# Patient Record
Sex: FEMALE | Race: WHITE
[De-identification: names, ages, dates, MRNs, and addresses within clinical notes are randomized per-mention and may not be internally consistent; named-entity substitution may affect disease eponyms.]

---

## 2023-08-14 ENCOUNTER — HOSPITAL ENCOUNTER (INPATIENT)
Dept: HOSPITAL 47 - EC | Age: 63
LOS: 8 days | Discharge: HOME | DRG: 682 | End: 2023-08-22
Attending: HOSPITALIST | Admitting: HOSPITALIST
Payer: MEDICARE

## 2023-08-14 VITALS — BODY MASS INDEX: 32.1 KG/M2

## 2023-08-14 DIAGNOSIS — Z91.119: ICD-10-CM

## 2023-08-14 DIAGNOSIS — Z88.1: ICD-10-CM

## 2023-08-14 DIAGNOSIS — E87.5: ICD-10-CM

## 2023-08-14 DIAGNOSIS — J84.9: ICD-10-CM

## 2023-08-14 DIAGNOSIS — Z28.21: ICD-10-CM

## 2023-08-14 DIAGNOSIS — Z88.0: ICD-10-CM

## 2023-08-14 DIAGNOSIS — G25.3: ICD-10-CM

## 2023-08-14 DIAGNOSIS — Z86.73: ICD-10-CM

## 2023-08-14 DIAGNOSIS — I34.0: ICD-10-CM

## 2023-08-14 DIAGNOSIS — I13.2: ICD-10-CM

## 2023-08-14 DIAGNOSIS — I50.9: ICD-10-CM

## 2023-08-14 DIAGNOSIS — Z99.2: ICD-10-CM

## 2023-08-14 DIAGNOSIS — I65.23: ICD-10-CM

## 2023-08-14 DIAGNOSIS — N17.9: Primary | ICD-10-CM

## 2023-08-14 DIAGNOSIS — I49.3: ICD-10-CM

## 2023-08-14 DIAGNOSIS — I95.9: ICD-10-CM

## 2023-08-14 DIAGNOSIS — J44.9: ICD-10-CM

## 2023-08-14 DIAGNOSIS — G89.29: ICD-10-CM

## 2023-08-14 DIAGNOSIS — Z79.899: ICD-10-CM

## 2023-08-14 DIAGNOSIS — K21.9: ICD-10-CM

## 2023-08-14 DIAGNOSIS — N18.6: ICD-10-CM

## 2023-08-14 DIAGNOSIS — E03.9: ICD-10-CM

## 2023-08-14 DIAGNOSIS — G92.8: ICD-10-CM

## 2023-08-14 DIAGNOSIS — R45.1: ICD-10-CM

## 2023-08-14 DIAGNOSIS — Z91.81: ICD-10-CM

## 2023-08-14 DIAGNOSIS — Z88.2: ICD-10-CM

## 2023-08-14 DIAGNOSIS — H02.403: ICD-10-CM

## 2023-08-14 DIAGNOSIS — E87.1: ICD-10-CM

## 2023-08-14 DIAGNOSIS — E83.9: ICD-10-CM

## 2023-08-14 DIAGNOSIS — Z79.82: ICD-10-CM

## 2023-08-14 DIAGNOSIS — D63.1: ICD-10-CM

## 2023-08-14 DIAGNOSIS — Z79.51: ICD-10-CM

## 2023-08-14 DIAGNOSIS — K29.70: ICD-10-CM

## 2023-08-14 LAB
ALBUMIN SERPL-MCNC: 4.1 G/DL (ref 3.5–5)
ALP SERPL-CCNC: 104 U/L (ref 38–126)
ALT SERPL-CCNC: 39 U/L (ref 4–34)
ANION GAP SERPL CALC-SCNC: 11 MMOL/L
AST SERPL-CCNC: 71 U/L (ref 14–36)
BASOPHILS # BLD AUTO: 0.1 K/UL (ref 0–0.2)
BASOPHILS NFR BLD AUTO: 1 %
BUN SERPL-SCNC: 54 MG/DL (ref 7–17)
CALCIUM SPEC-MCNC: 9.1 MG/DL (ref 8.4–10.2)
CHLORIDE SERPL-SCNC: 94 MMOL/L (ref 98–107)
CO2 BLDA-SCNC: 29 MMOL/L (ref 19–24)
CO2 SERPL-SCNC: 29 MMOL/L (ref 22–30)
EOSINOPHIL # BLD AUTO: 0 K/UL (ref 0–0.7)
EOSINOPHIL NFR BLD AUTO: 0 %
ERYTHROCYTE [DISTWIDTH] IN BLOOD BY AUTOMATED COUNT: 3.26 M/UL (ref 3.8–5.4)
ERYTHROCYTE [DISTWIDTH] IN BLOOD: 15.5 % (ref 11.5–15.5)
GLUCOSE SERPL-MCNC: 118 MG/DL (ref 74–99)
HCO3 BLDA-SCNC: 28 MMOL/L (ref 21–25)
HCT VFR BLD AUTO: 31.7 % (ref 34–46)
HGB BLD-MCNC: 10.4 GM/DL (ref 11.4–16)
LYMPHOCYTES # SPEC AUTO: 2.3 K/UL (ref 1–4.8)
LYMPHOCYTES NFR SPEC AUTO: 19 %
MAGNESIUM SPEC-SCNC: 4 MG/DL (ref 1.6–2.3)
MCH RBC QN AUTO: 31.9 PG (ref 25–35)
MCHC RBC AUTO-ENTMCNC: 32.7 G/DL (ref 31–37)
MCV RBC AUTO: 97.4 FL (ref 80–100)
MONOCYTES # BLD AUTO: 0.7 K/UL (ref 0–1)
MONOCYTES NFR BLD AUTO: 6 %
NEUTROPHILS # BLD AUTO: 8.5 K/UL (ref 1.3–7.7)
NEUTROPHILS NFR BLD AUTO: 73 %
PCO2 BLDA: 44 MMHG (ref 35–45)
PH BLDA: 7.4 [PH] (ref 7.35–7.45)
PLATELET # BLD AUTO: 173 K/UL (ref 150–450)
PO2 BLDA: 129 MMHG (ref 83–108)
POTASSIUM SERPL-SCNC: 7.4 MMOL/L (ref 3.5–5.1)
PROT SERPL-MCNC: 6.8 G/DL (ref 6.3–8.2)
SODIUM SERPL-SCNC: 134 MMOL/L (ref 137–145)
WBC # BLD AUTO: 11.7 K/UL (ref 3.8–10.6)

## 2023-08-14 PROCEDURE — 86706 HEP B SURFACE ANTIBODY: CPT

## 2023-08-14 PROCEDURE — 83550 IRON BINDING TEST: CPT

## 2023-08-14 PROCEDURE — 87324 CLOSTRIDIUM AG IA: CPT

## 2023-08-14 PROCEDURE — 84100 ASSAY OF PHOSPHORUS: CPT

## 2023-08-14 PROCEDURE — 82728 ASSAY OF FERRITIN: CPT

## 2023-08-14 PROCEDURE — 83540 ASSAY OF IRON: CPT

## 2023-08-14 PROCEDURE — 45378 DIAGNOSTIC COLONOSCOPY: CPT

## 2023-08-14 PROCEDURE — 94760 N-INVAS EAR/PLS OXIMETRY 1: CPT

## 2023-08-14 PROCEDURE — 96376 TX/PRO/DX INJ SAME DRUG ADON: CPT

## 2023-08-14 PROCEDURE — 5A1D70Z PERFORMANCE OF URINARY FILTRATION, INTERMITTENT, LESS THAN 6 HOURS PER DAY: ICD-10-PCS

## 2023-08-14 PROCEDURE — 90935 HEMODIALYSIS ONE EVALUATION: CPT

## 2023-08-14 PROCEDURE — 88305 TISSUE EXAM BY PATHOLOGIST: CPT

## 2023-08-14 PROCEDURE — 96365 THER/PROPH/DIAG IV INF INIT: CPT

## 2023-08-14 PROCEDURE — 82607 VITAMIN B-12: CPT

## 2023-08-14 PROCEDURE — 85027 COMPLETE CBC AUTOMATED: CPT

## 2023-08-14 PROCEDURE — 80048 BASIC METABOLIC PNL TOTAL CA: CPT

## 2023-08-14 PROCEDURE — 87340 HEPATITIS B SURFACE AG IA: CPT

## 2023-08-14 PROCEDURE — 70450 CT HEAD/BRAIN W/O DYE: CPT

## 2023-08-14 PROCEDURE — 71045 X-RAY EXAM CHEST 1 VIEW: CPT

## 2023-08-14 PROCEDURE — 99291 CRITICAL CARE FIRST HOUR: CPT

## 2023-08-14 PROCEDURE — 94640 AIRWAY INHALATION TREATMENT: CPT

## 2023-08-14 PROCEDURE — 83519 RIA NONANTIBODY: CPT

## 2023-08-14 PROCEDURE — 82746 ASSAY OF FOLIC ACID SERUM: CPT

## 2023-08-14 PROCEDURE — 96366 THER/PROPH/DIAG IV INF ADDON: CPT

## 2023-08-14 PROCEDURE — 85025 COMPLETE CBC W/AUTO DIFF WBC: CPT

## 2023-08-14 PROCEDURE — 70551 MRI BRAIN STEM W/O DYE: CPT

## 2023-08-14 PROCEDURE — 82805 BLOOD GASES W/O2 SATURATION: CPT

## 2023-08-14 PROCEDURE — 96375 TX/PRO/DX INJ NEW DRUG ADDON: CPT

## 2023-08-14 PROCEDURE — 80053 COMPREHEN METABOLIC PANEL: CPT

## 2023-08-14 PROCEDURE — 95816 EEG AWAKE AND DROWSY: CPT

## 2023-08-14 PROCEDURE — 83735 ASSAY OF MAGNESIUM: CPT

## 2023-08-14 PROCEDURE — 43239 EGD BIOPSY SINGLE/MULTIPLE: CPT

## 2023-08-14 PROCEDURE — 84443 ASSAY THYROID STIM HORMONE: CPT

## 2023-08-14 PROCEDURE — 74176 CT ABD & PELVIS W/O CONTRAST: CPT

## 2023-08-14 PROCEDURE — 93880 EXTRACRANIAL BILAT STUDY: CPT

## 2023-08-14 PROCEDURE — 93306 TTE W/DOPPLER COMPLETE: CPT

## 2023-08-14 PROCEDURE — 93005 ELECTROCARDIOGRAM TRACING: CPT

## 2023-08-14 PROCEDURE — 36600 WITHDRAWAL OF ARTERIAL BLOOD: CPT

## 2023-08-14 PROCEDURE — 84132 ASSAY OF SERUM POTASSIUM: CPT

## 2023-08-14 PROCEDURE — 82550 ASSAY OF CK (CPK): CPT

## 2023-08-14 PROCEDURE — 36415 COLL VENOUS BLD VENIPUNCTURE: CPT

## 2023-08-14 RX ADMIN — MIDODRINE HYDROCHLORIDE SCH MG: 5 TABLET ORAL at 21:49

## 2023-08-14 RX ADMIN — BUDESONIDE AND FORMOTEROL FUMARATE DIHYDRATE SCH PUFF: 160; 4.5 AEROSOL RESPIRATORY (INHALATION) at 20:40

## 2023-08-14 RX ADMIN — DICYCLOMINE HYDROCHLORIDE SCH MG: 10 CAPSULE ORAL at 21:49

## 2023-08-14 NOTE — P.NPCON
History of Present Illness





- Reason for Consult


end stage renal disease





- History of Present Illness





Patient is a 63 yr old female with ESRD, on HD on MWF schedule at [place]. 

Patient has been transferred from [facility] due to severe hyperkalemia. 

Patient was noted to have btadycardia and was treated for hyperkalemia with IV 

medications.





Potassium was 8. Patient was started on dopamine infusion.


Mentation has deteriorated since initial admission and enroute. S/p ativan for 

severe restlessness.


Patient was emergently started on HD in the ER. Tolerating treatment well. 

Patient is seen on HD in the ER.





No fever noted.


CXR shows pulmonary vascular congestion.





H/o volume overload and noncompliance with fluid restriction as out patient.





Review of Systems





as per HPI





Past Medical History


Past Medical History: Heart Failure, COPD, CVA/TIA, Dialysis, GERD/Reflux, 

Thyroid Disorder


History of Any Multi-Drug Resistant Organisms: Unobtainable


Additional Past Surgical History / Comment(s): Dialysis cath placement


Past Psychological History: No Psychological Hx Reported


Smoking Status: Unknown if ever smoked


Past Alcohol Use History: Unable to Obtain


Past Drug Use History: Unable to Obtain





Medications and Allergies


                                Home Medications











 Medication  Instructions  Recorded  Confirmed  Type


 


Albuterol Nebulized [Ventolin 2.5 mg INHALATION RT-Q6H PRN 08/14/23 08/14/23 

History





Nebulized]    


 


Albuterol Sulfate [Albuterol 1 puff INHALATION RT-Q4H PRN 08/14/23 08/14/23 

History





Sulfate Hfa]    


 


Atorvastatin Calcium 20 mg PO DAILY 08/14/23 08/14/23 History


 


Azithromycin [Zithromax Z Pack] See Taper PO AS DIRECTED 08/14/23 08/14/23 

History


 


Benzonatate [Tessalon Perles] 100 - 200 mg PO TID PRN 08/14/23 08/14/23 History


 


Budesonide/Formoterol Fumarate 2 puff INHALATION RT-BID 08/14/23 08/14/23 

History





[Symbicort 160-4.5 Mcg Inhaler]    


 


Bumetanide [BUMEX] 4 mg PO TID 08/14/23 08/14/23 History


 


Dicyclomine [Bentyl] 10 mg PO TID 08/14/23 08/14/23 History


 


Fluticasone Nasal Spray [Flonase 2 spray EA NOSTRIL DAILY 08/14/23 08/14/23 

History





Nasal Spray]    


 


Gabapentin [Neurontin] 200 mg PO BID 08/14/23 08/14/23 History


 


Isosorbide Mononitrate ER [Imdur] 30 mg PO DAILY 08/14/23 08/14/23 History


 


Lidocaine-Prilocaine Cream [Emla 1 applic TOPICAL AS DIRECTED 08/14/23 08/14/23 

History





Cream 2.5%/2.5%]    


 


Loperamide [Imodium] 4 mg PO QID PRN 08/14/23 08/14/23 History


 


Metoprolol Succinate (ER) [Toprol 50 mg PO DAILY 08/14/23 08/14/23 History





Xl]    


 


Midodrine HCl 10 mg PO TID 08/14/23 08/14/23 History


 


Montelukast [Singulair] 10 mg PO DAILY 08/14/23 08/14/23 History


 


Omeprazole [PriLOSEC] 20 mg PO AC-SUPPER 08/14/23 08/14/23 History


 


Ondansetron Odt [Zofran Odt] 4 mg PO Q8HR PRN 08/14/23 08/14/23 History


 


SILVER sulfADIAZINE Cream 1 applic TOPICAL BID 08/14/23 08/14/23 History





[Silvadene 1% Cream]    


 


Sevelamer Carbonate 1,600 - 2,400 mg PO AS DIRECTED 08/14/23 08/14/23 History


 


amLODIPine [Norvasc] 5 mg PO DAILY 08/14/23 08/14/23 History


 


amLODIPine [Norvasc] 10 mg PO DAILY 08/14/23 08/14/23 History


 


hydrALAZINE HCL 10 mg PO TID 08/14/23 08/14/23 History


 


lisinopriL [Zestril] 10 mg PO DAILY 08/14/23 08/14/23 History








                                    Allergies











Allergy/AdvReac Type Severity Reaction Status Date / Time


 


erythromycin base Allergy  Unknown Verified 08/14/23 11:17


 


Penicillins Allergy  Unknown Verified 08/14/23 11:17


 


Sulfa (Sulfonamide Allergy  Unknown Verified 08/14/23 11:17





Antibiotics)     


 


sulfacetamide Allergy  Unknown Verified 08/14/23 11:17














Physical Exam


Vitals: 


                                   Vital Signs











  Temp Pulse Pulse Resp BP BP Pulse Ox


 


 08/14/23 19:47   80   22  117/56   93 L


 


 08/14/23 19:29   61   18  117/56   98


 


 08/14/23 16:31   63   24  106/49   100


 


 08/14/23 16:25   66   24  119/65   97


 


 08/14/23 15:21   85   24  112/58  


 


 08/14/23 15:01   70   24  130/65   100


 


 08/14/23 14:31   77   24  132/66   100


 


 08/14/23 14:17   64   24  109/68   100


 


 08/14/23 13:56   73   24  123/77   97


 


 08/14/23 13:42   90   24  131/59   93 L


 


 08/14/23 13:25   80   24  122/71   95


 


 08/14/23 13:11  96.7 F L   77  20   114/58 


 


 08/14/23 12:37   63   24  101/53   97


 


 08/14/23 12:21   68   24  99/79   97


 


 08/14/23 12:19   69     


 


 08/14/23 12:11   87   24  96/74   99


 


 08/14/23 12:08   84     


 


 08/14/23 12:03   66   24  120/64   100


 


 08/14/23 11:57   75   24  109/52   99


 


 08/14/23 11:42   63   24  102/64   97


 


 08/14/23 11:30   65   24  92/54   98


 


 08/14/23 11:20   65   24  110/76  


 


 08/14/23 11:10   65   24  93/60   99


 


 08/14/23 11:05   64   18  114/54   94 L


 


 08/14/23 10:55   60   18  136/60   92 L


 


 08/14/23 10:20   80   20  93/64   94 L


 


 08/14/23 10:10     22  112/99   96


 


 08/14/23 10:00   58 L   20  86/61   96


 


 08/14/23 09:49   45 L   22    86 L


 


 08/14/23 09:44   48 L   20  96/76   86 L








                                Intake and Output











 08/14/23 08/14/23 08/14/23





 06:59 14:59 22:59


 


Intake Total  500 


 


Output Total  2600 


 


Balance  -2100 


 


Intake:   


 


  Hemodialysis  500 


 


Output:   


 


  Hemodialysis  2600 


 


Other:   


 


  Weight  81 kg 














Patient is obtunded. 


Responds to painful stimui


CVS S1 and S2


Lungs show decreased breath sounds at bases


Abdomen is soft nontender.


extremities show edema 2+ and chronic skin changes.


CNS exam shows patient only responding to painful stimuli.





Results





- Lab Results


                             Most recent lab results











ABG pH  7.40  (7.35-7.45)   08/14/23  10:46    


 


ABG pCO2  44 mmHg (35-45)   08/14/23  10:46    


 


ABG pO2  129 mmHg ()  H  08/14/23  10:46    


 


ABG HCO3  28 mmol/L (21-25)  H  08/14/23  10:46    


 


ABG O2 Saturation  98.4 % (94-97)  H  08/14/23  10:46    


 


Calcium  9.1 mg/dL (8.4-10.2)   08/14/23  09:50    


 


Magnesium  4.0 mg/dL (1.6-2.3)  H  08/14/23  09:50    














                                 08/14/23 09:50





                                 08/14/23 09:50





Assessment and Plan


Assessment: 





1. ESRD, on HD on MWF schedule.


2. Severe hyperkalemia associated with ESRD.


3. Bradycardia secondary to hyperkalemia.


4. Volume overload.


5. Mental status changes, r/o underlying infection/ sepsis


6. Hypotension associated with bradycardia, r/o sepsis


7. H/o CVA


8. CKD mineral bone disorder.


Plan: 





Urgent HD today and repeat in am


Repeat potassium 2 hrs after HD treatment.


Repeat labs in am.


Admit to ICU.


D/c hydralazine.


D/c dopamine after HD





Thank you for the consultation. We will continue to follow the patient with you.

## 2023-08-14 NOTE — P.CNPUL
History of Present Illness


Consult date: 08/14/23


Requesting physician: Isaiah Licea


Reason for consult: other (Critical care management)


Chief complaint: Altered mental status


History of present illness: 





This is a 63-year-old female patient with a known history of chronic obstructive

pulmonary disease, hypothyroidism, gastroesophageal reflux disease, CVA/TIA, 

congestive heart failure, end-stage renal disease on hemodialysis Monday Wednesday Friday.  She was on her way to dialysis but became altered and and a 

little distress and was brought to [facility] instead.  In the ER she was

found to have a pulse rate in the 20s low blood pressure and high potassium 

greater than 8.  She was placed on dopamine and given some atropine and 

eventually transcutaneously paced.  She was also treated with insulin, D50 on 

the bicarb and calcium.  She was transferred here to our ER for further 

evaluation and treatment.  White count 11.7.  Hemoglobin 10.4.  Platelets 173.  

Sodium 134.  Potassium 7.4.  Chloride 94.  Bicarb 29.  BUN 54.  Creatinine 7.11.

 Glucose 118.  Arterial blood gases on 100% nonrebreather mask revealed a PaO2 

of 129, pCO2 44 and a pH of 7.40.  She is seen today in consultation in the 

emergency department.  She is somewhat altered.  Restless. She is currently 

receiving urgent hemodialysis.  Remains on dopamine at 9 mcg/kg/m.  Heart rate 

in the 60s.  Blood pressure 101/53 with a mean of 69.  Saturations in the 90s.  

Chest x-ray reveals cardiomegaly but no acute cardiopulmonary process. 





Review of Systems


ROS unobtainable: due to mental status





Past Medical History


Past Medical History: Heart Failure, COPD, CVA/TIA, Dialysis, GERD/Reflux, 

Thyroid Disorder


History of Any Multi-Drug Resistant Organisms: Unobtainable


Additional Past Surgical History / Comment(s): Dialysis cath placement


Past Psychological History: No Psychological Hx Reported


Smoking Status: Unknown if ever smoked


Past Alcohol Use History: Unable to Obtain


Past Drug Use History: Unable to Obtain





Medications and Allergies


                                Home Medications











 Medication  Instructions  Recorded  Confirmed  Type


 


Albuterol Nebulized [Ventolin 2.5 mg INHALATION RT-Q6H PRN 08/14/23 08/14/23 

History





Nebulized]    


 


Albuterol Sulfate [Albuterol 1 puff INHALATION RT-Q4H PRN 08/14/23 08/14/23 

History





Sulfate Hfa]    


 


Atorvastatin Calcium 20 mg PO DAILY 08/14/23 08/14/23 History


 


Azithromycin [Zithromax Z Pack] See Taper PO AS DIRECTED 08/14/23 08/14/23 

History


 


Benzonatate [Tessalon Perles] 100 - 200 mg PO TID PRN 08/14/23 08/14/23 History


 


Budesonide/Formoterol Fumarate 2 puff INHALATION RT-BID 08/14/23 08/14/23 

History





[Symbicort 160-4.5 Mcg Inhaler]    


 


Bumetanide [BUMEX] 4 mg PO TID 08/14/23 08/14/23 History


 


Dicyclomine [Bentyl] 10 mg PO TID 08/14/23 08/14/23 History


 


Fluticasone Nasal Spray [Flonase 2 spray EA NOSTRIL DAILY 08/14/23 08/14/23 

History





Nasal Spray]    


 


Gabapentin [Neurontin] 200 mg PO BID 08/14/23 08/14/23 History


 


Isosorbide Mononitrate ER [Imdur] 30 mg PO DAILY 08/14/23 08/14/23 History


 


Lidocaine-Prilocaine Cream [Emla 1 applic TOPICAL AS DIRECTED 08/14/23 08/14/23 

History





Cream 2.5%/2.5%]    


 


Loperamide [Imodium] 4 mg PO QID PRN 08/14/23 08/14/23 History


 


Metoprolol Succinate (ER) [Toprol 50 mg PO DAILY 08/14/23 08/14/23 History





Xl]    


 


Midodrine HCl 10 mg PO TID 08/14/23 08/14/23 History


 


Montelukast [Singulair] 10 mg PO DAILY 08/14/23 08/14/23 History


 


Omeprazole [PriLOSEC] 20 mg PO AC-SUPPER 08/14/23 08/14/23 History


 


Ondansetron Odt [Zofran Odt] 4 mg PO Q8HR PRN 08/14/23 08/14/23 History


 


SILVER sulfADIAZINE Cream 1 applic TOPICAL BID 08/14/23 08/14/23 History





[Silvadene 1% Cream]    


 


Sevelamer Carbonate 1,600 - 2,400 mg PO AS DIRECTED 08/14/23 08/14/23 History


 


amLODIPine [Norvasc] 5 mg PO DAILY 08/14/23 08/14/23 History


 


amLODIPine [Norvasc] 10 mg PO DAILY 08/14/23 08/14/23 History


 


hydrALAZINE HCL 10 mg PO TID 08/14/23 08/14/23 History


 


lisinopriL [Zestril] 10 mg PO DAILY 08/14/23 08/14/23 History








                                    Allergies











Allergy/AdvReac Type Severity Reaction Status Date / Time


 


erythromycin base Allergy  Unknown Verified 08/14/23 11:17


 


Penicillins Allergy  Unknown Verified 08/14/23 11:17


 


Sulfa (Sulfonamide Allergy  Unknown Verified 08/14/23 11:17





Antibiotics)     


 


sulfacetamide Allergy  Unknown Verified 08/14/23 11:17














Physical Exam


Vitals: 


                                   Vital Signs











  Pulse Resp BP Pulse Ox


 


 08/14/23 12:37  63  24  101/53  97


 


 08/14/23 12:21  68  24  99/79  97


 


 08/14/23 12:19  69   


 


 08/14/23 12:11  87  24  96/74  99


 


 08/14/23 12:08  84   


 


 08/14/23 12:03  66  24  120/64  100


 


 08/14/23 11:57  75  24  109/52  99


 


 08/14/23 11:42  63  24  102/64  97


 


 08/14/23 11:30  65  24  92/54  98


 


 08/14/23 11:20  65  24  110/76 


 


 08/14/23 11:10  65  24  93/60  99


 


 08/14/23 11:05  64  18  114/54  94 L


 


 08/14/23 10:55  60  18  136/60  92 L


 


 08/14/23 10:20  80  20  93/64  94 L


 


 08/14/23 10:10   22  112/99  96


 


 08/14/23 10:00  58 L  20  86/61  96


 


 08/14/23 09:49  45 L  22   86 L


 


 08/14/23 09:44  48 L  20  96/76  86 L








                                Intake and Output











 08/13/23 08/14/23 08/14/23





 22:59 06:59 14:59


 


Other:   


 


  Weight   81 kg











GENERAL: Patient is well-developed and well-nourished 63-year-old female 

patient.  Patient is well-hydrated and is in mild distress.  Patient is agitated

 and only answering very basic commands


ENT: Neck is soft and supple.  No significant lymphadenopathy is noted.  

Oropharynx is clear.  Moist mucous membranes.  Neck has full range of motion 

without eliciting any pain.  


EYES: The sclera were anicteric and conjunctiva were pink and moist.  Extra

ocular movements were intact and pupils were equal round and reactive to light. 

 Eyelids were unremarkable.


PULMONARY: Unlabored respirations.  Good breath sounds bilaterally.  No audible 

rales rhonchi or wheezing was noted.


CARDIOVASCULAR: Currently off the transcutaneous pacer patient's heart rate was 

63 bpm.  


ABDOMEN: Soft and nontender with normal bowel sounds.  


SKIN: Skin is clear with no lesions or rashes and otherwise unremarkable.


NEUROLOGIC: Patient is alert and oriented 1.  Cranial nerves II through XII are

 grossly intact.  Motor and sensory are also intact.  Normal speech, volume and 

content.  Symmetrical smile. 


MUSCULOSKELETAL: Normal extremities with adequate strength and full range of 

motion.  2+ edema


LYMPHATICS:No significant lymphadenopathy is noted


PSYCHIATRIC: Unable to evaluate





Results





- Laboratory Findings


CBC and BMP: 


                                 08/14/23 09:50





                                 08/14/23 09:50


ABG











ABG pH  7.40  (7.35-7.45)   08/14/23  10:46    


 


ABG pCO2  44 mmHg (35-45)   08/14/23  10:46    


 


ABG pO2  129 mmHg ()  H  08/14/23  10:46    


 


ABG O2 Saturation  98.4 % (94-97)  H  08/14/23  10:46    








Abnormal lab findings: 


                                  Abnormal Labs











  08/14/23 08/14/23 08/14/23





  09:50 09:50 10:46


 


WBC  11.7 H  


 


RBC  3.26 L  


 


Hgb  10.4 L  


 


Hct  31.7 L  


 


Neutrophils #  8.5 H  


 


ABG pO2    129 H


 


ABG HCO3    28 H


 


ABG Total CO2    29 H


 


ABG O2 Saturation    98.4 H


 


Sodium   134 L 


 


Potassium   7.4 H* 


 


Chloride   94 L 


 


BUN   54 H 


 


Creatinine   7.11 H* 


 


Glucose   118 H 


 


Magnesium   4.0 H 


 


AST   71 H 


 


ALT   39 H 














- Diagnostic Findings


Chest x-ray: image reviewed





Assessment and Plan


Assessment: 





Altered mental status secondary to acute on chronic renal failure.  Creatinine 

7.11





Severe bradycardia secondary to hyperkalemia requiring transcutaneous pacing and

 dopamine infusion





Hypotension secondary to above





Hyperkalemia secondary to acute renal failure with potassium of 7.4





Mild transaminitis





History of CVA/TIA





History of hypertension





History of congestive heart failure





History of COPD





Former smoker





Plan:





The patient was seen and evaluated


Chest x-ray, labs and medications reviewed


To be admitted to the intensive care unit


Currently receiving urgent hemodialysis


Continue to monitor electrolytes closely


Titrate the FiO2 as tolerated


We will continue to follow and make further recommendations based on her 

clinical status





I have personally seen and examined the patient, performed a documentation and 

the assessment and plan as written.  Number minutes spent on the visit: 20.

## 2023-08-14 NOTE — ED
General Adult HPI





- General


Chief complaint: Recheck/Abnormal Lab/Rx


Stated complaint: bradycardia


Time Seen by Provider: 08/14/23 09:45


Source: patient, EMS, RN notes reviewed, old records reviewed


Mode of arrival: EMS


Limitations: no limitations





- History of Present Illness


Initial comments: 





This is a 63-year-old female presents emergency Department from [facility].  Patient is a dialysis patient and she was supposed to go to dialysis 

today but she was altered and in a little bit of distress.  According to the ER 

doctor at [facility] patient arrived with a pulse of 20 oh pressure was 

low potassium ended up being over 8.  Patient was put on dopamine and given some

atropine and then eventually transcutaneously pace.  Patient's heart rate when 

leaving [place] according to the ER doc was 70s and blood pressure was around 

100 systolic.  Patient was also given insulin and D50 as well as calcium.  I 

asked him to give bicarbonate for the patient before the patient left.  Patient 

is altered and not giving us any further history





- Related Data


                                Home Medications











 Medication  Instructions  Recorded  Confirmed


 


Albuterol Nebulized [Ventolin 2.5 mg INHALATION RT-Q6H PRN 08/14/23 08/14/23





Nebulized]   


 


Albuterol Sulfate [Albuterol 1 puff INHALATION RT-Q4H PRN 08/14/23 08/14/23





Sulfate Hfa]   


 


Atorvastatin Calcium 20 mg PO DAILY 08/14/23 08/14/23


 


Azithromycin [Zithromax Z Pack] See Taper PO AS DIRECTED 08/14/23 08/14/23


 


Benzonatate [Tessalon Perles] 100 - 200 mg PO TID PRN 08/14/23 08/14/23


 


Budesonide/Formoterol Fumarate 2 puff INHALATION RT-BID 08/14/23 08/14/23





[Symbicort 160-4.5 Mcg Inhaler]   


 


Bumetanide [BUMEX] 4 mg PO TID 08/14/23 08/14/23


 


Dicyclomine [Bentyl] 10 mg PO TID 08/14/23 08/14/23


 


Fluticasone Nasal Spray [Flonase 2 spray EA NOSTRIL DAILY 08/14/23 08/14/23





Nasal Spray]   


 


Gabapentin [Neurontin] 200 mg PO BID 08/14/23 08/14/23


 


Isosorbide Mononitrate ER [Imdur] 30 mg PO DAILY 08/14/23 08/14/23


 


Lidocaine-Prilocaine Cream [Emla 1 applic TOPICAL AS DIRECTED 08/14/23 08/14/23





Cream 2.5%/2.5%]   


 


Loperamide [Imodium] 4 mg PO QID PRN 08/14/23 08/14/23


 


Metoprolol Succinate (ER) [Toprol 50 mg PO DAILY 08/14/23 08/14/23





Xl]   


 


Midodrine HCl 10 mg PO TID 08/14/23 08/14/23


 


Montelukast [Singulair] 10 mg PO DAILY 08/14/23 08/14/23


 


Omeprazole [PriLOSEC] 20 mg PO AC-SUPPER 08/14/23 08/14/23


 


Ondansetron Odt [Zofran Odt] 4 mg PO Q8HR PRN 08/14/23 08/14/23


 


SILVER sulfADIAZINE Cream 1 applic TOPICAL BID 08/14/23 08/14/23





[Silvadene 1% Cream]   


 


Sevelamer Carbonate 1,600 - 2,400 mg PO AS DIRECTED 08/14/23 08/14/23


 


amLODIPine [Norvasc] 5 mg PO DAILY 08/14/23 08/14/23


 


amLODIPine [Norvasc] 10 mg PO DAILY 08/14/23 08/14/23


 


hydrALAZINE HCL 10 mg PO TID 08/14/23 08/14/23


 


lisinopriL [Zestril] 10 mg PO DAILY 08/14/23 08/14/23











                                    Allergies











Allergy/AdvReac Type Severity Reaction Status Date / Time


 


erythromycin base Allergy  Unknown Verified 08/14/23 11:17


 


Penicillins Allergy  Unknown Verified 08/14/23 11:17


 


Sulfa (Sulfonamide Allergy  Unknown Verified 08/14/23 11:17





Antibiotics)     


 


sulfacetamide Allergy  Unknown Verified 08/14/23 11:17














Review of Systems


ROS Statement: 


Those systems with pertinent positive or pertinent negative responses have been 

documented in the HPI.





ROS Other: All systems not noted in ROS Statement are negative.





Past Medical History


Past Medical History: Heart Failure, COPD, CVA/TIA, Dialysis, GERD/Reflux, 

Thyroid Disorder


History of Any Multi-Drug Resistant Organisms: Unobtainable


Additional Past Surgical History / Comment(s): Dialysis cath placement


Past Psychological History: No Psychological Hx Reported


Smoking Status: Unknown if ever smoked


Past Alcohol Use History: Unable to Obtain


Past Drug Use History: Unable to Obtain





General Exam





- General Exam Comments


Initial Comments: 





GENERAL:


Patient is well-developed and well-nourished.  Patient is nontoxic and well-

hydrated and is in mild distress.  Patient is agitated and only answering very 

basic commands





ENT:


Neck is soft and supple.  No significant lymphadenopathy is noted.  Oropharynx 

is clear.  Moist mucous membranes.  Neck has full range of motion without 

eliciting any pain.  





EYES:


The sclera were anicteric and conjunctiva were pink and moist.  Extraocular 

movements were intact and pupils were equal round and reactive to light.  

Eyelids were unremarkable.





PULMONARY:


Unlabored respirations.  Good breath sounds bilaterally.  No audible rales 

rhonchi or wheezing was noted.





CARDIOVASCULAR:


Once again off the transcutaneous pacer patient's heart rate was 44 bpm.  





ABDOMEN:


Soft and nontender with normal bowel sounds.  





SKIN:


Skin is clear with no lesions or rashes and otherwise unremarkable.





NEUROLOGIC:


Patient is alert and oriented 1.  Cranial nerves II through XII are grossly 

intact.  Motor and sensory are also intact.  Normal speech, volume and content. 

 Symmetrical smile. 





MUSCULOSKELETAL:


Normal extremities with adequate strength and full range of motion.  2+ edema





LYMPHATICS:


No significant lymphadenopathy is noted





PSYCHIATRIC:


Unable to evaluate


Limitations: no limitations





Course


                                   Vital Signs











  08/14/23 08/14/23 08/14/23





  09:44 09:49 10:00


 


Temperature   


 


Pulse Rate 48 L 45 L 58 L


 


Pulse Rate [   





Cardiac Monitor   





]   


 


Respiratory 20 22 20





Rate   


 


Blood Pressure 96/76  86/61


 


Blood Pressure   





[Right Arm]   


 


O2 Sat by Pulse 86 L 86 L 96





Oximetry   














  08/14/23 08/14/23 08/14/23





  10:10 10:20 10:55


 


Temperature   


 


Pulse Rate  80 60


 


Pulse Rate [   





Cardiac Monitor   





]   


 


Respiratory 22 20 18





Rate   


 


Blood Pressure 112/99 93/64 136/60


 


Blood Pressure   





[Right Arm]   


 


O2 Sat by Pulse 96 94 L 92 L





Oximetry   














  08/14/23 08/14/23 08/14/23





  11:05 11:10 11:20


 


Temperature   


 


Pulse Rate 64 65 65


 


Pulse Rate [   





Cardiac Monitor   





]   


 


Respiratory 18 24 24





Rate   


 


Blood Pressure 114/54 93/60 110/76


 


Blood Pressure   





[Right Arm]   


 


O2 Sat by Pulse 94 L 99 





Oximetry   














  08/14/23 08/14/23 08/14/23





  11:30 11:42 11:57


 


Temperature   


 


Pulse Rate 65 63 75


 


Pulse Rate [   





Cardiac Monitor   





]   


 


Respiratory 24 24 24





Rate   


 


Blood Pressure 92/54 102/64 109/52


 


Blood Pressure   





[Right Arm]   


 


O2 Sat by Pulse 98 97 99





Oximetry   














  08/14/23 08/14/23 08/14/23





  12:03 12:08 12:11


 


Temperature   


 


Pulse Rate 66 84 87


 


Pulse Rate [   





Cardiac Monitor   





]   


 


Respiratory 24  24





Rate   


 


Blood Pressure 120/64  96/74


 


Blood Pressure   





[Right Arm]   


 


O2 Sat by Pulse 100  99





Oximetry   














  08/14/23 08/14/23 08/14/23





  12:19 12:21 12:37


 


Temperature   


 


Pulse Rate 69 68 63


 


Pulse Rate [   





Cardiac Monitor   





]   


 


Respiratory  24 24





Rate   


 


Blood Pressure  99/79 101/53


 


Blood Pressure   





[Right Arm]   


 


O2 Sat by Pulse  97 97





Oximetry   














  08/14/23 08/14/23 08/14/23





  13:11 13:25 13:42


 


Temperature 97.9 F  


 


Pulse Rate  80 90


 


Pulse Rate [ 54 L  





Cardiac Monitor   





]   


 


Respiratory 17 24 24





Rate   


 


Blood Pressure  122/71 131/59


 


Blood Pressure 116/59  





[Right Arm]   


 


O2 Sat by Pulse 96 95 93 L





Oximetry   














  08/14/23 08/14/23 08/14/23





  13:56 14:17 14:31


 


Temperature   


 


Pulse Rate 73 64 77


 


Pulse Rate [   





Cardiac Monitor   





]   


 


Respiratory 24 24 24





Rate   


 


Blood Pressure 123/77 109/68 132/66


 


Blood Pressure   





[Right Arm]   


 


O2 Sat by Pulse 97 100 100





Oximetry   














  08/14/23 08/14/23 08/14/23





  15:01 15:21 16:25


 


Temperature   


 


Pulse Rate 70 85 66


 


Pulse Rate [   





Cardiac Monitor   





]   


 


Respiratory 24 24 24





Rate   


 


Blood Pressure 130/65 112/58 119/65


 


Blood Pressure   





[Right Arm]   


 


O2 Sat by Pulse 100  97





Oximetry   














  08/14/23 08/14/23 08/14/23





  16:31 19:29 19:47


 


Temperature   


 


Pulse Rate 63 61 80


 


Pulse Rate [   





Cardiac Monitor   





]   


 


Respiratory 24 18 22





Rate   


 


Blood Pressure 106/49 117/56 117/56


 


Blood Pressure   





[Right Arm]   


 


O2 Sat by Pulse 100 98 93 L





Oximetry   














  08/14/23 08/14/23 08/14/23





  21:54 23:00 23:30


 


Temperature   


 


Pulse Rate 66 60 65


 


Pulse Rate [   





Cardiac Monitor   





]   


 


Respiratory 18 18 18





Rate   


 


Blood Pressure 109/54 102/57 100/53


 


Blood Pressure   





[Right Arm]   


 


O2 Sat by Pulse 97 97 94 L





Oximetry   














  08/15/23 08/15/23 08/15/23





  00:30 01:37 01:40


 


Temperature   


 


Pulse Rate 60 72 61


 


Pulse Rate [   





Cardiac Monitor   





]   


 


Respiratory 18 18 18





Rate   


 


Blood Pressure 94/53 129/93 120/61


 


Blood Pressure   





[Right Arm]   


 


O2 Sat by Pulse 93 L 97 96





Oximetry   














  08/15/23 08/15/23 08/15/23





  03:02 04:11 08:03


 


Temperature   


 


Pulse Rate 60 60 86


 


Pulse Rate [   





Cardiac Monitor   





]   


 


Respiratory 18 18 18





Rate   


 


Blood Pressure 122/67 114/53 


 


Blood Pressure   





[Right Arm]   


 


O2 Sat by Pulse 97 92 L 





Oximetry   














  08/15/23 08/15/23 08/15/23





  08:05 08:15 08:50


 


Temperature   97.8 F


 


Pulse Rate  84 61


 


Pulse Rate [   





Cardiac Monitor   





]   


 


Respiratory  18 20





Rate   


 


Blood Pressure   113/65


 


Blood Pressure   





[Right Arm]   


 


O2 Sat by Pulse 93 L  99





Oximetry   














  08/15/23 08/15/23 08/15/23





  10:00 11:16 11:20


 


Temperature 97 F L 98.2 F 


 


Pulse Rate 85 48 L 


 


Pulse Rate [   53 L





Cardiac Monitor   





]   


 


Respiratory 20 14 





Rate   


 


Blood Pressure 165/85 116/59 


 


Blood Pressure   





[Right Arm]   


 


O2 Sat by Pulse 97 95 





Oximetry   














Medical Decision Making





- Medical Decision Making





Was pt. sent in by a medical professional or institution (, PA, NP, urgent 

care, hospital, or nursing home...) When possible be specific


@  -Patient was transferred to us from [facility]


Did you speak to anyone other than the patient for history (EMS, parent, family,

police, friend...)? What history was obtained from this source 


@  -Spoke with the ER doc to [facility] prior to transfer to get the 

history and the patient


Did you review nursing and triage notes (agree or disagree)?  Why? 


@  -I reviewed and agree with nursing and triage notes


Were old charts reviewed (outside hosp., previous admission, EMS record, old 

EKG, old radiological studies, urgent care reports/EKG's, nursing home records)?

Report findings 


@  -I reviewed prior lab work and charts and radiological studies from [facility]


Differential Diagnosis (chest pain, altered mental status, abdominal pain women,

abdominal pain men, vaginal bleeding, weakness, fever, dyspnea, syncope, 

headache, dizziness, GI bleed, back pain, seizure, CVA, palpatations, mental 

health, musculoskeletal)? 


@  -Differential Altered Mental Status:


Hypoglycemia, DKA, hypercapnia, ETOH, overdose, CO poisoning, trauma, myxedema 

coma, HTN encephalopathy, infection, encephalitis, psychosis, intercranial 

hemorrhage, hepatic encephalopathy, meningitis, CVA, this is not meant to be an 

all-inclusive list


EKG interpreted by me (3pts min.).


@  -As above


X-rays interpreted by me (1pt min.).


@  -She shows no acute abnormality


CT interpreted by me (1pt min.).


@  -None done


U/S interpreted by me (1pt. min.).


@  -None done


What testing was considered but not performed or refused? (CT, X-rays, U/S, 

labs)? Why?


@  -None


What meds were considered but not given or refused? Why?


@  -None


Did you discuss the management of the patient with other professionals 

(professionals i.e. , PA, NP, lab, RT, psych nurse, , , 

teacher, , )? Give summary


@  -Spoke with Dr. Bone and asked her to set up for dialysis on this patient 

upon arrival and they were ready to go one patient arrived.  I spoke with Dr. aranda he agreed to admit the patient.  Dr. Mcwilliams was contacted and he agreed to 

take the patient in the ICU


Was smoking cessation discussed for >3mins.?


@  -No


Was critical care preformed (if so, how long)?


@  -35 minutes


Were there social determinants of health that impacted care today? How? 

(Homelessness, low income, unemployed, alcoholism, drug addiction, 

transportation, low edu. Level, literacy, decrease access to med. care, [facility], 

rehab)?


@  -No


Was there de-escalation of care discussed even if they declined (Discuss DNR or 

withdrawal of care, Hospice)? DNR status


@  -No


What co-morbidities impacted this encounter? (DM, HTN, Smoking, COPD, CAD, 

Cancer, CVA, ARF, Chemo, Hep., AIDS, mental health diagnosis, sleep apnea, 

morbid obesity)?


@  -None


Was patient admitted / discharged? Hospital course, mention meds given and 

route, prescriptions, significant lab abnormalities, going to OR and other 

pertinent info.


@  -Patient needed dialysis was hyperkalemic.  Patient had dialysis immediately 

upon arrival.  I spoke with Dr. Bone she came down and saw the patient.  I 

spoke with Dr. aranda he agreed to admit the patient.  I spoke to Dr. Mcwilliams and 

he will accept the patient in the ICU.


Undiagnosed new problem with uncertain prognosis?


@  -No


Drug Therapy requiring intensive monitoring for toxicity (Heparin, Nitro, 

Insulin, Cardizem)?


@  -No


Were any procedures done?


@  -No


Diagnosis/symptom?


@  -Hyperkalemia


Acute, or Chronic, or Acute on Chronic?


@  -Acute


Uncomplicated (without systemic symptoms) or Complicated (systemic symptoms)?


@  -Complicated


Side effects of treatment?


@  -No


Exacerbation, Progression, or Severe Exacerbation?


@  -No


Poses a threat to life or bodily function? How? (Chest pain, USA, MI, pneumonia,

PE, COPD, DKA, ARF, appy, cholecystitis, CVA, Diverticulitis, Homicidal, 

Suicidal, threat to staff... and all critical care pts)


@  -Yes this could lead to an arrhythmia and death


Diagnosis/symptom?


@  -Altered mental status


Acute, or Chronic, or Acute on Chronic?


@  -Acute


Uncomplicated (without systemic symptoms) or Complicated (systemic symptoms)?


@  -Complicated


Side effects of treatment?


@  -none


Exacerbation, Progression, or Severe Exacerbation]


@  -no


Poses a threat to life or bodily function?


@  -no


Diagnosis/symptom?


@  -Bradycardia


Acute, or Chronic, or Acute on Chronic?


@  -Acute


Uncomplicated (without systemic symptoms) or Complicated (systemic symptoms)?


@  -Complicated


Side effects of treatment?


@  -none


Exacerbation, Progression, or Severe Exacerbation]


@  -No


Poses a threat to life or bodily function?


@  -Yes this could lead to hypoperfusion and ambulatory dysfunction





- Lab Data


Result diagrams: 


                                 08/14/23 09:50





                                 08/15/23 06:29


                                   Lab Results











  08/14/23 08/14/23 08/14/23 Range/Units





  09:50 09:50 09:50 


 


WBC  11.7 H    (3.8-10.6)  k/uL


 


RBC  3.26 L    (3.80-5.40)  m/uL


 


Hgb  10.4 L    (11.4-16.0)  gm/dL


 


Hct  31.7 L    (34.0-46.0)  %


 


MCV  97.4    (80.0-100.0)  fL


 


MCH  31.9    (25.0-35.0)  pg


 


MCHC  32.7    (31.0-37.0)  g/dL


 


RDW  15.5    (11.5-15.5)  %


 


Plt Count  173    (150-450)  k/uL


 


MPV  9.0    


 


Neutrophils %  73    %


 


Lymphocytes %  19    %


 


Monocytes %  6    %


 


Eosinophils %  0    %


 


Basophils %  1    %


 


Neutrophils #  8.5 H    (1.3-7.7)  k/uL


 


Lymphocytes #  2.3    (1.0-4.8)  k/uL


 


Monocytes #  0.7    (0-1.0)  k/uL


 


Eosinophils #  0.0    (0-0.7)  k/uL


 


Basophils #  0.1    (0-0.2)  k/uL


 


Hypochromasia  Slight    


 


Sample Site     


 


ABG pH     (7.35-7.45)  


 


ABG pCO2     (35-45)  mmHg


 


ABG pO2     ()  mmHg


 


ABG HCO3     (21-25)  mmol/L


 


ABG Total CO2     (19-24)  mmol/L


 


ABG O2 Saturation     (94-97)  %


 


ABG Base Excess     mmol/L


 


Konstantin Test     


 


FiO2     %


 


Sodium   134 L   (137-145)  mmol/L


 


Potassium   7.4 H*   (3.5-5.1)  mmol/L


 


Chloride   94 L   ()  mmol/L


 


Carbon Dioxide   29   (22-30)  mmol/L


 


Anion Gap   11   mmol/L


 


BUN   54 H   (7-17)  mg/dL


 


Creatinine   7.11 H*   (0.52-1.04)  mg/dL


 


Est GFR (CKD-EPI)AfAm   7   (>60 ml/min/1.73 sqM)  


 


Est GFR (CKD-EPI)NonAf   6   (>60 ml/min/1.73 sqM)  


 


Glucose   118 H   (74-99)  mg/dL


 


Calcium   9.1   (8.4-10.2)  mg/dL


 


Magnesium   4.0 H   (1.6-2.3)  mg/dL


 


Total Bilirubin   0.7   (0.2-1.3)  mg/dL


 


AST   71 H   (14-36)  U/L


 


ALT   39 H   (4-34)  U/L


 


Alkaline Phosphatase   104   ()  U/L


 


Total Protein   6.8   (6.3-8.2)  g/dL


 


Albumin   4.1   (3.5-5.0)  g/dL


 


Hep Bs Antigen    Non-Reactive  (Non-Reactive)  


 


Hep Bs Antibody    Positive  (Negative)  


 


Hep Bs Antibody, Quant    >1000.0  mIU/mL














  08/14/23 Range/Units





  10:46 


 


WBC   (3.8-10.6)  k/uL


 


RBC   (3.80-5.40)  m/uL


 


Hgb   (11.4-16.0)  gm/dL


 


Hct   (34.0-46.0)  %


 


MCV   (80.0-100.0)  fL


 


MCH   (25.0-35.0)  pg


 


MCHC   (31.0-37.0)  g/dL


 


RDW   (11.5-15.5)  %


 


Plt Count   (150-450)  k/uL


 


MPV   


 


Neutrophils %   %


 


Lymphocytes %   %


 


Monocytes %   %


 


Eosinophils %   %


 


Basophils %   %


 


Neutrophils #   (1.3-7.7)  k/uL


 


Lymphocytes #   (1.0-4.8)  k/uL


 


Monocytes #   (0-1.0)  k/uL


 


Eosinophils #   (0-0.7)  k/uL


 


Basophils #   (0-0.2)  k/uL


 


Hypochromasia   


 


Sample Site  LRAD  


 


ABG pH  7.40  (7.35-7.45)  


 


ABG pCO2  44  (35-45)  mmHg


 


ABG pO2  129 H  ()  mmHg


 


ABG HCO3  28 H  (21-25)  mmol/L


 


ABG Total CO2  29 H  (19-24)  mmol/L


 


ABG O2 Saturation  98.4 H  (94-97)  %


 


ABG Base Excess  2.9  mmol/L


 


Konstantin Test  Yes  


 


FiO2  100  %


 


Sodium   (137-145)  mmol/L


 


Potassium   (3.5-5.1)  mmol/L


 


Chloride   ()  mmol/L


 


Carbon Dioxide   (22-30)  mmol/L


 


Anion Gap   mmol/L


 


BUN   (7-17)  mg/dL


 


Creatinine   (0.52-1.04)  mg/dL


 


Est GFR (CKD-EPI)AfAm   (>60 ml/min/1.73 sqM)  


 


Est GFR (CKD-EPI)NonAf   (>60 ml/min/1.73 sqM)  


 


Glucose   (74-99)  mg/dL


 


Calcium   (8.4-10.2)  mg/dL


 


Magnesium   (1.6-2.3)  mg/dL


 


Total Bilirubin   (0.2-1.3)  mg/dL


 


AST   (14-36)  U/L


 


ALT   (4-34)  U/L


 


Alkaline Phosphatase   ()  U/L


 


Total Protein   (6.3-8.2)  g/dL


 


Albumin   (3.5-5.0)  g/dL


 


Hep Bs Antigen   (Non-Reactive)  


 


Hep Bs Antibody   (Negative)  


 


Hep Bs Antibody, Quant   mIU/mL














Critical Care Time


Critical Care Time: Yes


Total Critical Care Time: 35





Disposition


Clinical Impression: 


 Hyperkalemia, Renal failure, Altered mental status, Bradycardia





Disposition: ADMITTED AS IP TO THIS HOSP

## 2023-08-14 NOTE — XR
EXAMINATION TYPE: XR chest 1V portable

 

DATE OF EXAM: 8/14/2023

 

COMPARISON: NONE

 

HISTORY: Shortness of breath.

 

TECHNIQUE: Single frontal view of the chest is obtained.

 

FINDINGS:  

 

There is a right-sided dual-lumen central venous catheter the catheter tip in the right atrium.

 

The lungs are clear.

 

The cardiac silhouette is moderately enlarged and the pulmonary vessels are within normal limits.

 

IMPRESSION:  

 

Cardiomegaly with no acute cardiopulmonary findings otherwise seen.

## 2023-08-14 NOTE — P.HPIM
History of Present Illness





This is a pleasant 63 years old female with multiple medical problems


Sent from [facility] to the emergency room for altered mental status. 

Patient was seen well in the emergency room, she was with altered mental status,

she does not respond to verbal or tactile stimuli.  Does not follow command.  

There is no asymmetry noted.  However examination is limited by patient mental 

status..


Also got 1 dose of IV Ativan in the emergency room which made her more sleepy


Patient is admitted on the stage renal disease on hemodialysis, she preferred 

with dialysis.  Apparently a very monday, wednesday and friday  


Admission her creatinine was 7.1, while at [facility] was 5.2, patient 

also has hyperkalemia with potassium 7.4 on admission.  Patient is to get urgent

hemodialysis in the emergency room.


Blood pressure 112/58, heart rate 85, patient is mildly tachypneic.  Marked 

leukocytosis with a 11.7.


Chest x-ray showed pulmonary venous congestion with scattered interstitial 

edema.  Repeat chest x-ray this facility showed cardiomegaly with no acute 

process.


 CT of the pressure with no acute intracranial process.


Patient also on dopamine drip.


Patient is in critical condition is going to be monitored the ICU currently.











   





Review of Systems


ROS unobtainable: due to mental status





Past Medical History


Past Medical History: Heart Failure, COPD, CVA/TIA, Dialysis, GERD/Reflux, 

Thyroid Disorder


History of Any Multi-Drug Resistant Organisms: Unobtainable


Additional Past Surgical History / Comment(s): Dialysis cath placement


Past Psychological History: No Psychological Hx Reported


Smoking Status: Unknown if ever smoked


Past Alcohol Use History: Unable to Obtain


Past Drug Use History: Unable to Obtain





Medications and Allergies


                                Home Medications











 Medication  Instructions  Recorded  Confirmed  Type


 


Albuterol Nebulized [Ventolin 2.5 mg INHALATION RT-Q6H PRN 08/14/23 08/14/23 

History





Nebulized]    


 


Albuterol Sulfate [Albuterol 1 puff INHALATION RT-Q4H PRN 08/14/23 08/14/23 

History





Sulfate Hfa]    


 


Atorvastatin Calcium 20 mg PO DAILY 08/14/23 08/14/23 History


 


Azithromycin [Zithromax Z Pack] See Taper PO AS DIRECTED 08/14/23 08/14/23 

History


 


Benzonatate [Tessalon Perles] 100 - 200 mg PO TID PRN 08/14/23 08/14/23 History


 


Budesonide/Formoterol Fumarate 2 puff INHALATION RT-BID 08/14/23 08/14/23 

History





[Symbicort 160-4.5 Mcg Inhaler]    


 


Bumetanide [BUMEX] 4 mg PO TID 08/14/23 08/14/23 History


 


Dicyclomine [Bentyl] 10 mg PO TID 08/14/23 08/14/23 History


 


Fluticasone Nasal Spray [Flonase 2 spray EA NOSTRIL DAILY 08/14/23 08/14/23 

History





Nasal Spray]    


 


Gabapentin [Neurontin] 200 mg PO BID 08/14/23 08/14/23 History


 


Isosorbide Mononitrate ER [Imdur] 30 mg PO DAILY 08/14/23 08/14/23 History


 


Lidocaine-Prilocaine Cream [Emla 1 applic TOPICAL AS DIRECTED 08/14/23 08/14/23 

History





Cream 2.5%/2.5%]    


 


Loperamide [Imodium] 4 mg PO QID PRN 08/14/23 08/14/23 History


 


Metoprolol Succinate (ER) [Toprol 50 mg PO DAILY 08/14/23 08/14/23 History





Xl]    


 


Midodrine HCl 10 mg PO TID 08/14/23 08/14/23 History


 


Montelukast [Singulair] 10 mg PO DAILY 08/14/23 08/14/23 History


 


Omeprazole [PriLOSEC] 20 mg PO AC-SUPPER 08/14/23 08/14/23 History


 


Ondansetron Odt [Zofran Odt] 4 mg PO Q8HR PRN 08/14/23 08/14/23 History


 


SILVER sulfADIAZINE Cream 1 applic TOPICAL BID 08/14/23 08/14/23 History





[Silvadene 1% Cream]    


 


Sevelamer Carbonate 1,600 - 2,400 mg PO AS DIRECTED 08/14/23 08/14/23 History


 


amLODIPine [Norvasc] 5 mg PO DAILY 08/14/23 08/14/23 History


 


amLODIPine [Norvasc] 10 mg PO DAILY 08/14/23 08/14/23 History


 


hydrALAZINE HCL 10 mg PO TID 08/14/23 08/14/23 History


 


lisinopriL [Zestril] 10 mg PO DAILY 08/14/23 08/14/23 History








                                    Allergies











Allergy/AdvReac Type Severity Reaction Status Date / Time


 


erythromycin base Allergy  Unknown Verified 08/14/23 11:17


 


Penicillins Allergy  Unknown Verified 08/14/23 11:17


 


Sulfa (Sulfonamide Allergy  Unknown Verified 08/14/23 11:17





Antibiotics)     


 


sulfacetamide Allergy  Unknown Verified 08/14/23 11:17














Physical Exam


Vitals: 


                                   Vital Signs











  Temp Pulse Pulse Resp BP BP Pulse Ox


 


 08/14/23 14:17   64   24  109/68   100


 


 08/14/23 13:56   73   24  123/77   97


 


 08/14/23 13:42   90   24  131/59   93 L


 


 08/14/23 13:25   80   24  122/71   95


 


 08/14/23 13:11  96.7 F L   77  20   114/58 


 


 08/14/23 12:37   63   24  101/53   97


 


 08/14/23 12:21   68   24  99/79   97


 


 08/14/23 12:19   69     


 


 08/14/23 12:11   87   24  96/74   99


 


 08/14/23 12:08   84     


 


 08/14/23 12:03   66   24  120/64   100


 


 08/14/23 11:57   75   24  109/52   99


 


 08/14/23 11:42   63   24  102/64   97


 


 08/14/23 11:30   65   24  92/54   98


 


 08/14/23 11:20   65   24  110/76  


 


 08/14/23 11:10   65   24  93/60   99


 


 08/14/23 11:05   64   18  114/54   94 L


 


 08/14/23 10:55   60   18  136/60   92 L


 


 08/14/23 10:20   80   20  93/64   94 L


 


 08/14/23 10:10     22  112/99   96


 


 08/14/23 10:00   58 L   20  86/61   96


 


 08/14/23 09:49   45 L   22    86 L


 


 08/14/23 09:44   48 L   20  96/76   86 L








                                Intake and Output











 08/13/23 08/14/23 08/14/23





 22:59 06:59 14:59


 


Intake Total   500


 


Output Total   2600


 


Balance   -2100


 


Intake:   


 


  Hemodialysis   500


 


Output:   


 


  Hemodialysis   2600


 


Other:   


 


  Weight   81 kg














GENERAL: The patient is altered mental status, confused and nonverbal, mumbles, 

does not follow commands..  Obese


HEENT: Pupils are round and equally reacting to light. EOMI. No scleral icterus.

No conjunctival pallor. Normocephalic, atraumatic. No pharyngeal erythema. No 

thyromegaly. 


CARDIOVASCULAR: S1 and S2 present. No murmurs, rubs, or gallops. 


-PULMONARY: Chest is clear to auscultation, no wheezing , bilateral basilar 

crackles  


ABDOMEN: Soft, nontender, nondistended, normoactive bowel sounds. No palpable 

organomegaly. 


MUSCULOSKELETAL: No joint swelling or deformity. 


EXTREMITIES: No cyanosis, clubbing, or pedal edema. 


NEUROLOGICAL: Gross neurological examination did not reveal any focal deficits. 


SKIN: No rashes. no petechiae.





Results


CBC & Chem 7: 


                                 08/14/23 09:50





                                 08/14/23 09:50


Labs: 


                  Abnormal Lab Results - Last 24 Hours (Table)











  08/14/23 08/14/23 08/14/23 Range/Units





  09:50 09:50 10:46 


 


WBC  11.7 H    (3.8-10.6)  k/uL


 


RBC  3.26 L    (3.80-5.40)  m/uL


 


Hgb  10.4 L    (11.4-16.0)  gm/dL


 


Hct  31.7 L    (34.0-46.0)  %


 


Neutrophils #  8.5 H    (1.3-7.7)  k/uL


 


ABG pO2    129 H  ()  mmHg


 


ABG HCO3    28 H  (21-25)  mmol/L


 


ABG Total CO2    29 H  (19-24)  mmol/L


 


ABG O2 Saturation    98.4 H  (94-97)  %


 


Sodium   134 L   (137-145)  mmol/L


 


Potassium   7.4 H*   (3.5-5.1)  mmol/L


 


Chloride   94 L   ()  mmol/L


 


BUN   54 H   (7-17)  mg/dL


 


Creatinine   7.11 H*   (0.52-1.04)  mg/dL


 


Glucose   118 H   (74-99)  mg/dL


 


Magnesium   4.0 H   (1.6-2.3)  mg/dL


 


AST   71 H   (14-36)  U/L


 


ALT   39 H   (4-34)  U/L














Assessment and Plan


Assessment: 





Altered mental status most likely to metabolic/toxic encephalopathy


End-stage renal disease(Monday Wednesday and Friday, presents with worsening 

creatinine and hyperkalemia


Mild leukocytosis


COPD, 


History of CVA/TIA


History of hypothyroidism





Plan: 











Patient already with urgent hemodialysis in the emergency room 


as per nephrology team on the case.


Patient already been evaluated and will be monitored in critical care unit.


Telemetry monitoring.


Neuro check


Labs and medication were reviewed..  Continue same treatment.  Continue with 

symptomatic treatment.  Resume home medication.  Monitor labs and vitals.  DVT 

and GI prophylaxis.  Further recommendations as per clinical course of the 

patient


DVT prophylaxis: Mechanical


GI Prophylaxis: Pepcid


Prognosis is guarded

## 2023-08-15 LAB
ANION GAP SERPL CALC-SCNC: 8 MMOL/L
BUN SERPL-SCNC: 28 MG/DL (ref 7–17)
CALCIUM SPEC-MCNC: 8.1 MG/DL (ref 8.4–10.2)
CHLORIDE SERPL-SCNC: 100 MMOL/L (ref 98–107)
CO2 SERPL-SCNC: 27 MMOL/L (ref 22–30)
GLUCOSE SERPL-MCNC: 91 MG/DL (ref 74–99)
HBV SURFACE AB SERPL IA-ACNC: >1000 MIU/ML
POTASSIUM SERPL-SCNC: 5.6 MMOL/L (ref 3.5–5.1)
SODIUM SERPL-SCNC: 135 MMOL/L (ref 137–145)

## 2023-08-15 RX ADMIN — DICYCLOMINE HYDROCHLORIDE SCH MG: 10 CAPSULE ORAL at 08:59

## 2023-08-15 RX ADMIN — DICYCLOMINE HYDROCHLORIDE SCH MG: 10 CAPSULE ORAL at 09:07

## 2023-08-15 RX ADMIN — ATORVASTATIN CALCIUM SCH MG: 20 TABLET, FILM COATED ORAL at 09:07

## 2023-08-15 RX ADMIN — DICYCLOMINE HYDROCHLORIDE SCH MG: 10 CAPSULE ORAL at 16:57

## 2023-08-15 RX ADMIN — MIDODRINE HYDROCHLORIDE SCH MG: 5 TABLET ORAL at 09:08

## 2023-08-15 RX ADMIN — BUDESONIDE AND FORMOTEROL FUMARATE DIHYDRATE SCH PUFF: 160; 4.5 AEROSOL RESPIRATORY (INHALATION) at 19:59

## 2023-08-15 RX ADMIN — ONDANSETRON PRN MG: 4 TABLET, ORALLY DISINTEGRATING ORAL at 20:18

## 2023-08-15 RX ADMIN — ACETAMINOPHEN PRN MG: 325 TABLET, FILM COATED ORAL at 20:17

## 2023-08-15 RX ADMIN — FLUTICASONE PROPIONATE SCH SPRAY: 50 SPRAY, METERED NASAL at 12:41

## 2023-08-15 RX ADMIN — SEVELAMER CARBONATE SCH: 800 TABLET, FILM COATED ORAL at 09:00

## 2023-08-15 RX ADMIN — SEVELAMER CARBONATE SCH MG: 800 TABLET, FILM COATED ORAL at 12:41

## 2023-08-15 RX ADMIN — ISOSORBIDE MONONITRATE SCH MG: 30 TABLET, EXTENDED RELEASE ORAL at 10:35

## 2023-08-15 RX ADMIN — MONTELUKAST SODIUM SCH MG: 10 TABLET, FILM COATED ORAL at 09:07

## 2023-08-15 RX ADMIN — MIDODRINE HYDROCHLORIDE SCH: 5 TABLET ORAL at 08:56

## 2023-08-15 RX ADMIN — DICYCLOMINE HYDROCHLORIDE SCH MG: 10 CAPSULE ORAL at 20:17

## 2023-08-15 RX ADMIN — ACETAMINOPHEN PRN MG: 325 TABLET, FILM COATED ORAL at 12:54

## 2023-08-15 RX ADMIN — BUDESONIDE AND FORMOTEROL FUMARATE DIHYDRATE SCH PUFF: 160; 4.5 AEROSOL RESPIRATORY (INHALATION) at 08:03

## 2023-08-15 RX ADMIN — SEVELAMER CARBONATE SCH MG: 800 TABLET, FILM COATED ORAL at 16:57

## 2023-08-15 RX ADMIN — METOPROLOL SUCCINATE SCH MG: 50 TABLET, EXTENDED RELEASE ORAL at 09:07

## 2023-08-15 RX ADMIN — ONDANSETRON PRN MG: 4 TABLET, ORALLY DISINTEGRATING ORAL at 01:36

## 2023-08-15 RX ADMIN — ONDANSETRON PRN MG: 4 TABLET, ORALLY DISINTEGRATING ORAL at 12:41

## 2023-08-15 RX ADMIN — MIDODRINE HYDROCHLORIDE SCH MG: 5 TABLET ORAL at 20:17

## 2023-08-15 NOTE — P.PN
Subjective





This is a pleasant 63 years old female with multiple medical problems


Sent from [facility] to the emergency room for altered mental status. 

Patient was seen well in the emergency room, she was with altered mental status,

she does not respond to verbal or tactile stimuli.  Does not follow command.  

There is no asymmetry noted.  However examination is limited by patient mental 

status..


Also got 1 dose of IV Ativan in the emergency room which made her more sleepy


Patient is admitted on the stage renal disease on hemodialysis, she preferred 

with dialysis.  Apparently a very monday, wednesday and friday  


Admission her creatinine was 7.1, while at [facility] was 5.2, patient 

also has hyperkalemia with potassium 7.4 on admission.  Patient is to get urgent

hemodialysis in the emergency room.


Blood pressure 112/58, heart rate 85, patient is mildly tachypneic.  Marked 

leukocytosis with a 11.7.


Chest x-ray showed pulmonary venous congestion with scattered interstitial 

edema.  Repeat chest x-ray this facility showed cardiomegaly with no acute 

process.


 CT of the pressure with no acute intracranial process.


Patient also on dopamine drip.


Patient is in critical condition is going to be monitored the ICU currently.





08/15/2023 


patient is awake alert today, she is now she is an [facility] and she 

knows the daytime and date and the name of the president, she follows commands 

and has insight


She looks a little bit agitated and at bedtime today, she got bothered easily 

with frequent questioning.


She is complaining of from headache described by the patient as 10/10 all over, 

nonspecific in character associated with vomiting (patient has been vomiting for

the last 2-3 days as she describes) and dizziness.  She describes dizziness as a

room is spinning around her.


She denies any other specific symptoms to me.


Afebrile, vital signs stable


Laps improvement with potassium down to 5.6 and creatinine down to 4.0.  WBC 

11.7 and hemoglobin 11.4


Chest x-ray showing pulmonary venous congestion with vascular interstitial edema


Repeat chest x-ray is pending.  I reviewed myself with no significant change 

from yesterday





Objective





- Vital Signs


Vital signs: 


                                   Vital Signs











Temp  98.2 F   08/15/23 11:16


 


Pulse  53 L  08/15/23 11:20


 


Resp  14   08/15/23 11:16


 


BP  116/59   08/15/23 11:16


 


Pulse Ox  95   08/15/23 11:16


 


FiO2      








                                 Intake & Output











 08/14/23 08/15/23 08/15/23





 18:59 06:59 18:59


 


Intake Total 500  


 


Output Total 2600  


 


Balance -2100  


 


Weight 81 kg  


 


Intake:   


 


  Hemodialysis 500  


 


Output:   


 


  Hemodialysis 2600  














- Exam





-GENERAL: The patient is alert and oriented x3, anxious not in any acute 

distress. Well developed, well nourished. 


HEENT: Pupils are round and equally reacting to light. EOMI. No scleral icterus.

No conjunctival pallor. Normocephalic, atraumatic. No pharyngeal erythema. No 

thyromegaly. 


CARDIOVASCULAR: S1 and S2 present. No murmurs, rubs, or gallops. 


PULMONARY: Chest is clear to auscultation, no wheezing , no crackles. 


ABDOMEN: Soft, nontender, nondistended, normoactive bowel sounds. No palpable 

organomegaly. 


MUSCULOSKELETAL: No joint swelling or deformity. 


EXTREMITIES: No cyanosis, clubbing, or pedal edema. 


NEUROLOGICAL: Gross neurological examination did not reveal any focal deficits. 


SKIN: No rashes. no petechiae.





- Labs


CBC & Chem 7: 


                                 08/14/23 09:50





                                 08/15/23 06:29


Labs: 


                  Abnormal Lab Results - Last 24 Hours (Table)











  08/15/23 Range/Units





  06:29 


 


Sodium  135 L  (137-145)  mmol/L


 


Potassium  5.6 H  (3.5-5.1)  mmol/L


 


BUN  28 H  (7-17)  mg/dL


 


Creatinine  4.08 H  (0.52-1.04)  mg/dL


 


Calcium  8.1 L  (8.4-10.2)  mg/dL














Assessment and Plan


Assessment: 





Altered mental status most likely to metabolic/toxic encephalopathy, improving 


headache with vertigo


End-stage renal disease(Monday Wednesday and Friday, presents with worsening 

creatinine and hyperkalemia


Mild leukocytosis


COPD, 


History of CVA/TIA


History of hypothyroidism





Plan: 








Order CT of the head.


Neurology consult


Patient is status post urgent hemodialysis in the emergency room as per 

nephrology team on the case.  Creatinine and hyperkalemia improvement


Patient was downgraded to select units


Pulmonary/critical care team consult already on the case


Telemetry monitoring.


Neuro check


Labs and medication were reviewed..  Continue same treatment.  Continue with 

symptomatic treatment.  Resume home medication.  Monitor labs and vitals.  DVT 

and GI prophylaxis.  Further recommendations as per clinical course of the 

patient


DVT prophylaxis: Mechanical


GI Prophylaxis: Pepcid


Prognosis is guarded

## 2023-08-15 NOTE — XR
EXAMINATION TYPE: XR chest 1V

 

DATE OF EXAM: 8/15/2023

 

COMPARISON: 8/14/2023

 

HISTORY: Shortness of breath

 

TECHNIQUE: Single frontal view of the chest is obtained.

 

FINDINGS:  There is no focal air space opacity, pleural effusion, or pneumothorax seen.  The heart is
 enlarged and there is a dialysis catheter. Mild interstitial prominence. Surgical clips are seen adj
acent to the left humerus. Diffuse osteopenia..

 

IMPRESSION:  

1. Cardiomegaly correlate for chronic interstitial lung disease or mild venous congestion.

## 2023-08-15 NOTE — P.PN
Subjective


Progress Note Date: 08/15/23





This is a 63-year-old female patient with a known history of chronic obstructive

pulmonary disease, hypothyroidism, gastroesophageal reflux disease, CVA/TIA, 

congestive heart failure, end-stage renal disease on hemodialysis Monday Wednesday Friday.  She was on her way to dialysis but became altered and and a 

little distress and was brought to [facility] instead.  In the ER she was

found to have a pulse rate in the 20s low blood pressure and high potassium 

greater than 8.  She was placed on dopamine and given some atropine and 

eventually transcutaneously paced.  She was also treated with insulin, D50 on 

the bicarb and calcium.  She was transferred here to our ER for further 

evaluation and treatment.  White count 11.7.  Hemoglobin 10.4.  Platelets 173.  

Sodium 134.  Potassium 7.4.  Chloride 94.  Bicarb 29.  BUN 54.  Creatinine 7.11.

 Glucose 118.  Arterial blood gases on 100% nonrebreather mask revealed a PaO2 

of 129, pCO2 44 and a pH of 7.40.  She is seen today in consultation in the e

mergency department.  She is somewhat altered.  Restless. She is currently 

receiving urgent hemodialysis.  Remains on dopamine at 9 mcg/kg/m.  Heart rate 

in the 60s.  Blood pressure 101/53 with a mean of 69.  Saturations in the 90s.  

Chest x-ray reveals cardiomegaly but no acute cardiopulmonary process. 





The patient is seen today 08/15/2023 in follow-up in the emergency department.  

She is arousable.  She did receive hemodialysis yesterday with 2.6 L removed.  

Potassium had improved to 4.7.  Currently 5.6.  Sodium 135.  Bicarb 27.  BUN 28.

 Creatinine 4.08.  Chest x-ray shows cardiomegaly with chronic interstitial lung

disease.  Versus mild venous congestion.  She is continued on Symbicort, 

albuterol, Singulair.  She remains afebrile.  Hemodynamically stable.  On oxygen

at 2 L/m per nasal cannula.





Objective





- Vital Signs


Vital signs: 


                                   Vital Signs











Temp  98.2 F   08/15/23 11:16


 


Pulse  53 L  08/15/23 11:20


 


Resp  14   08/15/23 11:16


 


BP  116/59   08/15/23 11:16


 


Pulse Ox  95   08/15/23 11:16


 


FiO2      








                                 Intake & Output











 08/14/23 08/15/23 08/15/23





 18:59 06:59 18:59


 


Intake Total 500  


 


Output Total 2600  


 


Balance -2100  


 


Weight 81 kg  


 


Intake:   


 


  Hemodialysis 500  


 


Output:   


 


  Hemodialysis 2600  














- Exam





GENERAL: This is a 63-year-old female patient.  Arousable.  On 3 L nasal 

cannula.


ENT: Neck is soft and supple.  No significant lymphadenopathy is noted.  

Oropharynx is clear.  Moist mucous membranes.  Neck has full range of motion 

without eliciting any pain.  


EYES: The sclera were anicteric and conjunctiva were pink and moist.  

Extraocular movements were intact and pupils were equal round and reactive to 

light.  Eyelids were unremarkable.


PULMONARY: Unlabored respirations.  Good breath sounds bilaterally.  Crackles in

the bilateral bases.


CARDIOVASCULAR: Regular S1 and S2, no gallops or rubs.


ABDOMEN: Soft and nontender with normal bowel sounds.  


SKIN: Skin is clear with no lesions or rashes and otherwise unremarkable.


NEUROLOGIC: Patient is alert and oriented 1.  Cranial nerves II through XII are

grossly intact.  Motor and sensory are also intact. 


MUSCULOSKELETAL: Normal extremities with adequate strength and full range of 

motion.  2+ edema


LYMPHATICS:No significant lymphadenopathy is noted


PSYCHIATRIC: Unable to evaluate





- Labs


CBC & Chem 7: 


                                 08/14/23 09:50





                                 08/15/23 06:29


Labs: 


                  Abnormal Lab Results - Last 24 Hours (Table)











  08/15/23 Range/Units





  06:29 


 


Sodium  135 L  (137-145)  mmol/L


 


Potassium  5.6 H  (3.5-5.1)  mmol/L


 


BUN  28 H  (7-17)  mg/dL


 


Creatinine  4.08 H  (0.52-1.04)  mg/dL


 


Calcium  8.1 L  (8.4-10.2)  mg/dL














Assessment and Plan


Assessment: 





Altered mental status secondary to acute on chronic renal failure.  Creatinine 

7.11 status post hemodialysis on 08/14/2023 with 2.6 L removed.  Current 

creatinine 4.08 





Severe bradycardia secondary to hyperkalemia requiring transcutaneous pacing and

dopamine infusion improved





Hypotension secondary to above





Hyperkalemia secondary to acute renal failure with potassium of 7.4. 

Postdialysis potassium improved to 4.7.  Currently 5.6.





Mild transaminitis





History of CVA/TIA





History of hypertension





History of congestive heart failure





History of COPD





Former smoker





Plan:





The patient was seen and evaluated


Chest x-ray, labs and medications reviewed


Received urgent hemodialysis yesterday


Continue to monitor electrolytes closely


Titrate the FiO2 as tolerated


We will continue to follow 





I have personally seen and examined the patient, performed a documentation and 

the assessment and plan as written.  Number minutes spent on the visit: 10.

## 2023-08-15 NOTE — CT
EXAMINATION TYPE: CT brain wo con

CT DLP: 1203.4 mGycm, Automated exposure control for dose reduction was used.

 

DATE OF EXAM: 8/15/2023 4:43 PM

 

COMPARISON: None

 

CLINICAL INDICATION:Female, 63 years old with history of has HA   Vertigo, ams

 

TECHNIQUE: 

Brain: Axial CT images of the brain were obtained with coronal and sagittal reformats created and rev
iewed.

Contrast used: None.

Oral contrast used: None.

 

FINDINGS:

 

Brain:

Extra-axial spaces: No abnormal extra-axial fluid collections.

Ventricular system: Within normal limits

Cerebral parenchyma: No acute intraparenchymal hemorrhage or mass effect.  The gray-white junction is
 well differentiated.     

Cerebellum: Unremarkable.

Mass effect: No evidence of midline shift.

Intracranial vasculature: unremarkable

Soft tissues: Left frontal contusion measuring 2.9 x 3.4 cm.

Calvarium/osseous structures: No depressed skull fracture.

Paranasal sinuses and mastoid air cells: Mild scattered paranasal sinus disease.

Visualized orbits: Orbital contents are intact.

 

 

IMPRESSION:

1.  No acute intracranial process.

2.  Left frontal soft tissue contusion. No evidence of fracture.

## 2023-08-15 NOTE — P.PN
Subjective





Patient is seen for follow-up for end-stage renal disease.


She is still in the ER.


Patient tolerated hemodialysis well yesterday.  UF of 2.6 L.


Potassium is at 5.6 today.


Patient is complaining of dizziness.  She also has a headache.


Mentation is back to normal.





Patient is scheduled for hemodialysis again today.








Objective





- Vital Signs


Vital signs: 


                                   Vital Signs











Temp  97.8 F   08/15/23 12:22


 


Pulse  58 L  08/15/23 12:22


 


Resp  20   08/15/23 12:22


 


BP  138/67   08/15/23 12:22


 


Pulse Ox  98   08/15/23 12:22


 


FiO2      








                                 Intake & Output











 08/14/23 08/15/23 08/15/23





 18:59 06:59 18:59


 


Intake Total 500  


 


Output Total 2600  


 


Balance -2100  


 


Weight 81 kg  


 


Intake:   


 


  Hemodialysis 500  


 


Output:   


 


  Hemodialysis 2600  














- Exam





Patient is awake, comfortable, no acute distress


Hematoma on the top of the left side of the head


Some bruising on the right side of the face noted which is improving from 2 

weeks ago.


Examination of the heart S1 and S2


Examination of the lungs bilateral breath sounds are heard


Abdomen is soft nontender


Examination lower extremity shows edema 2+ bilaterally with chronic skin changes


CNS exam grossly intact





- Labs


CBC & Chem 7: 


                                 08/14/23 09:50





                                 08/15/23 06:29


Labs: 


                  Abnormal Lab Results - Last 24 Hours (Table)











  08/15/23 Range/Units





  06:29 


 


Sodium  135 L  (137-145)  mmol/L


 


Potassium  5.6 H  (3.5-5.1)  mmol/L


 


BUN  28 H  (7-17)  mg/dL


 


Creatinine  4.08 H  (0.52-1.04)  mg/dL


 


Calcium  8.1 L  (8.4-10.2)  mg/dL














Assessment and Plan


Assessment: 





1. ESRD, on HD on MWF schedule.


2. Severe hyperkalemia associated with ESRD.


3. Bradycardia secondary to hyperkalemia.


4. Volume overload.


5. Mental status changes, r/o underlying infection/ sepsis.  Mentation currently

back to normal


6. Hypotension associated with bradycardia, r/o sepsis


7. H/o CVA


8. CKD mineral bone disorder.


9.  History of fall about 2 weeks ago with hematoma on the top of the head


Plan: 





Repeat hemodialysis today


No anticoagulation with hemodialysis


Repeat CT of the brain given complaints of headache and dizziness


Repeat labs in a.m.


UF about 2 L today.


Repeat hemodialysis in a.m.


Continue with phosphate binders

## 2023-08-16 LAB — CK SERPL-CCNC: 113 U/L (ref 30–135)

## 2023-08-16 RX ADMIN — MIDODRINE HYDROCHLORIDE SCH: 5 TABLET ORAL at 16:34

## 2023-08-16 RX ADMIN — SEVELAMER CARBONATE SCH MG: 800 TABLET, FILM COATED ORAL at 09:25

## 2023-08-16 RX ADMIN — ONDANSETRON PRN MG: 4 TABLET, ORALLY DISINTEGRATING ORAL at 20:16

## 2023-08-16 RX ADMIN — ISOSORBIDE MONONITRATE SCH: 30 TABLET, EXTENDED RELEASE ORAL at 16:31

## 2023-08-16 RX ADMIN — DICYCLOMINE HYDROCHLORIDE SCH MG: 10 CAPSULE ORAL at 16:35

## 2023-08-16 RX ADMIN — SEVELAMER CARBONATE SCH MG: 800 TABLET, FILM COATED ORAL at 16:35

## 2023-08-16 RX ADMIN — BUDESONIDE AND FORMOTEROL FUMARATE DIHYDRATE SCH PUFF: 160; 4.5 AEROSOL RESPIRATORY (INHALATION) at 09:16

## 2023-08-16 RX ADMIN — DICYCLOMINE HYDROCHLORIDE SCH MG: 10 CAPSULE ORAL at 20:16

## 2023-08-16 RX ADMIN — MONTELUKAST SODIUM SCH MG: 10 TABLET, FILM COATED ORAL at 09:20

## 2023-08-16 RX ADMIN — SEVELAMER CARBONATE SCH MG: 800 TABLET, FILM COATED ORAL at 20:16

## 2023-08-16 RX ADMIN — MIDODRINE HYDROCHLORIDE SCH MG: 5 TABLET ORAL at 20:17

## 2023-08-16 RX ADMIN — FLUTICASONE PROPIONATE SCH SPRAY: 50 SPRAY, METERED NASAL at 09:21

## 2023-08-16 RX ADMIN — MIDODRINE HYDROCHLORIDE SCH MG: 5 TABLET ORAL at 09:19

## 2023-08-16 RX ADMIN — SEVELAMER CARBONATE SCH MG: 800 TABLET, FILM COATED ORAL at 09:23

## 2023-08-16 RX ADMIN — ATORVASTATIN CALCIUM SCH MG: 20 TABLET, FILM COATED ORAL at 09:20

## 2023-08-16 RX ADMIN — DICYCLOMINE HYDROCHLORIDE SCH MG: 10 CAPSULE ORAL at 09:20

## 2023-08-16 RX ADMIN — ACETAMINOPHEN PRN MG: 325 TABLET, FILM COATED ORAL at 16:35

## 2023-08-16 RX ADMIN — BUDESONIDE AND FORMOTEROL FUMARATE DIHYDRATE SCH PUFF: 160; 4.5 AEROSOL RESPIRATORY (INHALATION) at 21:37

## 2023-08-16 RX ADMIN — METOPROLOL SUCCINATE SCH: 50 TABLET, EXTENDED RELEASE ORAL at 09:20

## 2023-08-16 NOTE — P.PN
Subjective





Patient is seen for follow-up for end-stage renal disease.





Patient is seen on hemodialysis.  Tolerating treatment well.


CT of the head was negative for any acute process.  Soft tissue contusion on the

left side noted.





Objective





- Vital Signs


Vital signs: 


                                   Vital Signs











Temp  98.4 F   08/16/23 04:00


 


Pulse  52 L  08/16/23 12:00


 


Resp  16   08/16/23 12:00


 


BP  133/67   08/16/23 12:00


 


Pulse Ox  93 L  08/16/23 04:00


 


FiO2      








                                 Intake & Output











 08/15/23 08/16/23 08/16/23





 18:59 06:59 18:59


 


Intake Total 300 120 


 


Output Total 800  


 


Balance -500 120 


 


Weight  83 kg 


 


Intake:   


 


  Oral 0 120 


 


  Hemodialysis 300  


 


Output:   


 


  Hemodialysis 800  


 


Other:   


 


  Voiding Method Bedside Commode Bedside Commode Bedside Commode


 


  # Voids  1 1














- Exam





Patient is awake, comfortable, no acute distress


Soft tissues swelling on the top of the left side of the head


Some bruising on the right side of the face noted which is improving from 2 

weeks ago.


Examination of the heart S1 and S2


Examination of the lungs bilateral breath sounds are heard


Abdomen is soft nontender


Examination lower extremity shows edema 2+ bilaterally with chronic skin changes


CNS exam grossly intact





- Labs


CBC & Chem 7: 


                                 08/14/23 09:50





                                 08/15/23 06:29





Assessment and Plan


Assessment: 





1. ESRD, on HD on MWF schedule.


2. Severe hyperkalemia associated with ESRD.


3. Bradycardia secondary to hyperkalemia.  Potassium has decreased to 5.6 

predialysis yesterday but patient remains bradycardic with heart rate at 32 b/m 

on dialysis.  Metoprolol dose has been decreased.


4. Volume overload.


5. Mental status changes, r/o underlying infection/ sepsis.  Mentation currently

back to normal


6. Hypotension associated with bradycardia, r/o sepsis


7. H/o CVA


8. CKD mineral bone disorder.


9.  History of fall about 2 weeks ago with hematoma on the top of the head


Plan: 





Repeat hemodialysis today


No anticoagulation with hemodialysis


Agree with decreasing dose of metoprolol.  I will discontinue it as heart rate 

was in the 30s during dialysis.


Repeat labs in a.m.


UF about 2 L today.


Repeat hemodialysis in a.m.


Continue with phosphate binders

## 2023-08-16 NOTE — P.PN
Subjective


Progress Note Date: 08/16/23


Principal diagnosis: 





Renal failure, hyperkalemia.





This is a 63-year-old female patient with a known history of chronic obstructive

pulmonary disease, hypothyroidism, gastroesophageal reflux disease, CVA/TIA, 

congestive heart failure, end-stage renal disease on hemodialysis Monday Wednesday Friday.  She was on her way to dialysis but became altered and and a 

little distress and was brought to [facility] instead.  In the ER she was

found to have a pulse rate in the 20s low blood pressure and high potassium 

greater than 8.  She was placed on dopamine and given some atropine and 

eventually transcutaneously paced.  She was also treated with insulin, D50 on 

the bicarb and calcium.  She was transferred here to our ER for further 

evaluation and treatment.  White count 11.7.  Hemoglobin 10.4.  Platelets 173.  

Sodium 134.  Potassium 7.4.  Chloride 94.  Bicarb 29.  BUN 54.  Creatinine 7.11.

 Glucose 118.  Arterial blood gases on 100% nonrebreather mask revealed a PaO2 

of 129, pCO2 44 and a pH of 7.40.  She is seen today in consultation in the 

emergency department.  She is somewhat altered.  Restless. She is currently 

receiving urgent hemodialysis.  Remains on dopamine at 9 mcg/kg/m.  Heart rate 

in the 60s.  Blood pressure 101/53 with a mean of 69.  Saturations in the 90s.  

Chest x-ray reveals cardiomegaly but no acute cardiopulmonary process. 





The patient is seen today 08/15/2023 in follow-up in the emergency department.  

She is arousable.  She did receive hemodialysis yesterday with 2.6 L removed.  

Potassium had improved to 4.7.  Currently 5.6.  Sodium 135.  Bicarb 27.  BUN 28.

 Creatinine 4.08.  Chest x-ray shows cardiomegaly with chronic interstitial lung

disease.  Versus mild venous congestion.  She is continued on Symbicort, 

albuterol, Singulair.  She remains afebrile.  Hemodynamically stable.  On oxygen

at 2 L/m per nasal cannula.





Progress note dated 08/16/2023.





The patient is seen today in room 370.  She is currently undergoing hemodialysi

s.  She's not receiving any IV fluids.  She's currently on oxygen at 3 L by 

nasal cannula.  She appears to be much more awake and alert today than she was 

yesterday.  She was initially admitted from an outside hospital, for renal 

failure, and severe hyperkalemia.  Her mental status is been poor, but is much 

better today.  No new labs today as yet.  Labs from yesterday show sodium 135, 

potassium 5.6, chlorides 100, CO2 27, BUN 28, creatinine 4.08.  Brain CT showed 

no acute intracranial process.  She does have a left frontal soft tissue 

contusion.





Objective





- Vital Signs


Vital signs: 


                                   Vital Signs











Temp  98.4 F   08/16/23 04:00


 


Pulse  46 L  08/16/23 08:00


 


Resp  16   08/16/23 08:00


 


BP  152/76   08/16/23 08:00


 


Pulse Ox  93 L  08/16/23 04:00


 


FiO2      








                                 Intake & Output











 08/15/23 08/16/23 08/16/23





 18:59 06:59 18:59


 


Intake Total 300 120 


 


Output Total 800  


 


Balance -500 120 


 


Weight  83 kg 


 


Intake:   


 


  Oral 0 120 


 


  Hemodialysis 300  


 


Output:   


 


  Hemodialysis 800  


 


Other:   


 


  Voiding Method Bedside Commode Bedside Commode Bedside Commode


 


  # Voids  1 1














- Exam





No acute distress, lethargic, but less so than the day before, and much more 

with it.  Currently, she is on 3 L of oxygen.





HEENT examination is grossly unremarkable. 





Neck supple.  Full range of motion.  No adenopathy thyromegaly or neck vein 

distention.





Cardiovascular examination reveals regular rhythm rate.  S1-S2 normal.  No S3 or

S4.  No discernible murmur noted.  Heart sounds are distant.  Heart rate 50 bpm.





Lungs reveal mostly clear breath sounds.  Scattered rhonchi are noted.  No 

crackles.  No wheezes.  Breath sounds are equal bilaterally.  3 L saturation is 

95%.





Abdomen soft bowel sounds are heard.  No masses or tenderness.





Extremities are intact.  No cyanosis clubbing or edema.





Skin is without rash or lesion.





Neurologic examination is improved.





- Labs


CBC & Chem 7: 


                                 08/14/23 09:50





                                 08/15/23 06:29





Assessment and Plan


Assessment: 





Altered mental status secondary to acute on chronic renal failure.  Creatinine 

7.1, S/P hemodialysis on 08/14/2023 with 2.6 L removed.  





Severe bradycardia secondary to hyperkalemia requiring transcutaneous pacing and

dopamine infusion improved.





Hypotension secondary to above, resolved.





Hyperkalemia secondary to acute renal failure with potassium of 7.4. 

Postdialysis potassium improved to 4.7.  





Mild transaminitis.





History of CVA/TIA.





History of hypertension.





History of congestive heart failure.





History of COPD.





Former smoker.


Plan: 





Plan dated 08/16/2023.





The patient is currently undergoing hemodialysis.  The patient is much more 

awake and alert.  Labs, x-rays, and medications are reviewed.  We will continue 

to follow the patient and make recommendations along the way.  She was initially

admitted with worsening renal failure, and hyperkalemia, with bradycardia.  Most

of that has improved.  Prognosis is still guarded.


Time with Patient: Less than 30 36.7

## 2023-08-16 NOTE — EEG
ELECTROENCEPHALOGRAM REPORT



CLINICAL HISTORY:

This is a 63-year-old woman with recurrent syncopal episodes.  The video EEG is

obtained to evaluate for seizure and epileptiform activity.



EEG TYPE:

A routine 21-channel EEG with video using the 10/20 electrode placement system.



DESCRIPTION:

Wakefulness is only obtained.  The background consists of low-to-moderate 
voltage of 7

to 8 Hz activity.  There is no physiological sleep architecture seen.



There is no focal slowing.



There is a generalized high-voltage semi-rhythmic to rhythmic 1.5 to 2.5 Hz 
delta activity

with frontal predominanced lasting between 2 to 5 seconds, (GRDA,

formerly known as FIRDA).



INTERICTAL AND ICTAL:

None.



ACTIVATION PROCEDURE:

Photic stimulation did not evoke a posterior driving response.  There is no 
abnormality

during the photic stimulation.



Hyperventilation is not performed.



CLINICAL INTERPRETATION:

This is an abnormal routine EEG.  The background slowing in addition with GRDA 
with

frontal predominance (formerly known as FIRDA), is consisted mild-to-moderate

encephalopathy, possible due to underlying toxic metabolic derangement versus 
cerebral

dysfunction.  There is no focal slowing, epileptiform discharge, or seizure on 
the EEG.

Clinical correlation is recommended.



MMODL / IJN: [id number] / Job#: [id number]

[email or web address]

## 2023-08-16 NOTE — P.PN
Subjective





This is a pleasant 63 years old female with multiple medical problems


Sent from [facility] to the emergency room for altered mental status. 

Patient was seen well in the emergency room, she was with altered mental status,

she does not respond to verbal or tactile stimuli.  Does not follow command.  

There is no asymmetry noted.  However examination is limited by patient mental 

status..


Also got 1 dose of IV Ativan in the emergency room which made her more sleepy


Patient is admitted on the stage renal disease on hemodialysis, she preferred 

with dialysis.  Apparently a very monday, wednesday and friday  


Admission her creatinine was 7.1, while at [facility] was 5.2, patient 

also has hyperkalemia with potassium 7.4 on admission.  Patient is to get urgent

hemodialysis in the emergency room.


Blood pressure 112/58, heart rate 85, patient is mildly tachypneic.  Marked 

leukocytosis with a 11.7.


Chest x-ray showed pulmonary venous congestion with scattered interstitial 

edema.  Repeat chest x-ray this facility showed cardiomegaly with no acute 

process.


 CT of the pressure with no acute intracranial process.


Patient also on dopamine drip.


Patient is in critical condition is going to be monitored the ICU currently.





08/15/2023 


patient is awake alert today, she is now she is an [facility] and she 

knows the daytime and date and the name of the president, she follows commands 

and has insight


She looks a little bit agitated and at bedtime today, she got bothered easily 

with frequent questioning.


She is complaining of from headache described by the patient as 10/10 all over, 

nonspecific in character associated with vomiting (patient has been vomiting for

the last 2-3 days as she describes) and dizziness.  She describes dizziness as a

room is spinning around her.


She denies any other specific symptoms to me.


Afebrile, vital signs stable


Laps improvement with potassium down to 5.6 and creatinine down to 4.0.  WBC 

11.7 and hemoglobin 11.4


Chest x-ray showing pulmonary venous congestion with vascular interstitial edema


Repeat chest x-ray is pending.  I reviewed myself with no significant change 

from yesterday 





08/16/2023


 awake and alert today somewhat lethargic and somnolent but she keeps her 

eye opening and she answers questions appropriately and follow commands.


She is going for another hemodialysis this morning.


She still complaining of from dizziness and she states that the room is [facility].  CT of the brain was negative for acute process.


This morning she was bradycardic with heart rate wasn't for these we lowered her

metoprolol 50 mg down to 12.5 mg with close monitoring.


Patient also not eating well.











Objective





- Vital Signs


Vital signs: 


                                   Vital Signs











Temp  98.4 F   08/16/23 04:00


 


Pulse  46 L  08/16/23 08:00


 


Resp  16   08/16/23 08:00


 


BP  152/76   08/16/23 08:00


 


Pulse Ox  93 L  08/16/23 04:00


 


FiO2      








                                 Intake & Output











 08/15/23 08/16/23 08/16/23





 18:59 06:59 18:59


 


Intake Total 300 120 


 


Output Total 800  


 


Balance -500 120 


 


Weight  83 kg 


 


Intake:   


 


  Oral 0 120 


 


  Hemodialysis 300  


 


Output:   


 


  Hemodialysis 800  


 


Other:   


 


  Voiding Method Bedside Commode Bedside Commode Bedside Commode


 


  # Voids  1 1














- Exam





-GENERAL: The patient is alert and oriented x3, anxious not in any acute 

distress. Well developed, well nourished. 


HEENT: Pupils are round and equally reacting to light. EOMI. No scleral icterus.

No conjunctival pallor. Normocephalic, atraumatic. No pharyngeal erythema. No 

thyromegaly. 


CARDIOVASCULAR: S1 and S2 present. No murmurs, rubs, or gallops. 


PULMONARY: Chest is clear to auscultation, no wheezing , no crackles. 


ABDOMEN: Soft, nontender, nondistended, normoactive bowel sounds. No palpable 

organomegaly. 


MUSCULOSKELETAL: No joint swelling or deformity. 


EXTREMITIES: No cyanosis, clubbing, or pedal edema. 


NEUROLOGICAL: Gross neurological examination did not reveal any focal deficits. 


SKIN: No rashes. no petechiae.





- Labs


CBC & Chem 7: 


                                 08/14/23 09:50





                                 08/15/23 06:29





Assessment and Plan


Assessment: 





Altered mental status most likely to metabolic/toxic encephalopathy, improving 


headache with vertigo


End-stage renal disease(Monday Wednesday and Friday, presents with worsening 

creatinine and hyperkalemia


Mild leukocytosis


COPD, 


History of CVA/TIA


History of hypothyroidism





Plan: 





There were dose of metoprolol down to (12.5 mg) monitor of the heart rate


Neurology consult


Another hemodialysis as per nephrology team on the case.  Monitor Creatinine and

hyperkalemia improvement


Pulmonary/critical care team consult already on the case


Telemetry monitoring.


Neuro check


Labs and medication were reviewed..  Continue same treatment.  Continue with 

symptomatic treatment.  Resume home medication.  Monitor labs and vitals.  DVT 

and GI prophylaxis.  Further recommendations as per clinical course of the 

patient


DVT prophylaxis: Mechanical


GI Prophylaxis: Pepcid


Prognosis is guarded

## 2023-08-16 NOTE — P.CNNES
History of Present Illness


Consult date: 08/16/23


Requesting physician: Isaiah E Anuja


Reason for Consult: vertigo, headache/vomiting


History of Present Illness: 


A 63-year-old woman with history of end-stage renal disease on dialysis, 

congestive heart failure, hypothyroidism who is having the dizziness.  Patient 

states that for the past up will days to week she's been having dizziness and 

she feels the room is spinning she been having vomiting episode but she said her

dizziness happens when her blood pressure goes down.  She stated that her blood 

pressure 1 down the side and she had a fall recently and she says she blacks 

out.  She denies any history of seizures.  She's been having multiple episode of

hypotension according the patient.  Currently she denies of any double vision.  

Any focal weakness.  She feels her entire body is weak.  She cannot describe the

syncopal episode but states again that the CDs happen when her blood pressure 

goes down as a result she falls and she recently hurt her head.


Court the patient nurse her heart rate was as low as 20s during this admission 

and she required a pacemaker that she is having a heart rate that goes in the 3

0s while she is sleeping.


On presentation her blood pressure was 96/76 and the repeat was 86/61 which has 

improved.





Some of the workup during this hospital visit consisted of:


Heart rates upon presentation was in the 40s


Initial potassium is 7.4 that improved to 4.7.


Creatinine 7.11 improved to 4.08.


AST 71 ALT of 39.


TSH is 1.230


CT of the head is reported as no acute intracranial process.  Left frontal soft 

tissue contusion.  No evidence of fracture.  I personally reviewed the CT of 

head and I agree with the report.








Review of Systems


Review of system:  The 12 point system was reviewed and apparent positive and 

negative per HPI.








Past Medical History


Past Medical History: Heart Failure, COPD, CVA/TIA, Dialysis, GERD/Reflux, 

Thyroid Disorder


History of Any Multi-Drug Resistant Organisms: Unobtainable


Additional Past Surgical History / Comment(s): Dialysis cath placement


Past Anesthesia/Blood Transfusion Reactions: No Reported Reaction


Past Psychological History: No Psychological Hx Reported


Smoking Status: Unknown if ever smoked


Past Alcohol Use History: Unable to Obtain


Past Drug Use History: Unable to Obtain





Medications and Allergies


                                Home Medications











 Medication  Instructions  Recorded  Confirmed  Type


 


Albuterol Nebulized [Ventolin 2.5 mg INHALATION RT-Q6H PRN 08/14/23 08/14/23 

History





Nebulized]    


 


Albuterol Sulfate [Albuterol 1 puff INHALATION RT-Q4H PRN 08/14/23 08/14/23 

History





Sulfate Hfa]    


 


Atorvastatin Calcium 20 mg PO DAILY 08/14/23 08/14/23 History


 


Azithromycin [Zithromax Z Pack] See Taper PO AS DIRECTED 08/14/23 08/14/23 

History


 


Benzonatate [Tessalon Perles] 100 - 200 mg PO TID PRN 08/14/23 08/14/23 History


 


Budesonide/Formoterol Fumarate 2 puff INHALATION RT-BID 08/14/23 08/14/23 

History





[Symbicort 160-4.5 Mcg Inhaler]    


 


Bumetanide [BUMEX] 4 mg PO TID 08/14/23 08/14/23 History


 


Dicyclomine [Bentyl] 10 mg PO TID 08/14/23 08/14/23 History


 


Fluticasone Nasal Spray [Flonase 2 spray EA NOSTRIL DAILY 08/14/23 08/14/23 

History





Nasal Spray]    


 


Gabapentin [Neurontin] 200 mg PO BID 08/14/23 08/14/23 History


 


Isosorbide Mononitrate ER [Imdur] 30 mg PO DAILY 08/14/23 08/14/23 History


 


Lidocaine-Prilocaine Cream [Emla 1 applic TOPICAL AS DIRECTED 08/14/23 08/14/23 

History





Cream 2.5%/2.5%]    


 


Loperamide [Imodium] 4 mg PO QID PRN 08/14/23 08/14/23 History


 


Metoprolol Succinate (ER) [Toprol 50 mg PO HS 08/14/23 08/15/23 History





Xl]    


 


Midodrine HCl 10 mg PO TID 08/14/23 08/14/23 History


 


Montelukast [Singulair] 10 mg PO DAILY 08/14/23 08/14/23 History


 


Omeprazole [PriLOSEC] 20 mg PO AC-SUPPER 08/14/23 08/14/23 History


 


Ondansetron Odt [Zofran Odt] 4 mg PO Q8HR PRN 08/14/23 08/14/23 History


 


SILVER sulfADIAZINE Cream 1 applic TOPICAL BID 08/14/23 08/14/23 History





[Silvadene 1% Cream]    


 


Sevelamer Carbonate 1,600 - 2,400 mg PO AS DIRECTED 08/14/23 08/14/23 History


 


amLODIPine [Norvasc] 10 mg PO DAILY 08/14/23 08/14/23 History


 


hydrALAZINE HCL 10 mg PO TID 08/14/23 08/14/23 History


 


lisinopriL [Zestril] 10 mg PO DAILY 08/14/23 08/14/23 History


 


Ascorbic Acid [Vitamin C] 500 mg PO DAILY 08/15/23 08/15/23 History


 


Aspirin EC [Ecotrin Low Dose] 81 mg PO DAILY 08/15/23 08/15/23 History


 


Bismuth Subsalicylate 525 - 1,050 mg PO TID PRN 08/15/23 08/15/23 History





[Pepto-Bismol Ultra]    


 


Cetirizine HCl 10 mg PO DAILY 08/15/23 08/15/23 History


 


Cholecalciferol (Vitamin D3) 125 mcg PO DAILY 08/15/23 08/15/23 History





[Vitamin D3]    


 


Cranberry 15,000 Mg Tab 15,000 mg PO DAILY 08/15/23 08/15/23 History


 


Magnesium 250 mg PO DAILY 08/15/23 08/15/23 History


 


Melatonin/Pyridoxine HCl (B6) 1 tab PO HS PRN 08/15/23 08/15/23 History





[Melatonin-Vit B6  5-10 mg Tab]    


 


Multivit-Min/FA/Lycopen/Lutein 1 tab PO DAILY 08/15/23 08/15/23 History





[Centrum Silver Tablet]    


 


Potassium Gluconate 99 mg PO DAILY 08/15/23 08/15/23 History


 


Propylene Glycol [Systane Complete] 1 drop BOTH EYES HS 08/15/23 08/15/23 

History


 


Super B Complex 1 tab PO DAILY 08/15/23 08/15/23 History


 


Vitamin E (Dl,Tocopheryl Acet) 400 unit PO DAILY 08/15/23 08/15/23 History





[Vitamin E (400 Iu = 180 mg)]    


 


Zinc Gluconate [Zinc] 50 mg PO DAILY 08/15/23 08/15/23 History








                                    Allergies











Allergy/AdvReac Type Severity Reaction Status Date / Time


 


erythromycin base Allergy  Unknown Verified 08/14/23 11:17


 


Penicillins Allergy  Unknown Verified 08/14/23 11:17


 


Sulfa (Sulfonamide Allergy  Unknown Verified 08/14/23 11:17





Antibiotics)     


 


sulfacetamide Allergy  Unknown Verified 08/14/23 11:17














Physical Examination





- Vital Signs


Vital Signs: 


                                   Vital Signs











  Temp Pulse Resp BP Pulse Ox


 


 08/16/23 12:00   52 L  16  133/67 


 


 08/16/23 08:00   46 L  16  152/76 


 


 08/16/23 04:00  98.4 F  49 L  16  149/74  93 L


 


 08/16/23 01:18   48 L  16  


 


 08/16/23 00:00  98.5 F  48 L  16  144/74  94 L


 


 08/15/23 20:00   60  16  


 


 08/15/23 19:30      88 L


 


 08/15/23 16:01  98.6 F  49 L  16  108/48 


 


 08/15/23 15:07  97.6 F  51 L  20  93/52  96


 


 08/15/23 14:00   58 L   








                                Intake and Output











 08/15/23 08/16/23 08/16/23





 22:59 06:59 14:59


 


Intake Total 420  


 


Output Total 800  


 


Balance -380  


 


Intake:   


 


  Oral 120  


 


  Hemodialysis 300  


 


Output:   


 


  Hemodialysis 800  


 


Other:   


 


  Voiding Method Bedside Commode Bedside Commode Bedside Commode


 


  # Voids 0 1 1


 


  Weight  83 kg 











GENERAL: The patient is lying in bed and is not in acute distress.


CARDIO: Edema in lowers.


INTEGUMENTARY: Has erythematous skin rash that is rectangual in shin region and 

states happens due to her CHF.





NEUROLOGICAL:


Higher mental function: The patient is awake, alert, oriented to self, place and

 time.  Patient is following commands.  No aphasia and no neglect.


Cranial nerves: The pupils are round, equal and reactive to light.  Has moderate

 ptosis of bilateral eye.  EOM is limited is looking to right and left but felt 

slightly was able to do that but was able to look up and down.   Facial 

sensation is normal to touch throughout.  No facial weakness.    Hearing is 

normal bilaterally to hand rub.  Tongue is midline and moved side-to-side 

without any difficulty.  No dysarthria is noted.  Shoulder shrug is normal 

bilaterally.


Motor: The strength is limited in assessment individual muscle strength since 

has generalized pain but lifting all extremities upon lifting extremities and 

appears symmetrical.    Normal tone and bulk.  


Cerebellum: Normal finger to nose n bilaterally.


Sensation: Sensation is normal to touch throughout.  


Reflexes (right/left): Refused to be assessed.


Plantars are mute bilaterally. 








Results





- Laboratory Findings


CBC and BMP: 


                                 08/14/23 09:50





                                 08/15/23 06:29


Abnormal Lab Findings: 


                                  Abnormal Labs











  08/14/23 08/14/23 08/14/23





  09:50 09:50 10:46


 


WBC  11.7 H  


 


RBC  3.26 L  


 


Hgb  10.4 L  


 


Hct  31.7 L  


 


Neutrophils #  8.5 H  


 


ABG pO2    129 H


 


ABG HCO3    28 H


 


ABG Total CO2    29 H


 


ABG O2 Saturation    98.4 H


 


Sodium   134 L 


 


Potassium   7.4 H* 


 


Chloride   94 L 


 


BUN   54 H 


 


Creatinine   7.11 H* 


 


Glucose   118 H 


 


Calcium   


 


Magnesium   4.0 H 


 


AST   71 H 


 


ALT   39 H 














  08/15/23





  06:29


 


WBC 


 


RBC 


 


Hgb 


 


Hct 


 


Neutrophils # 


 


ABG pO2 


 


ABG HCO3 


 


ABG Total CO2 


 


ABG O2 Saturation 


 


Sodium  135 L


 


Potassium  5.6 H


 


Chloride 


 


BUN  28 H


 


Creatinine  4.08 H


 


Glucose 


 


Calcium  8.1 L


 


Magnesium 


 


AST 


 


ALT 














Assessment and Plan


Assessment: 


This is a 63-year-old woman history of end-stage renal disease on dialysis, 

congestive heart failure who is having syncopal episode at home and she states 

the her episode happened upon her blood pressure dropping.  As a result she had 

a fall and hit her head.  She's complaining of the dizziness as well as was 

having vomiting and the vomiting is resolved.  He is having generalized 

weakness.  Upon presented to our hospital her systolic blood pressure was in the

 80s.








Acute Vertigo: Seems due to hypotensive episodes and possible bradycardia.  

Cannot rule out central cause especial on examination she had restriction of 

movement of lateral movement as well as ptosis.  CT of the head is negative for 

any acute or subacute ischemia.


Syncopal episodes likely due to her hypotensive episode.


BradyCardia in 40's but per patient's nurse where as low as 20-30's.


Hypotensive episodes as well as systolic in the 80s


Upper anemia as high as 7.4 --resolved


Generalized weakness


Congestive heart failure


End-stage renal disease on dialysis








Plan: 


I ordered MRI of the brain to rule out any central cause contributing to her 

restriction of lateral mobility of the eyes as well as the ptosis of the eyes.  

I also ordered the acetylcholine receptor antibody to rule out any myasthenia 

gravis contributing to her ptosis.  The acetylcholine receptor antibody usually 

takes a long time to come back


For generalized weakness I ordered CPK, vitamin B-12, folate.


I ordered orthostatic vitals, 2-D echo, carotid duplex that could be 

contributing to her syncopal episode.  I also ordered a routine EEG and I doubt 

the syncopal episodes are due to seizure or epileptiform discharges and I feel 

it's mostly due to her hypotensive episode.


PT and OT are consulted


Patient was placed on midodrine 10 mg 1 tablet 3 times a day by primary team.  

Please avoid any further hypotensive episode.


We'll defer the rest of the medical management to the primary team and other 

specialists





Plan was discussed with the patient and as well as her nurse.


Thank you for the consultation





Time with Patient: Greater than 30

## 2023-08-16 NOTE — US
EXAMINATION TYPE: US carotid duplex BILAT

 

DATE OF EXAM: 8/16/2023

 

COMPARISON: CT 8/15/2023

 

CLINICAL INDICATION: Female, 63 years old with history of syncope; Syncope.

 

TECHNIQUE: Carotid duplex ultrasound examination. Indirect Doppler criteria was utilized.

 

FINDINGS:

 

EXAM MEASUREMENTS: 

 

RIGHT:  Peak Systolic Velocity (PSV) cm/sec

----- Right CCA:  125.9  

----- Right ICA:  219.8     

----- Right ECA:  146.3   

ICA/CCA ratio:  1.7    

 

RIGHT:  End Diastole cm/sec

----- Right CCA:  29.4   

----- Right ICA:  47.9      

----- Right ECA:  2.6     

 

LEFT:  Peak Systolic Velocity (PSV) cm/sec

----- Left CCA:  125.9  

----- Left ICA:  180.5   

----- Left ECA:  117.7  

ICA/CCA ratio:  1.4  

 

LEFT:  End Diastole cm/sec

----- Left CCA:  25.4  

----- Left ICA:  52.4   

----- Left ECA:  19.2 

 

VERTEBRALS (direction of flow):

Right Vertebral: Antegrade

Left Vertebral: Antegrade

 

Rhythm:  Normal

 

SONOGRAPHER NOTES: Plaque seen within bilateral bulbs, greater amount of plaque seen on the right li
e. Large focal plaque is within the carotid bulb on the right. Elevated velocities within right ICA, 
right ECA, left prox and mid CCA, and left ICA.

 

Incidental finding: Hypoechoic nodule seen within left thyroid lobe at mid with calcifications: 1.1 x
 0.8 x 0.8 cm.  

 

 

 

IMPRESSION:  

 

1. Moderate approaching severe stenosis right internal carotid artery approaching 69%.

2. Moderate narrowing between 50 and 69% left internal carotid artery based on velocities.

3. Incidental note made of a 1 cm nodule in the left lobe thyroid. Consider dedicated thyroid ultraso
und workup.

 

 

 

 

 

 

Criteria for Assigning % of Stenosis / Diameter reduction

(Estimation based on the indirect measurements of the internal carotid artery velocities (ICA PSV).

1.  Normal (no stenosis)=ICA PSV < 125 cm/s: ratio < 2.0: ICA EDV<40 cm/s.

2. Less than 50% stenosis=ICA PSV < 125 cm/s: ratio < 2.0: ICA EDV<40 cm/s.

3.  50 to 69% stenosis=ICA PSV of 125 to 230 cm/s: ration 2.0 ? 4.0: ICA EDV  cm/s.

4.  Greater than 70% stenosis to near occlusion= ICA PSV > 230 cm/s: ratio > 4.0: ICA EDV > 100 cm/s.
 

5.  Near occlusion= ICA PSV velocities may be low or undetectable: variable ratio and ICA EDV.

6.  Total occlusion=unable to detect flow.

## 2023-08-17 LAB
ANION GAP SERPL CALC-SCNC: 6 MMOL/L
BUN SERPL-SCNC: 15 MG/DL (ref 7–17)
CALCIUM SPEC-MCNC: 8.5 MG/DL (ref 8.4–10.2)
CHLORIDE SERPL-SCNC: 99 MMOL/L (ref 98–107)
CO2 SERPL-SCNC: 29 MMOL/L (ref 22–30)
GLUCOSE SERPL-MCNC: 93 MG/DL (ref 74–99)
POTASSIUM SERPL-SCNC: 3.4 MMOL/L (ref 3.5–5.1)
SODIUM SERPL-SCNC: 134 MMOL/L (ref 137–145)
VIT B12 SERPL-MCNC: 1075 PG/ML (ref 200–944)

## 2023-08-17 RX ADMIN — IOPAMIDOL PRN ML: 612 INJECTION, SOLUTION INTRAVENOUS at 20:24

## 2023-08-17 RX ADMIN — FLUTICASONE PROPIONATE SCH SPRAY: 50 SPRAY, METERED NASAL at 08:53

## 2023-08-17 RX ADMIN — MECLIZINE HYDROCHLORIDE PRN MG: 25 TABLET ORAL at 11:15

## 2023-08-17 RX ADMIN — BUTALBITAL, ACETAMINOPHEN, AND CAFFEINE PRN EACH: 50; 325; 40 TABLET ORAL at 08:51

## 2023-08-17 RX ADMIN — ISOSORBIDE MONONITRATE SCH MG: 30 TABLET, EXTENDED RELEASE ORAL at 08:52

## 2023-08-17 RX ADMIN — MECLIZINE HYDROCHLORIDE PRN MG: 25 TABLET ORAL at 20:24

## 2023-08-17 RX ADMIN — DICYCLOMINE HYDROCHLORIDE SCH MG: 10 CAPSULE ORAL at 08:52

## 2023-08-17 RX ADMIN — ATORVASTATIN CALCIUM SCH MG: 20 TABLET, FILM COATED ORAL at 08:52

## 2023-08-17 RX ADMIN — DICYCLOMINE HYDROCHLORIDE SCH MG: 10 CAPSULE ORAL at 16:54

## 2023-08-17 RX ADMIN — ONDANSETRON PRN MG: 4 TABLET, ORALLY DISINTEGRATING ORAL at 20:24

## 2023-08-17 RX ADMIN — MONTELUKAST SODIUM SCH MG: 10 TABLET, FILM COATED ORAL at 08:52

## 2023-08-17 RX ADMIN — IOPAMIDOL PRN ML: 612 INJECTION, SOLUTION INTRAVENOUS at 21:18

## 2023-08-17 RX ADMIN — ONDANSETRON PRN MG: 4 TABLET, ORALLY DISINTEGRATING ORAL at 11:15

## 2023-08-17 RX ADMIN — BUDESONIDE AND FORMOTEROL FUMARATE DIHYDRATE SCH PUFF: 160; 4.5 AEROSOL RESPIRATORY (INHALATION) at 20:45

## 2023-08-17 RX ADMIN — SEVELAMER CARBONATE SCH MG: 800 TABLET, FILM COATED ORAL at 11:16

## 2023-08-17 RX ADMIN — SEVELAMER CARBONATE SCH MG: 800 TABLET, FILM COATED ORAL at 16:55

## 2023-08-17 RX ADMIN — BUTALBITAL, ACETAMINOPHEN, AND CAFFEINE PRN EACH: 50; 325; 40 TABLET ORAL at 20:24

## 2023-08-17 RX ADMIN — DICYCLOMINE HYDROCHLORIDE SCH MG: 10 CAPSULE ORAL at 20:24

## 2023-08-17 RX ADMIN — BUDESONIDE AND FORMOTEROL FUMARATE DIHYDRATE SCH PUFF: 160; 4.5 AEROSOL RESPIRATORY (INHALATION) at 08:10

## 2023-08-17 RX ADMIN — MIDODRINE HYDROCHLORIDE SCH MG: 5 TABLET ORAL at 08:52

## 2023-08-17 NOTE — CA
Transthoracic Echo Report 

 Name: Nena Rodriguez 

 MRN:    [id number] 

 Age:    63     Gender:     F 

 

 :    1960 

 Exam Date:     2023 08:16 

 Exam Location: [facility] 

 Ht (in):     60     Wt (lb):     182 

 Ordering Physician:        Mauro Monae MD 

 Attending/Referring Phys: 

 Technician         Dax Ernandez 

 Procedure CPT: 

 Indications:       Syncope 

 

 Cardiac Hx: 

 Technical Quality:      Fair 

 Contrast 1:                                Total Dose (mL): 

 Contrast 2:                                Total Dose (mL): 

 

 MEASUREMENTS  (Male / Female) Normal Values 

 2D ECHO 

 LV Diastolic Diameter PLAX        4.0 cm                4.2 - 5.9 / 3.9 - 5.3 cm 

 LV Systolic Diameter PLAX         2.9 cm                 

 IVS Diastolic Thickness           1.0 cm                0.6 - 1.0 / 0.6 - 0.9 cm 

 LVPW Diastolic Thickness          0.8 cm                0.6 - 1.0 / 0.6 - 0.9 cm 

 LV Relative Wall Thickness        0.4                    

 RV Internal Dim ED PLAX           3.0 cm                 

 LVOT Diameter                     2.0 cm                 

 Aortic Root Diameter              2.6 cm                 

 LA Systolic Diameter LX           3.4 cm                3.0 - 4.0 / 2.7 - 3.8 cm 

 LV Diastolic Volume MOD BP        59.4 cm???              67 - 155 / 56 - 104 cm??? 

 LV Systolic Volume MOD BP         27.1 cm???               - 58 / 19 - 49 cm??? 

 LV Ejection Fraction MOD BP       54.3 %                >= 55  % 

 LV Cardiac Index MOD BP           810.5 cm???/min???m???       

 LV Diastolic Volume MOD 4C        60.8 cm???               

 LV Systolic Volume MOD 4C         30.8 cm???               

 LV Ejection Fraction MOD 4C       49.3 %                 

 LV Cardiac Index MOD 4C           754.4 cm???/min???m???       

 LV Diastolic Length 4C            7.2 cm                 

 LV Systolic Length 4C             6.3 cm                 

 LV Diastolic Volume MOD 2C        57.7 cm???               

 LV Systolic Volume MOD 2C         22.6 cm???               

 LV Ejection Fraction MOD 2C       60.8 %                 

 LV Cardiac Index MOD 2C           881.0 cm???/min???m???       

 LV Diastolic Length 2C            7.1 cm                 

 LV Systolic Length 2C             6.0 cm                 

 LA Volume                         79.1 cm???              18 - 58 / 22 - 52 cm??? 

 

 DOPPLER 

 AV Peak Velocity                  202.9 cm/s             

 AV Peak Gradient                  16.5 mmHg              

 AV Mean Velocity                  135.8 cm/s             

 AV Mean Gradient                  8.8 mmHg               

 AV Velocity Time Integral         52.6 cm                

 LVOT Peak Velocity                185.1 cm/s             

 LVOT Peak Gradient                13.7 mmHg              

 AV Area Cont Eq pk                2.9 cm???                

 MV Peak Velocity                  241.7 cm/s             

 MV Peak Gradient                  23.4 mmHg              

 MV Mean Velocity                  69.0 cm/s              

 MV Mean Gradient                  3.4 mmHg               

 MV Velocity Time Integral         75.7 cm                

 MR Peak Velocity                  480.1 cm/s             

 MR Peak Gradient                  92.2 mmHg              

 Mitral E Point Velocity           161.9 cm/s             

 Mitral A Point Velocity           87.5 cm/s              

 Mitral E to A Ratio               1.9                    

 MV Deceleration Time              337.0 ms               

 MV E' Velocity                    9.0 cm/s               

 Mitral E to MV E' Ratio           18.0                   

 TR Peak Velocity                  282.8 cm/s             

 TR Peak Gradient                  32.0 mmHg              

 Right Ventricular Systolic Press  37.1 mmHg              

 PV Peak Velocity                  134.6 cm/s             

 PV Peak Gradient                  7.2 mmHg               

 

 

 FINDINGS 

 Left Ventricle 

 Normal LV size and wall thickness.left ventricular ejection fraction is  

 estimated at 50-55 %. 

 

 Right Ventricle 

 Normal right ventricular size. RVSP= 35mmhg. 

 

 Right Atrium 

 Normal right atrial size. 

 

 Left Atrium 

 Normal left atrial size. 

 

 Mitral Valve 

 Structurally normal mitral valve. Moderate MR. 

 

 Aortic Valve 

 Trileaflet aortic valve. Trace AI. 

 

 Tricuspid Valve 

 Structurally normal tricuspid valve. Mild TR. 

 

 Pulmonic Valve 

 Pulmonic valve not well visualized. Trace PI. 

 

 Pericardium 

 Normal pericardium. 

 

 Aorta 

 Normal size aortic root and proximal ascending aorta. 

 

 CONCLUSIONS 

 Normal LV function 

 Moderate mitral regurgitation 

 Previewed by:  

 Dr. Tres Velez MD 

 (Electronically Signed) 

 Final Date:      2023 12:06

## 2023-08-17 NOTE — P.PN
Subjective





This is a pleasant 63 years old female with multiple medical problems


Sent from [facility] to the emergency room for altered mental status. 

Patient was seen well in the emergency room, she was with altered mental status,

she does not respond to verbal or tactile stimuli.  Does not follow command.  

There is no asymmetry noted.  However examination is limited by patient mental 

status..


Also got 1 dose of IV Ativan in the emergency room which made her more sleepy


Patient is admitted on the stage renal disease on hemodialysis, she preferred 

with dialysis.  Apparently a very monday, wednesday and friday  


Admission her creatinine was 7.1, while at [facility] was 5.2, patient 

also has hyperkalemia with potassium 7.4 on admission.  Patient is to get urgent

hemodialysis in the emergency room.


Blood pressure 112/58, heart rate 85, patient is mildly tachypneic.  Marked 

leukocytosis with a 11.7.


Chest x-ray showed pulmonary venous congestion with scattered interstitial 

edema.  Repeat chest x-ray this facility showed cardiomegaly with no acute 

process.


 CT of the pressure with no acute intracranial process.


Patient also on dopamine drip.


Patient is in critical condition is going to be monitored the ICU currently.





08/15/2023 


patient is awake alert today, she is now she is an [facility] and she 

knows the daytime and date and the name of the president, she follows commands 

and has insight


She looks a little bit agitated and at bedtime today, she got bothered easily 

with frequent questioning.


She is complaining of from headache described by the patient as 10/10 all over, 

nonspecific in character associated with vomiting (patient has been vomiting for

the last 2-3 days as she describes) and dizziness.  She describes dizziness as a

room is spinning around her.


She denies any other specific symptoms to me.


Afebrile, vital signs stable


Laps improvement with potassium down to 5.6 and creatinine down to 4.0.  WBC 

11.7 and hemoglobin 11.4


Chest x-ray showing pulmonary venous congestion with vascular interstitial edema


Repeat chest x-ray is pending.  I reviewed myself with no significant change 

from yesterday 





08/16/2023


 awake and alert today somewhat lethargic and somnolent but she keeps her 

eye opening and she answers questions appropriately and follow commands.


She is going for another hemodialysis this morning.


She still complaining of from dizziness and she states that the room is [facility].  CT of the brain was negative for acute process.


This morning she was bradycardic with heart rate wasn't for these we lowered her

metoprolol 50 mg down to 12.5 mg with close monitoring.


Patient also not eating well.





08/17/2023


She does not look very fatigued or tired although she stays in bed most of the 

time.  She still complaining from vertigo and headache therefore one-time dose 

of Norco is tried as well as meclizine to assess any improvement.  MRI of the 

brain was negative for acute process (MRI of the brain showing no acute 

intracranial process but numerous bilateral focal areas of abnormal signal white

matter are nonspecific and can be associated with hypertension, remote microvasc

ular ischemic white matter disease or demyelinating)


She has some evidence of right carotid artery stenosis about 69%, vascular 

surgery recommended no inpatient surgical intervention but outpatient CTA of the

head and neck and the site of the case.


Echocardiogram showing ejection fraction of 50-55% and moderate mitral 

regurgitation however patient remains bradycardic and currently she is not on be

ta blocker (at home she was on 50 mg at bedtime which was discontinued) we will 

continue monitor tomorrow and if no improvement in heart rate then may consider 

further steps.  TSH is normal


Patient still able to eat well, she still complaining from nausea and vomiting, 

she complains from vague abdominal pain and generalized tenderness but abdomen 

soft and symptoms of mild, because she is still not able to eat we will order CT

of the abdomen and pelvis without contrast with close monitoring


Hemodialysis per Nephrology


                               Active Medications











Generic Name Dose Route Start Last Admin





  Trade Name Freq  PRN Reason Stop Dose Admin


 


Acetaminophen/Butalbital/Caffeine  1 each  08/16/23 21:09  08/17/23 08:51





  Butalb/Apap/Caff -40mg Tab  PO   1 each





  Q4HR PRN   Administration





  Headache  


 


Albuterol Sulfate  2.5 mg  08/14/23 18:54  08/15/23 08:03





  Albuterol Nebulized 2.5 Mg/3 Ml  INHALATION   2.5 mg





  RT-Q6H PRN   Administration





  Shortness Of Breath  


 


Atorvastatin Calcium  20 mg  08/15/23 09:00  08/17/23 08:52





  Atorvastatin 20 Mg Tab  PO   20 mg





  DAILY MARKO   Administration


 


Benzonatate  200 mg  08/14/23 18:54 





  Benzonatate 100 Mg Cap  PO  





  TID PRN  





  Cough  


 


Budesonide/Formoterol Fumarate  2 puff  08/14/23 20:00  08/17/23 08:10





  Symbicort 160-4.5 Mcg Inhaler  INHALATION   2 puff





  RT-BID MARKO   Administration


 


Dicyclomine HCl  10 mg  08/14/23 22:00  08/17/23 16:54





  Dicyclomine 10 Mg Cap  PO   10 mg





  TID MARKO   Administration


 


Fluticasone Propionate  2 spray  08/15/23 09:00  08/17/23 08:53





  Fluticasone 50mcg/Spray Nasal 16gm  EA NOSTRIL   2 spray





  DAILY MARKO   Administration


 


Iopamidol  30 ml  08/17/23 18:04 





  Iopamidol Contrast (Oral Use) Vial  PO  08/18/23 18:05 





  Q60M PRN  





  CT Scan  


 


Isosorbide Mononitrate  30 mg  08/15/23 09:00  08/17/23 08:52





  Isosorbide Mononitrate Er 30 Mg Tab.Er.24h  PO   30 mg





  DAILY MARKO   Administration


 


Meclizine HCl  25 mg  08/17/23 10:53  08/17/23 11:15





  Meclizine 25 Mg Tab  PO   25 mg





  QID PRN   Administration





  Vertigo  


 


Montelukast Sodium  10 mg  08/15/23 09:00  08/17/23 08:52





  Montelukast 10 Mg Tab  PO   10 mg





  DAILY MARKO   Administration


 


Naloxone HCl  0.2 mg  08/14/23 11:32 





  Naloxone 0.4 Mg/Ml 1 Ml Vial  IV  





  Q2M PRN  





  Opioid Reversal  


 


Ondansetron HCl  4 mg  08/14/23 18:54  08/17/23 11:15





  Ondansetron Odt 4 Mg Tab  PO   4 mg





  Q8HR PRN   Administration





  Nausea  


 


Sevelamer Carbonate  2,400 mg  08/15/23 07:30  08/17/23 16:55





  Sevelamer 800 Mg Tab  PO   2,400 mg





  TID-W/MEALS MARKO   Administration


 


Sevelamer Carbonate  1,600 mg  08/14/23 19:04 





  Sevelamer 800 Mg Tab  PO  





  TID PRN  





  SNACKS  














Objective





- Vital Signs


Vital signs: 


                                   Vital Signs











Temp  97.5 F L  08/17/23 04:00


 


Pulse  40 L  08/17/23 13:46


 


Resp  16   08/17/23 11:21


 


BP  172/72   08/17/23 11:21


 


Pulse Ox  88 L  08/17/23 08:00


 


FiO2      








                                 Intake & Output











 08/16/23 08/17/23 08/17/23





 18:59 06:59 18:59


 


Intake Total 500 0 250


 


Output Total 3075  


 


Balance -2575 0 250


 


Weight  76.2 kg 76.2 kg


 


Intake:   


 


  Oral  0 250


 


  Hemodialysis 500  


 


Output:   


 


  Urine 75  


 


  Hemodialysis 3000  


 


Other:   


 


  Voiding Method Bedside Commode Bedside Commode Bedside Commode


 


  # Voids 1 1 1


 


  # Bowel Movements  1 














- Exam





-GENERAL: The patient is alert and oriented x3, anxious not in any acute 

distress. Well developed, well nourished. 


HEENT: Pupils are round and equally reacting to light. EOMI. No scleral icterus.

No conjunctival pallor. Normocephalic, atraumatic. No pharyngeal erythema. No 

thyromegaly. 


CARDIOVASCULAR: S1 and S2 present. No murmurs, rubs, or gallops. 


PULMONARY: Chest is clear to auscultation, no wheezing , no crackles. 


ABDOMEN: Soft, nontender, nondistended, normoactive bowel sounds. No palpable 

organomegaly. 


MUSCULOSKELETAL: No joint swelling or deformity. 


EXTREMITIES: No cyanosis, clubbing, or pedal edema. 


NEUROLOGICAL: Gross neurological examination did not reveal any focal deficits. 


SKIN: No rashes. no petechiae.





- Labs


CBC & Chem 7: 


                                 08/14/23 09:50





                                 08/17/23 11:56


Labs: 


                  Abnormal Lab Results - Last 24 Hours (Table)











  08/16/23 08/17/23 Range/Units





  10:36 11:56 


 


Sodium   134 L  (137-145)  mmol/L


 


Potassium   3.4 L  (3.5-5.1)  mmol/L


 


Creatinine   3.04 H  (0.52-1.04)  mg/dL


 


Vitamin B12  1075.0 H   (200.0-944.0)  pg/mL














Assessment and Plan


Assessment: 





Altered mental status most likely to metabolic/toxic encephalopathy, improving 


headache with vertigo


End-stage renal disease(Monday Wednesday and Friday, presents with worsening 

creatinine and hyperkalemia


Mild leukocytosis


COPD, 


History of CVA/TIA


History of hypothyroidism





Plan: 





Monitored heart rate.  Metoprolol on hold


Neurology consult.  MRI of the brain is negative for acute process.  Symptomatic

treatment of vertigo


Continue with hemodialysis as per nephrology team on the case.  Monitor 

Creatinine and hyperkalemia improvement


Pulmonary/critical care team consult already on the case


Vascular surgery recommend outpatient CTA of the head and neck


Telemetry


Follow-up CT of the abdomen and pelvis


Labs and medication were reviewed..  Continue same treatment.  Continue with 

symptomatic treatment.  Resume home medication.  Monitor labs and vitals.  DVT 

and GI prophylaxis.  Further recommendations as per clinical course of the 

patient


DVT prophylaxis: Mechanical


GI Prophylaxis: Pepcid


Prognosis is guarded

## 2023-08-17 NOTE — P.PN
Subjective


Progress Note Date: 08/17/23


Principal diagnosis: 





Renal failure, hyperkalemia.





This is a 63-year-old female patient with a known history of chronic obstructive

pulmonary disease, hypothyroidism, gastroesophageal reflux disease, CVA/TIA, 

congestive heart failure, end-stage renal disease on hemodialysis Monday Wednesday Friday.  She was on her way to dialysis but became altered and and a 

little distress and was brought to [facility] instead.  In the ER she was

found to have a pulse rate in the 20s low blood pressure and high potassium 

greater than 8.  She was placed on dopamine and given some atropine and 

eventually transcutaneously paced.  She was also treated with insulin, D50 on 

the bicarb and calcium.  She was transferred here to our ER for further 

evaluation and treatment.  White count 11.7.  Hemoglobin 10.4.  Platelets 173.  

Sodium 134.  Potassium 7.4.  Chloride 94.  Bicarb 29.  BUN 54.  Creatinine 7.11.

 Glucose 118.  Arterial blood gases on 100% nonrebreather mask revealed a PaO2 

of 129, pCO2 44 and a pH of 7.40.  She is seen today in consultation in the 

emergency department.  She is somewhat altered.  Restless. She is currently 

receiving urgent hemodialysis.  Remains on dopamine at 9 mcg/kg/m.  Heart rate 

in the 60s.  Blood pressure 101/53 with a mean of 69.  Saturations in the 90s.  

Chest x-ray reveals cardiomegaly but no acute cardiopulmonary process. 





The patient is seen today 08/15/2023 in follow-up in the emergency department.  

She is arousable.  She did receive hemodialysis yesterday with 2.6 L removed.  

Potassium had improved to 4.7.  Currently 5.6.  Sodium 135.  Bicarb 27.  BUN 28.

 Creatinine 4.08.  Chest x-ray shows cardiomegaly with chronic interstitial lung

disease.  Versus mild venous congestion.  She is continued on Symbicort, 

albuterol, Singulair.  She remains afebrile.  Hemodynamically stable.  On oxygen

at 2 L/m per nasal cannula.





Progress note dated 08/16/2023.





The patient is seen today in room 370.  She is currently undergoing hemodialysi

s.  She's not receiving any IV fluids.  She's currently on oxygen at 3 L by 

nasal cannula.  She appears to be much more awake and alert today than she was 

yesterday.  She was initially admitted from an outside hospital, for renal 

failure, and severe hyperkalemia.  Her mental status is been poor, but is much 

better today.  No new labs today as yet.  Labs from yesterday show sodium 135, 

potassium 5.6, chlorides 100, CO2 27, BUN 28, creatinine 4.08.  Brain CT showed 

no acute intracranial process.  She does have a left frontal soft tissue 

contusion.





Progress note dated 08/17/2023.





The patient is seen today in room 370.  She is currently not undergoing 

hemodialysis.  She continues on oxygen at 3 L.  No IV fluids.  Labs today 

include a sodium 134, potassium 3.4, chlorides 99, CO2 29, BUN 15, creatinine 

3.04.  TSH is normal.  The patient is a bit more awake today than she has been 

over the last few days.  Her mental status has improved, since she's had 

hemodialysis.  Brain MRI showed no acute intracranial process.





Objective





- Vital Signs


Vital signs: 


                                   Vital Signs











Temp  97.5 F L  08/17/23 04:00


 


Pulse  40 L  08/17/23 11:21


 


Resp  16   08/17/23 11:21


 


BP  172/72   08/17/23 11:21


 


Pulse Ox  88 L  08/17/23 08:00


 


FiO2      








                                 Intake & Output











 08/16/23 08/17/23 08/17/23





 18:59 06:59 18:59


 


Intake Total 500 0 250


 


Output Total 3075  


 


Balance -2575 0 250


 


Weight  76.2 kg 76.2 kg


 


Intake:   


 


  Oral  0 250


 


  Hemodialysis 500  


 


Output:   


 


  Urine 75  


 


  Hemodialysis 3000  


 


Other:   


 


  Voiding Method Bedside Commode Bedside Commode Bedside Commode


 


  # Voids 1 1 


 


  # Bowel Movements  1 














- Exam





No acute distress, lethargic, but less so than the day before, and much more 

with it.  Currently, she is on 3 L of oxygen.





HEENT examination is grossly unremarkable. 





Neck supple.  Full range of motion.  No adenopathy thyromegaly or neck vein 

distention.





Cardiovascular examination reveals regular rhythm rate.  S1-S2 normal.  No S3 or

S4.  No discernible murmur noted.  Heart sounds are distant.  Heart rate 49 bpm.





Lungs reveal mostly clear breath sounds.  Scattered rhonchi are noted.  No 

crackles.  No wheezes.  Breath sounds are equal bilaterally.  3 L saturation is 

95%.





Abdomen soft bowel sounds are heard.  No masses or tenderness.





Extremities are intact.  No cyanosis clubbing or edema.





Skin is without rash or lesion.





Neurologic examination is improved.





- Labs


CBC & Chem 7: 


                                 08/14/23 09:50





                                 08/17/23 11:56


Labs: 


                  Abnormal Lab Results - Last 24 Hours (Table)











  08/16/23 08/17/23 Range/Units





  10:36 11:56 


 


Sodium   134 L  (137-145)  mmol/L


 


Potassium   3.4 L  (3.5-5.1)  mmol/L


 


Creatinine   3.04 H  (0.52-1.04)  mg/dL


 


Vitamin B12  1075.0 H   (200.0-944.0)  pg/mL














Assessment and Plan


Assessment: 





Altered mental status secondary to acute on chronic renal failure.  Creatinine 

7.1, S/P hemodialysis on 08/14/2023 with 2.6 L removed.  





Severe bradycardia secondary to hyperkalemia requiring transcutaneous pacing and

dopamine infusion improved.





Hypotension secondary to above, resolved.





Hyperkalemia secondary to acute renal failure with potassium of 7.4. 

Postdialysis potassium improved to 4.7.  





Mild transaminitis.





History of CVA/TIA.





History of hypertension.





History of congestive heart failure.





History of COPD.





Former smoker.


Plan: 





Plan dated 08/16/2023.





The patient is currently undergoing hemodialysis.  The patient is much more 

awake and alert.  Labs, x-rays, and medications are reviewed.  We will continue 

to follow the patient and make recommendations along the way.  She was initially

admitted with worsening renal failure, and hyperkalemia, with bradycardia.  Most

of that has improved.  Prognosis is still guarded.





Plan dated 08/17/2023.





Currently, the patient appears to be doing reasonably well.  The patient is on a

couple liters of oxygen.  No IV fluids.  Her mental status has seen gradual 

improvement over the last few days.  Labs, x-rays, and medications are reviewed.

 No additional recommendations are made.  Patient overall prognosis remains 

guarded.  She does remain bradycardic.


Time with Patient: Less than 30

## 2023-08-17 NOTE — P.GSCN
History of Present Illness


Consult date: 08/17/23


Reason for Consult: 





Carotid stenosis


Requesting physician: Mauro Monae


History of present illness: 





This is a 63-year-old female who presented to the emergency department for 

generalized weakness.  She has a past medical history of heart failure, COPD, 

TIA, end-stage renal disease on dialysis, GERD, and thyroid disorder.  Patient 

states she has been weak, having episodes of dizziness where the room is been 

spinning, she has drops in her blood pressure and heart rate.  She states she's 

had these symptoms for several weeks.  She had a fall 3 weeks ago where she hit 

her head and left frontal contusion.  Patient denies any one-sided weakness, 

states that she did have some visual loss and she hit her head but that returned

rather quickly.  She denies any difficulty with her speech, no visual changes, 

or ipsilateral weakness.  She states she is just generally weak both upper and 

lower extremities.  She denies any previous history of stroke.  Neurology was 

consulted for vertigo and headache.  Carotid duplex was ordered and reported 

near 69% stenosis of the right ICA and 50-69% stenosis of the left ICA.  Vascul

ar surgery was consulted for carotid stenosis.  Patient currently denies any 

chest pain, shortness of breath, abdominal pain, fevers or chills.  She still 

states she just feels overall weak.





Review of Systems





A 14 point review systems was completed all pertinent positives and negatives as

stated in the HPI.





Past Medical History


Past Medical History: Heart Failure, COPD, CVA/TIA, Dialysis, GERD/Reflux, 

Thyroid Disorder


History of Any Multi-Drug Resistant Organisms: Unobtainable


Additional Past Surgical History / Comment(s): Dialysis cath placement


Past Anesthesia/Blood Transfusion Reactions: No Reported Reaction


Past Psychological History: No Psychological Hx Reported


Smoking Status: Unknown if ever smoked


Past Alcohol Use History: Unable to Obtain


Past Drug Use History: Unable to Obtain





Medications and Allergies


                                Home Medications











 Medication  Instructions  Recorded  Confirmed  Type


 


Albuterol Nebulized [Ventolin 2.5 mg INHALATION RT-Q6H PRN 08/14/23 08/14/23 

History





Nebulized]    


 


Albuterol Sulfate [Albuterol 1 puff INHALATION RT-Q4H PRN 08/14/23 08/14/23 

History





Sulfate Hfa]    


 


Atorvastatin Calcium 20 mg PO DAILY 08/14/23 08/14/23 History


 


Azithromycin [Zithromax Z Pack] See Taper PO AS DIRECTED 08/14/23 08/14/23 

History


 


Benzonatate [Tessalon Perles] 100 - 200 mg PO TID PRN 08/14/23 08/14/23 History


 


Budesonide/Formoterol Fumarate 2 puff INHALATION RT-BID 08/14/23 08/14/23 

History





[Symbicort 160-4.5 Mcg Inhaler]    


 


Bumetanide [BUMEX] 4 mg PO TID 08/14/23 08/14/23 History


 


Dicyclomine [Bentyl] 10 mg PO TID 08/14/23 08/14/23 History


 


Fluticasone Nasal Spray [Flonase 2 spray EA NOSTRIL DAILY 08/14/23 08/14/23 

History





Nasal Spray]    


 


Gabapentin [Neurontin] 200 mg PO BID 08/14/23 08/14/23 History


 


Isosorbide Mononitrate ER [Imdur] 30 mg PO DAILY 08/14/23 08/14/23 History


 


Lidocaine-Prilocaine Cream [Emla 1 applic TOPICAL AS DIRECTED 08/14/23 08/14/23 

History





Cream 2.5%/2.5%]    


 


Loperamide [Imodium] 4 mg PO QID PRN 08/14/23 08/14/23 History


 


Metoprolol Succinate (ER) [Toprol 50 mg PO HS 08/14/23 08/15/23 History





Xl]    


 


Midodrine HCl 10 mg PO TID 08/14/23 08/14/23 History


 


Montelukast [Singulair] 10 mg PO DAILY 08/14/23 08/14/23 History


 


Omeprazole [PriLOSEC] 20 mg PO AC-SUPPER 08/14/23 08/14/23 History


 


Ondansetron Odt [Zofran Odt] 4 mg PO Q8HR PRN 08/14/23 08/14/23 History


 


SILVER sulfADIAZINE Cream 1 applic TOPICAL BID 08/14/23 08/14/23 History





[Silvadene 1% Cream]    


 


Sevelamer Carbonate 1,600 - 2,400 mg PO AS DIRECTED 08/14/23 08/14/23 History


 


amLODIPine [Norvasc] 10 mg PO DAILY 08/14/23 08/14/23 History


 


hydrALAZINE HCL 10 mg PO TID 08/14/23 08/14/23 History


 


lisinopriL [Zestril] 10 mg PO DAILY 08/14/23 08/14/23 History


 


Ascorbic Acid [Vitamin C] 500 mg PO DAILY 08/15/23 08/15/23 History


 


Aspirin EC [Ecotrin Low Dose] 81 mg PO DAILY 08/15/23 08/15/23 History


 


Bismuth Subsalicylate 525 - 1,050 mg PO TID PRN 08/15/23 08/15/23 History





[Pepto-Bismol Ultra]    


 


Cetirizine HCl 10 mg PO DAILY 08/15/23 08/15/23 History


 


Cholecalciferol (Vitamin D3) 125 mcg PO DAILY 08/15/23 08/15/23 History





[Vitamin D3]    


 


Cranberry 15,000 Mg Tab 15,000 mg PO DAILY 08/15/23 08/15/23 History


 


Magnesium 250 mg PO DAILY 08/15/23 08/15/23 History


 


Melatonin/Pyridoxine HCl (B6) 1 tab PO HS PRN 08/15/23 08/15/23 History





[Melatonin-Vit B6  5-10 mg Tab]    


 


Multivit-Min/FA/Lycopen/Lutein 1 tab PO DAILY 08/15/23 08/15/23 History





[Centrum Silver Tablet]    


 


Potassium Gluconate 99 mg PO DAILY 08/15/23 08/15/23 History


 


Propylene Glycol [Systane Complete] 1 drop BOTH EYES HS 08/15/23 08/15/23 

History


 


Super B Complex 1 tab PO DAILY 08/15/23 08/15/23 History


 


Vitamin E (Dl,Tocopheryl Acet) 400 unit PO DAILY 08/15/23 08/15/23 History





[Vitamin E (400 Iu = 180 mg)]    


 


Zinc Gluconate [Zinc] 50 mg PO DAILY 08/15/23 08/15/23 History








                                    Allergies











Allergy/AdvReac Type Severity Reaction Status Date / Time


 


erythromycin base Allergy  Unknown Verified 08/14/23 11:17


 


Penicillins Allergy  Unknown Verified 08/14/23 11:17


 


Sulfa (Sulfonamide Allergy  Unknown Verified 08/14/23 11:17





Antibiotics)     


 


sulfacetamide Allergy  Unknown Verified 08/14/23 11:17














Surgical - Exam


                                   Vital Signs











Pulse Resp BP Pulse Ox


 


 48 L  20   96/76   86 L


 


 08/14/23 09:44  08/14/23 09:44  08/14/23 09:44  08/14/23 09:44














General appearance: The patient is alert, oriented, appears in no acute 

distress.


HET: Head is normocephalic and atraumatic.  Pupils are equal and reactive.  


Neck: Supple.


Heart: Regular.


Lungs: Equal expansion, normal respiratory effort.


Abdomen: Soft, nontender, nondistended.


Extremities: Normal skin color and turgor.  


Neurological: Alert and oriented 3.  Patient has generalized upper and lower 

extremity weakness.  Patient has no noted focal deficits.





Results





- Labs





                                 08/14/23 09:50





                                 08/17/23 11:56


                  Abnormal Lab Results - Last 24 Hours (Table)











  08/16/23 08/17/23 Range/Units





  10:36 11:56 


 


Sodium   134 L  (137-145)  mmol/L


 


Potassium   3.4 L  (3.5-5.1)  mmol/L


 


Creatinine   3.04 H  (0.52-1.04)  mg/dL


 


Vitamin B12  1075.0 H   (200.0-944.0)  pg/mL








                                 Diabetes panel











  08/17/23 Range/Units





  11:56 


 


Sodium  134 L  (137-145)  mmol/L


 


Potassium  3.4 L  (3.5-5.1)  mmol/L


 


Chloride  99  ()  mmol/L


 


Carbon Dioxide  29  (22-30)  mmol/L


 


BUN  15  (7-17)  mg/dL


 


Creatinine  3.04 H  (0.52-1.04)  mg/dL


 


Glucose  93  (74-99)  mg/dL


 


Calcium  8.5  (8.4-10.2)  mg/dL








                                  Calcium panel











  08/17/23 Range/Units





  11:56 


 


Calcium  8.5  (8.4-10.2)  mg/dL








                                 Pituitary panel











  08/17/23 Range/Units





  11:56 


 


Sodium  134 L  (137-145)  mmol/L


 


Potassium  3.4 L  (3.5-5.1)  mmol/L


 


Chloride  99  ()  mmol/L


 


Carbon Dioxide  29  (22-30)  mmol/L


 


BUN  15  (7-17)  mg/dL


 


Creatinine  3.04 H  (0.52-1.04)  mg/dL


 


Glucose  93  (74-99)  mg/dL


 


Calcium  8.5  (8.4-10.2)  mg/dL








                                  Adrenal panel











  08/17/23 Range/Units





  11:56 


 


Sodium  134 L  (137-145)  mmol/L


 


Potassium  3.4 L  (3.5-5.1)  mmol/L


 


Chloride  99  ()  mmol/L


 


Carbon Dioxide  29  (22-30)  mmol/L


 


BUN  15  (7-17)  mg/dL


 


Creatinine  3.04 H  (0.52-1.04)  mg/dL


 


Glucose  93  (74-99)  mg/dL


 


Calcium  8.5  (8.4-10.2)  mg/dL














- Imaging


Comments: 





Carotid Doppler: Moderate approaching severe stenosis right internal carotid 

artery approaching 69%.  Moderate narrowing between 50 and 69% left internal 

carotid artery based on velocities.  Incidental note made of a 1 cm nodule in 

the left lobe thyroid.  Consider dedicated thyroid ultrasound workup.


Right ICA .8, ICA/CCA ratio 1.7


Left PSV .5, ICA/CCA ratio 1.4





Brain MRI


No acute intracranial process.  Numerous bilateral focal areas of abnormal 

signal white matter are nonspecific and can be associated with hypertension, 

remote microvascular ischemic white matter disease or demyelinating correlate 

clinically.  Left frontal soft tissues of cutaneous hematoma stable





CT brain without contrast


No acute intracranial process.  Left frontal soft tissue contusion.  No evidence

of fracture





Echocardiogram


Normal LV function.  EF 50-55%.  Moderate mitral regurgitation.





EEG


Abnormal routine EEG background slowing in addition with GERD A with frontal 

predominance formally known as upright RDA, consists of mild to moderate 

encephalopathy, possibly due to underlying toxic metabolic derangement versus ce

rebral dysfunction.  There is no focal slowing, eliptoform discharge or seizure 

on the EEG





Assessment and Plan


Assessment: 





1.  Asymptomatic carotid bilateral stenosis


2.  Vertigo


3.  Generalized Weakness


4.  Bradycardia


5.  Hypotensive events now resolved


6.  History of end-stage renal disease on hemodialysis


7.  COPD





Plan: 





1.  Continue symptomatic and supportive care


2.  Recommend CT angiogram head and neck, this can be done as an outpatient and 

patient prefers that


3.  Continue atorvastatin


4.  Recommend outpatient follow-up with vascular surgery for carotid 

surveillance


5.  No indication at this time for vascular surgical intervention


6.  Continue with recommendations from neurology


Thank you for this consultation, we will sign off at this time.





The impression and plan of care has been dictated as directed.








I performed a history and examination of this patient,  discussed the same with 

the dictator.  I agree with the dictator's note ,documented as a scribe.  Any 

additional findings or plans will be noted.

## 2023-08-17 NOTE — P.PN
Subjective


Progress Note Date: 08/17/23


The patient seen at bedside and she feels about the same.  Denies of any new 

neurological issues.  She continues to have some dizziness.  Denies of any focal

weakness.  She stated that she had old visual issues and had surgery and her 

eyes used to be crossed and much worse.








Objective





- Vital Signs


Vital signs: 


                                   Vital Signs











Temp  97.5 F L  08/17/23 04:00


 


Pulse  40 L  08/17/23 13:46


 


Resp  16   08/17/23 11:21


 


BP  172/72   08/17/23 11:21


 


Pulse Ox  88 L  08/17/23 08:00


 


FiO2      








                                 Intake & Output











 08/16/23 08/17/23 08/17/23





 18:59 06:59 18:59


 


Intake Total 500 0 250


 


Output Total 3075  


 


Balance -2575 0 250


 


Weight  76.2 kg 76.2 kg


 


Intake:   


 


  Oral  0 250


 


  Hemodialysis 500  


 


Output:   


 


  Urine 75  


 


  Hemodialysis 3000  


 


Other:   


 


  Voiding Method Bedside Commode Bedside Commode Bedside Commode


 


  # Voids 1 1 1


 


  # Bowel Movements  1 














- Exam


GENERAL: The patient is lying in bed and is not in acute distress.


CARDIO: Edema in lowers.


INTEGUMENTARY: Has erythematous skin rash that is rectangual in shin region and 

states happens due to her CHF.





NEUROLOGICAL:


Higher mental function: The patient is awake, alert, oriented to self, place and

time.  Patient is following commands.  No aphasia and no neglect.


Cranial nerves: The pupils are round, equal and reactive to light.  Has moderate

ptosis of bilateral eye.  EOM is limited with lateral gaze (worse (right lateral

than left lateral gaze).  No restriction looking up and down.   rFacial 

sensation is normal to touch throughout.  No facial weakness.    Hearing is 

normal bilaterally to hand rub.  Tongue is midline and moved side-to-side 

without any difficulty.  No dysarthria is noted.  Shoulder shrug is normal 

bilaterally.


Motor: The strength is limited in assessment individual muscle strength since 

has generalized pain but lifting all extremities upon lifting extremities and 

appears symmetrical.    Normal tone and bulk.  


Cerebellum: Normal finger to nose n bilaterally.


Sensation: Sensation is normal to touch throughout.  


Reflexes (right/left): Refused to be assessed.


Plantars are mute bilaterally. 





Some of the workup during this hospital visit consisted of:


Heart rates upon presentation was in the 40s


Initial potassium is 7.4 that improved to 4.7.


Creatinine 7.11 improved to 4.08--3.04


Vital B12 is 1075


TSH is a 1.230


CK levels 113.


AST 71 ALT of 39.


TSH is 1.230


CT of the head is reported as no acute intracranial process.  Left frontal soft 

tissue contusion.  No evidence of fracture.  I personally reviewed the CT of 

head and I agree with the report.


MRI of the brain without is reported as no acute intracranial process.  Numerous

bilateral focal area of abnormal signal white matter are nonspecific and can be 

an associate with hypertension, remote microvascular ischemic white matter 

disease or demyelinating the correlate clinically.  Left frontal soft tissue soto

bcutaneous hematoma stable.  I personally reviewed the MRI and I agree there is 

no acute or subacute ischemia.  Regarding the FLAIR white matter 

hyperintensities I feel they're not specific


Carotid duplex is reported as moderate approaching severe stenosis of right 

internal carotid artery approaching 69%.  Moderate narrowing between 50-69% left

ICA to carotid artery based on velocity.  Incidental note made on 1 cm nodule in

the left lobe thyroid.  Consider dedicated thyroid ultrasound workup.











- Labs


CBC & Chem 7: 


                                 08/14/23 09:50





                                 08/17/23 11:56


Labs: 


                  Abnormal Lab Results - Last 24 Hours (Table)











  08/16/23 08/17/23 Range/Units





  10:36 11:56 


 


Sodium   134 L  (137-145)  mmol/L


 


Potassium   3.4 L  (3.5-5.1)  mmol/L


 


Creatinine   3.04 H  (0.52-1.04)  mg/dL


 


Vitamin B12  1075.0 H   (200.0-944.0)  pg/mL














Assessment and Plan


Assessment: 


This is a 63-year-old woman history of end-stage renal disease on dialysis, 

congestive heart failure who is having syncopal episode at home and she states 

the her episode happened upon her blood pressure dropping.  As a result she had 

a fall and hit her head.  She's complaining of the dizziness as well as was 

having vomiting and the vomiting is resolved.  He is having generalized 

weakness.  Upon presented to our hospital her systolic blood pressure was in the

80s.








Acute Vertigo: Seems due to hypotensive episodes and possible bradycardia.  On 

MRI there is no acute or subacute ischemia.  


Carotid stenosis is right ICA of close to 69% while left is between 50-69% on 

carotid duplex.  I doubt this is the culprit for the patient's vertigo.


Syncopal episodes likely due to her hypotensive episode.  EEG is negative for 

any seizure or any epileptiform discharges.


Patient has bilateral ptosis as well as restriction movement of eyes on the 

lateral gaze more restriction look into the right and left and patient stated 

that she had I correction in the past I doubt this is a central cause and I 

think this is more ophthalmological related that seems old.


BradyCardia in 40's but per patient's nurse where as low as 20-30's.


Hypotensive episodes as well as systolic in the 80s


hyperkalenia as high as 7.4 --resolved


Generalized weakness


Congestive heart failure


End-stage renal disease on dialysis








Plan: 


Patient has bilateral ptosis as well as restriction movement of eyes on the 

lateral gaze more restriction look into the right and left and patient stated 

that she had I correction in the past I doubt this is a central cause and I 

think this is more ophthalmological related that seems old.  Acetylcholine 

receptor antibody is ordered and is pending.  Commended the patient follow up 

with ophthalmologist and neurologist for further evaluation.


MRI the brain was negative for any acute or subacute ischemic stroke.


Regarding the bilateral carotid stenosis between 50-69% I feel they're 

asymptomatic but consulted vascular surgery team for further input.


Pending orthostatic vitals


PT and OT are consulted


Patient was placed on midodrine 10 mg 1 tablet 3 times a day by primary team.  

Please avoid any further hypotensive episode.


We'll defer the rest of the medical management to the primary team and other 

specialists





Plan was discussed with the patient.


Otherwise no additional neurological workup.





Time with Patient: Less than 30

## 2023-08-17 NOTE — MR
EXAMINATION TYPE: MR brain wo con

 

DATE OF EXAM: 8/17/2023

 

COMPARISON: CT brain 8/15/2023

 

HISTORY: Vertigo

 

TECHNIQUE: T1-weighted sagittal, T2, FLAIR, and diffusion axial, and T2 coronal coronal views of the 
brain are submitted.  

 

FINDINGS:

There is no evidence of acute ischemia.

 

There is mild generalized degenerative change. There is numerous focal areas of abnormal signal throu
ghout the white matter bilaterally. There is no mass effect.  

 

There is a soft tissue hematoma overlying the left frontal bone.

 

Craniocervical junction maintained. Sella turcica has a normal appearance.

Nasal septal deviation. Very mild changes of chronic sinusitis. Orbits are symmetric. No cerebellopon
juan angle mass.

 

 

IMPRESSION:

1. No acute intracranial process.

2. Numerous bilateral focal areas of abnormal signal white matter are nonspecific and can be associat
ed with hypertension, remote microvascular ischemic white matter disease or demyelinating correlate c
linically.

3. Left frontal soft tissue subcutaneous hematoma stable.

## 2023-08-18 LAB
ERYTHROCYTE [DISTWIDTH] IN BLOOD BY AUTOMATED COUNT: 2.79 M/UL (ref 3.8–5.4)
ERYTHROCYTE [DISTWIDTH] IN BLOOD: 15.4 % (ref 11.5–15.5)
HCT VFR BLD AUTO: 26.9 % (ref 34–46)
HGB BLD-MCNC: 9 GM/DL (ref 11.4–16)
MCH RBC QN AUTO: 32.5 PG (ref 25–35)
MCHC RBC AUTO-ENTMCNC: 33.6 G/DL (ref 31–37)
MCV RBC AUTO: 96.5 FL (ref 80–100)
PLATELET # BLD AUTO: 150 K/UL (ref 150–450)
WBC # BLD AUTO: 6.3 K/UL (ref 3.8–10.6)

## 2023-08-18 RX ADMIN — GABAPENTIN SCH MG: 100 CAPSULE ORAL at 15:35

## 2023-08-18 RX ADMIN — MONTELUKAST SODIUM SCH MG: 10 TABLET, FILM COATED ORAL at 08:35

## 2023-08-18 RX ADMIN — BUDESONIDE AND FORMOTEROL FUMARATE DIHYDRATE SCH PUFF: 160; 4.5 AEROSOL RESPIRATORY (INHALATION) at 20:47

## 2023-08-18 RX ADMIN — BUTALBITAL, ACETAMINOPHEN, AND CAFFEINE PRN EACH: 50; 325; 40 TABLET ORAL at 15:34

## 2023-08-18 RX ADMIN — DICYCLOMINE HYDROCHLORIDE SCH MG: 10 CAPSULE ORAL at 15:34

## 2023-08-18 RX ADMIN — BUDESONIDE AND FORMOTEROL FUMARATE DIHYDRATE SCH PUFF: 160; 4.5 AEROSOL RESPIRATORY (INHALATION) at 08:09

## 2023-08-18 RX ADMIN — SEVELAMER CARBONATE SCH MG: 800 TABLET, FILM COATED ORAL at 12:10

## 2023-08-18 RX ADMIN — SEVELAMER CARBONATE SCH MG: 800 TABLET, FILM COATED ORAL at 06:32

## 2023-08-18 RX ADMIN — FLUTICASONE PROPIONATE SCH SPRAY: 50 SPRAY, METERED NASAL at 08:35

## 2023-08-18 RX ADMIN — MECLIZINE HYDROCHLORIDE PRN MG: 25 TABLET ORAL at 08:43

## 2023-08-18 RX ADMIN — SEVELAMER CARBONATE SCH MG: 800 TABLET, FILM COATED ORAL at 15:34

## 2023-08-18 RX ADMIN — DICYCLOMINE HYDROCHLORIDE SCH MG: 10 CAPSULE ORAL at 08:35

## 2023-08-18 RX ADMIN — ISOSORBIDE MONONITRATE SCH MG: 30 TABLET, EXTENDED RELEASE ORAL at 12:10

## 2023-08-18 RX ADMIN — MECLIZINE HYDROCHLORIDE PRN MG: 25 TABLET ORAL at 22:03

## 2023-08-18 RX ADMIN — ONDANSETRON PRN MG: 4 TABLET, ORALLY DISINTEGRATING ORAL at 04:53

## 2023-08-18 RX ADMIN — DICYCLOMINE HYDROCHLORIDE SCH MG: 10 CAPSULE ORAL at 22:03

## 2023-08-18 RX ADMIN — ATORVASTATIN CALCIUM SCH MG: 20 TABLET, FILM COATED ORAL at 08:35

## 2023-08-18 RX ADMIN — GABAPENTIN SCH MG: 100 CAPSULE ORAL at 22:03

## 2023-08-18 RX ADMIN — BUTALBITAL, ACETAMINOPHEN, AND CAFFEINE PRN EACH: 50; 325; 40 TABLET ORAL at 08:42

## 2023-08-18 RX ADMIN — MECLIZINE HYDROCHLORIDE PRN MG: 25 TABLET ORAL at 15:34

## 2023-08-18 NOTE — P.PN
Subjective


Progress Note Date: 08/18/23


Principal diagnosis: 





Carotid stenosis





Patient seen and examined today as a follow-up.  We are asked to see patient for

asymptomatic carotid stenosis.  Patient was L any acute changes.  No new focal 

deficits.





Objective





- Vital Signs


Vital signs: 


                                   Vital Signs











Temp  97.4 F L  08/18/23 03:26


 


Pulse  71   08/18/23 03:26


 


Resp  20   08/18/23 03:26


 


BP  179/81   08/18/23 03:26


 


Pulse Ox  99   08/18/23 03:26


 


FiO2      








                                 Intake & Output











 08/17/23 08/18/23 08/18/23





 18:59 06:59 18:59


 


Intake Total 250 720 


 


Balance 250 720 


 


Weight 76.2 kg 76.4 kg 


 


Intake:   


 


  Oral 250 720 


 


Other:   


 


  Voiding Method Bedside Commode Bedside Commode 


 


  # Voids 1 1 


 


  # Bowel Movements  1 














- Exam








General appearance: The patient is alert, oriented, appears in no acute d

istress.


HET: Head is normocephalic and atraumatic.  Pupils are equal and reactive.  


Neck: Supple.


Heart: Regular.


Lungs: Equal expansion, normal respiratory effort.


Abdomen: Soft, nontender, nondistended.


Extremities: Normal skin color and turgor.  


Neurological: Alert and oriented 3.  Patient has generalized upper and lower 

extremity weakness.  Patient has no noted focal deficits.








- Labs


CBC & Chem 7: 


                                 08/14/23 09:50





                                 08/17/23 11:56


Labs: 


                  Abnormal Lab Results - Last 24 Hours (Table)











  08/17/23 08/17/23 Range/Units





  11:56 11:56 


 


Sodium   134 L  (137-145)  mmol/L


 


Potassium   3.4 L  (3.5-5.1)  mmol/L


 


Creatinine   3.04 H  (0.52-1.04)  mg/dL


 


Folate  40.00 H   (4.40-31.00)  ng/mL














Assessment and Plan


Assessment: 





1.  Asymptomatic bilateral carotid stenosis


2.  Vertigo


3.  Generalized Weakness


4.  Bradycardia


5.  Hypotensive events now resolved


6.  History of end-stage renal disease on hemodialysis


7.  COPD





Plan: 





1.  Continue symptomatic and supportive care


2.  Recommend CT angiogram head and neck, this can be done as an outpatient and 

patient prefers that


3.  Continue atorvastatin


4.  Recommend outpatient follow-up with vascular surgery for carotid 

surveillance


5.  No indication at this time for vascular surgical intervention


6.  Continue with recommendations from neurology


Thank you for this consultation, we will sign off at this time.





The impression and plan of care has been dictated as directed.





Dr. Funez


I performed a history and examination of this patient,  discussed the same with 

the dictator.  I agree with the dictator's note ,documented as a scribe.  Any 

additional findings or plans will be noted.

## 2023-08-18 NOTE — P.PN
Subjective


Progress Note Date: 08/18/23


Principal diagnosis: 





Renal failure, hyperkalemia.





This is a 63-year-old female patient with a known history of chronic obstructive

pulmonary disease, hypothyroidism, gastroesophageal reflux disease, CVA/TIA, 

congestive heart failure, end-stage renal disease on hemodialysis Monday Wednesday Friday.  She was on her way to dialysis but became altered and and a 

little distress and was brought to [facility] instead.  In the ER she was

found to have a pulse rate in the 20s low blood pressure and high potassium 

greater than 8.  She was placed on dopamine and given some atropine and 

eventually transcutaneously paced.  She was also treated with insulin, D50 on 

the bicarb and calcium.  She was transferred here to our ER for further 

evaluation and treatment.  White count 11.7.  Hemoglobin 10.4.  Platelets 173.  

Sodium 134.  Potassium 7.4.  Chloride 94.  Bicarb 29.  BUN 54.  Creatinine 7.11.

 Glucose 118.  Arterial blood gases on 100% nonrebreather mask revealed a PaO2 

of 129, pCO2 44 and a pH of 7.40.  She is seen today in consultation in the 

emergency department.  She is somewhat altered.  Restless. She is currently 

receiving urgent hemodialysis.  Remains on dopamine at 9 mcg/kg/m.  Heart rate 

in the 60s.  Blood pressure 101/53 with a mean of 69.  Saturations in the 90s.  

Chest x-ray reveals cardiomegaly but no acute cardiopulmonary process. 





The patient is seen today 08/15/2023 in follow-up in the emergency department.  

She is arousable.  She did receive hemodialysis yesterday with 2.6 L removed.  

Potassium had improved to 4.7.  Currently 5.6.  Sodium 135.  Bicarb 27.  BUN 28.

 Creatinine 4.08.  Chest x-ray shows cardiomegaly with chronic interstitial lung

disease.  Versus mild venous congestion.  She is continued on Symbicort, 

albuterol, Singulair.  She remains afebrile.  Hemodynamically stable.  On oxygen

at 2 L/m per nasal cannula.





Progress note dated 08/16/2023.





The patient is seen today in room 370.  She is currently undergoing hemodialysi

s.  She's not receiving any IV fluids.  She's currently on oxygen at 3 L by 

nasal cannula.  She appears to be much more awake and alert today than she was 

yesterday.  She was initially admitted from an outside hospital, for renal 

failure, and severe hyperkalemia.  Her mental status is been poor, but is much 

better today.  No new labs today as yet.  Labs from yesterday show sodium 135, 

potassium 5.6, chlorides 100, CO2 27, BUN 28, creatinine 4.08.  Brain CT showed 

no acute intracranial process.  She does have a left frontal soft tissue 

contusion.





Progress note dated 08/17/2023.





The patient is seen today in room 370.  She is currently not undergoing 

hemodialysis.  She continues on oxygen at 3 L.  No IV fluids.  Labs today 

include a sodium 134, potassium 3.4, chlorides 99, CO2 29, BUN 15, creatinine 

3.04.  TSH is normal.  The patient is a bit more awake today than she has been 

over the last few days.  Her mental status has improved, since she's had 

hemodialysis.  Brain MRI showed no acute intracranial process.





Progress note dated 08/18/2023.





63-year-old female who looks much older than her stated age, is seen today in 

room 370.  She is scheduled to have hemodialysis today.  The patient is 

currently on O2 at 2 L.  No IV fluids.  She was initially admitted with a 

diagnosis of hyperkalemia, and bradycardia.  No new labs today as yet.  Labs 

from yesterday include a sodium 134, potassium 3.4, chlorides 99, CO2 29, and a 

BUN and creatinine of 15 and 3.04 respectively.  Computed tomography scan of the

abdomen and pelvis shows small bilateral pleural effusions, bilateral kidneys, 

and 1.3 cm right ovarian cyst.





Objective





- Vital Signs


Vital signs: 


                                   Vital Signs











Temp  97.4 F L  08/18/23 03:26


 


Pulse  58 L  08/18/23 08:00


 


Resp  16   08/18/23 08:00


 


BP  167/76   08/18/23 08:00


 


Pulse Ox  94 L  08/18/23 08:00


 


FiO2      








                                 Intake & Output











 08/17/23 08/18/23 08/18/23





 18:59 06:59 18:59


 


Intake Total 250 720 240


 


Output Total   100


 


Balance 250 720 140


 


Weight 76.2 kg 76.4 kg 


 


Intake:   


 


  Oral 250 720 240


 


Output:   


 


  Urine   100


 


Other:   


 


  Voiding Method Bedside Commode Bedside Commode Bedside Commode


 


  # Voids 1 1 


 


  # Bowel Movements  1 1














- Exam





No acute distress, lethargic, but less so than the day before, and much more 

with it.  Currently, she is on 2 L of oxygen.





HEENT examination is grossly unremarkable. 





Neck supple.  Full range of motion.  No adenopathy thyromegaly or neck vein 

distention.





Cardiovascular examination reveals regular rhythm rate.  S1-S2 normal.  No S3 or

S4.  No discernible murmur noted.  Heart sounds are distant.  Heart rate 58 bpm.





Lungs reveal mostly clear breath sounds.  Scattered rhonchi are noted.  No 

crackles.  No wheezes.  Breath sounds are equal bilaterally.  2 L saturation is 

99%.





Abdomen soft bowel sounds are heard.  No masses or tenderness.





Extremities are intact.  No cyanosis clubbing or edema.





Skin is without rash or lesion.





Neurologic examination is improved.





- Labs


CBC & Chem 7: 


                                 08/14/23 09:50





                                 08/17/23 11:56


Labs: 


                  Abnormal Lab Results - Last 24 Hours (Table)











  08/17/23 08/17/23 Range/Units





  11:56 11:56 


 


Sodium   134 L  (137-145)  mmol/L


 


Potassium   3.4 L  (3.5-5.1)  mmol/L


 


Creatinine   3.04 H  (0.52-1.04)  mg/dL


 


Folate  40.00 H   (4.40-31.00)  ng/mL














Assessment and Plan


Assessment: 





Altered mental status secondary to acute on chronic renal failure.  Creatinine 

7.1, S/P hemodialysis on 08/14/2023 with 2.6 L removed.  





Severe bradycardia secondary to hyperkalemia requiring transcutaneous pacing and

dopamine infusion improved.





Hypotension secondary to above, resolved.





Hyperkalemia secondary to acute renal failure with potassium of 7.4. 

Postdialysis potassium improved to 4.7.  





Mild transaminitis.





History of CVA/TIA.





History of hypertension.





History of congestive heart failure.





History of COPD.





Former smoker.


Plan: 





Plan dated 08/16/2023.





The patient is currently undergoing hemodialysis.  The patient is much more 

awake and alert.  Labs, x-rays, and medications are reviewed.  We will continue 

to follow the patient and make recommendations along the way.  She was initially

admitted with worsening renal failure, and hyperkalemia, with bradycardia.  Most

of that has improved.  Prognosis is still guarded.





Plan dated 08/17/2023.





Currently, the patient appears to be doing reasonably well.  The patient is on a

couple liters of oxygen.  No IV fluids.  Her mental status has seen gradual 

improvement over the last few days.  Labs, x-rays, and medications are reviewed.

 No additional recommendations are made.  Patient overall prognosis remains 

guarded.  She does remain bradycardic.





Plan dated 08/18/2023.





The patient is seen today in room 370.  She is on 2 L.  Saturations are 

excellent.  She is having hemodialysis again today.  The patient is not 

receiving any IV fluids.  Her potassium is in the normal range.  Labs, x-rays, 

and medications are all reviewed.  Prognosis is guarded.  We will continue to 

follow the patient and make recommendations.


Time with Patient: Less than 30

## 2023-08-18 NOTE — CT
EXAMINATION TYPE: CT abdomen pelvis wo con

 

DATE OF EXAM: 8/17/2023

 

COMPARISON: None

 

INDICATION: n/v

 

DLP: 648.6 mGycm, Automated exposure control for dose reduction was used.

 

CONTRAST:  0 mL of Isovue 300. 

                        Study performed with Oral Contrast

 

TECHNIQUE: Axial images were obtained from above the diaphragm to the pubic rami in the axial plane a
t 5 mm thick sections.  Reconstructed images are reviewed on the computer in the coronal plane.  

 

FINDINGS:

 

Limited CT sections are obtained the lung bases.  Small bilateral pleural effusions are present. Some
 streak atelectasis at the bilateral lung bases, greater on the left..

 

CT ABDOMEN:

 

Liver: Normal

 

Spleen: Normal

 

Pancreas: Normal

 

Adrenal glands: The adrenal glands are normal.

 

Gallbladder: Normal  

 

Kidneys: No masses are evident. No hydronephrosis is present.   Cortical renal cysts on the posterior
 left kidney measuring 1.1 cm. Small cortical renal cyst is within the posterior superior right kidne
y. A larger exophytic cyst in the posterior lateral right mid kidney measuring 1.9 cm. Kidneys appear
 somewhat small and may be atrophic.  No renal stones are evident.

 

Aorta: Vascular calcification is within the aorta. 

 

Inferior vena cava: Normal.

 

CT PELVIS: 

Loops of bowel within the abdomen and pelvis are normal.     There are loops of bowel which are incom
pletely distended or lack oral contrast limiting their evaluation.

 

Appendix: Not identified. No dilated tubular structure or inflammatory changes evident.

 

Urinary bladder: Normal. 

 

Genitourinary structures: Uterus appears normal. Adnexa are within normal limits. There may be a smal
l 1.3 cm cyst on the right ovary. This could be followed with ultrasound.

 

Osseous structures: No suspicious lytic or sclerotic lesions. Generative disc changes present L5-S1. 
Appears be a hemangioma in the left L1 vertebral body.

 

IMPRESSIONS:

1.  Small bilateral pleural effusions with mild atelectasis.

2. Small bilateral kidneys. Correlate for renal insufficiency. Cortical renal cysts are present bilat
erally.

3. Small 1.3 cm right ovarian cyst. This can be followed with ultrasound.

## 2023-08-18 NOTE — P.PN
Subjective


Progress Note Date: 08/18/23


The patient continues to have diplopia.  








Objective





- Vital Signs


Vital signs: 


                                   Vital Signs











Temp  98 F   08/18/23 13:33


 


Pulse  73   08/18/23 15:38


 


Resp  16   08/18/23 15:38


 


BP  179/97   08/18/23 15:38


 


Pulse Ox  94 L  08/18/23 12:00


 


FiO2      








                                 Intake & Output











 08/17/23 08/18/23 08/18/23





 18:59 06:59 18:59


 


Intake Total 250 720 660


 


Output Total   2400


 


Balance 250 720 -1740


 


Weight 76.2 kg 76.4 kg 


 


Intake:   


 


  Oral 250 720 360


 


  Hemodialysis   300


 


Output:   


 


  Urine   100


 


  Hemodialysis   2300


 


Other:   


 


  Voiding Method Bedside Commode Bedside Commode Bedside Commode


 


  # Voids 1 1 


 


  # Bowel Movements  1 1














- Exam


GENERAL: The patient is lying in bed and is not in acute distress.


CARDIO: Edema in lowers.


INTEGUMENTARY: Has erythematous skin rash that is rectangual in shin region and 

states happens due to her CHF.





NEUROLOGICAL:


Higher mental function: The patient is awake, alert, oriented to self, place and

time.  Patient is following commands.  No aphasia and no neglect.


Cranial nerves: The pupils are round, equal and reactive to light.  Has moderate

ptosis of bilateral eye.  EOM is limited with lateral gaze (worse (right lateral

than left lateral gaze).  No restriction looking up and down.   rFacial 

sensation is normal to touch throughout.  No facial weakness.    Hearing is 

normal bilaterally to hand rub.  Tongue is midline and moved side-to-side 

without any difficulty.  No dysarthria is noted.  Shoulder shrug is normal 

bilaterally.


Motor: The strength is limited in assessment individual muscle strength since 

has generalized pain but lifting all extremities upon lifting extremities and 

appears symmetrical.    Normal tone and bulk.  


Cerebellum: Normal finger to nose n bilaterally.


Sensation: Sensation is normal to touch throughout.  


Reflexes (right/left): Refused to be assessed.


Plantars are mute bilaterally. 





Some of the workup during this hospital visit consisted of:


Heart rates upon presentation was in the 40s


Initial potassium is 7.4 that improved to 4.7.


Creatinine 7.11 improved to 4.08--3.04


Vital B12 is 1075


TSH is a 1.230


CK levels 113.


AST 71 ALT of 39.


TSH is 1.230


CT of the head is reported as no acute intracranial process.  Left frontal soft 

tissue contusion.  No evidence of fracture.  I personally reviewed the CT of 

head and I agree with the report.


MRI of the brain without is reported as no acute intracranial process.  Numerous

bilateral focal area of abnormal signal white matter are nonspecific and can be 

an associate with hypertension, remote microvascular ischemic white matter 

disease or demyelinating the correlate clinically.  Left frontal soft tissue 

subcutaneous hematoma stable.  I personally reviewed the MRI and I agree there 

is no acute or subacute ischemia.  Regarding the FLAIR white matter 

hyperintensities I feel they're not specific


Carotid duplex is reported as moderate approaching severe stenosis of right 

internal carotid artery approaching 69%.  Moderate narrowing between 50-69% left

ICA to carotid artery based on velocity.  Incidental note made on 1 cm nodule in

the left lobe thyroid.  Consider dedicated thyroid ultrasound workup.











- Labs


CBC & Chem 7: 


                                 08/18/23 13:25





                                 08/17/23 11:56


Labs: 


                  Abnormal Lab Results - Last 24 Hours (Table)











  08/18/23 Range/Units





  13:25 


 


RBC  2.79 L  (3.80-5.40)  m/uL


 


Hgb  9.0 L  (11.4-16.0)  gm/dL


 


Hct  26.9 L  (34.0-46.0)  %














Assessment and Plan


Assessment: 


This is a 63-year-old woman history of end-stage renal disease on dialysis, 

congestive heart failure who is having syncopal episode at home and she states 

the her episode happened upon her blood pressure dropping.  As a result she had 

a fall and hit her head.  She's complaining of the dizziness as well as was 

having vomiting and the vomiting is resolved.  He is having generalized 

weakness.  Upon presented to our hospital her systolic blood pressure was in the

80s.





Acute Vertigo: Seems due to hypotensive episodes and possible bradycardia.  On 

MRI there is no acute or subacute ischemia.  


Carotid stenosis is right ICA of close to 69% while left is between 50-69% on 

carotid duplex.  I doubt this is the culprit for the patient's vertigo.


Syncopal episodes likely due to her hypotensive episode.  EEG is negative for 

any seizure or any epileptiform discharges.


Patient has bilateral ptosis as well as restriction movement of eyes on the late

ral gaze more restriction look into the right and left and patient stated that 

she had I correction in the past I doubt this is a central cause and I think 

this is more ophthalmological related that seems old.  Today she does she not 

recall having surgery of eyes.


BradyCardia in 40's but per patient's nurse where as low as 20-30's.


Hypotensive episodes as well as systolic in the 80s


hyperkalenia as high as 7.4 --resolved


Generalized weakness


Congestive heart failure


End-stage renal disease on dialysis








Plan: 


Patient has bilateral ptosis as well as restriction movement of eyes on the 

lateral gaze more restriction look into the right and left and patient stated 

that she had I correction in the past I doubt this is a central cause and I 

think this is more ophthalmological related that seems old.  Acetylcholine 

receptor antibody is ordered and is pending.  Recommend the patient follow up 

with ophthalmologist and neurologist for further evaluation.


MRI the brain was negative for any acute or subacute ischemic stroke.


Regarding the bilateral carotid stenosis between 50-69% I feel they're 

asymptomatic but consulted vascular surgery team for further input.  They 

recommended symptomatic and supportive care, outpatient follow-up.


Pending orthostatic vitals


PT and OT are consulted


Patient was placed on midodrine 10 mg 1 tablet 3 times a day by primary team.  

Please avoid any further hypotensive episode.


We'll defer the rest of the medical management to the primary team and other 

specialists





Plan was discussed with the patient.


Dr. Silva will start neurology service tomorrow.





Time with Patient: Less than 30

## 2023-08-18 NOTE — P.PN
Subjective





This is a pleasant 63 years old female with multiple medical problems


Sent from [facility] to the emergency room for altered mental status. 

Patient was seen well in the emergency room, she was with altered mental status,

she does not respond to verbal or tactile stimuli.  Does not follow command.  

There is no asymmetry noted.  However examination is limited by patient mental 

status..


Also got 1 dose of IV Ativan in the emergency room which made her more sleepy


Patient is admitted on the stage renal disease on hemodialysis, she preferred 

with dialysis.  Apparently a very monday, wednesday and friday  


Admission her creatinine was 7.1, while at [facility] was 5.2, patient 

also has hyperkalemia with potassium 7.4 on admission.  Patient is to get urgent

hemodialysis in the emergency room.


Blood pressure 112/58, heart rate 85, patient is mildly tachypneic.  Marked 

leukocytosis with a 11.7.


Chest x-ray showed pulmonary venous congestion with scattered interstitial 

edema.  Repeat chest x-ray this facility showed cardiomegaly with no acute 

process.


 CT of the pressure with no acute intracranial process.


Patient also on dopamine drip.


Patient is in critical condition is going to be monitored the ICU currently.





08/15/2023 


patient is awake alert today, she is now she is an [facility] and she 

knows the daytime and date and the name of the president, she follows commands 

and has insight


She looks a little bit agitated and at bedtime today, she got bothered easily 

with frequent questioning.


She is complaining of from headache described by the patient as 10/10 all over, 

nonspecific in character associated with vomiting (patient has been vomiting for

the last 2-3 days as she describes) and dizziness.  She describes dizziness as a

room is spinning around her.


She denies any other specific symptoms to me.


Afebrile, vital signs stable


Laps improvement with potassium down to 5.6 and creatinine down to 4.0.  WBC 

11.7 and hemoglobin 11.4


Chest x-ray showing pulmonary venous congestion with vascular interstitial edema


Repeat chest x-ray is pending.  I reviewed myself with no significant change 

from yesterday 





08/16/2023


 awake and alert today somewhat lethargic and somnolent but she keeps her 

eye opening and she answers questions appropriately and follow commands.


She is going for another hemodialysis this morning.


She still complaining of from dizziness and she states that the room is [facility].  CT of the brain was negative for acute process.


This morning she was bradycardic with heart rate wasn't for these we lowered her

metoprolol 50 mg down to 12.5 mg with close monitoring.


Patient also not eating well.





08/17/2023


She does not look very fatigued or tired although she stays in bed most of the 

time.  She still complaining from vertigo and headache therefore one-time dose 

of Norco is tried as well as meclizine to assess any improvement.  MRI of the 

brain was negative for acute process (MRI of the brain showing no acute 

intracranial process but numerous bilateral focal areas of abnormal signal white

matter are nonspecific and can be associated with hypertension, remote microvasc

ular ischemic white matter disease or demyelinating)


She has some evidence of right carotid artery stenosis about 69%, vascular 

surgery recommended no inpatient surgical intervention but outpatient CTA of the

head and neck and the site of the case.


Echocardiogram showing ejection fraction of 50-55% and moderate mitral 

regurgitation however patient remains bradycardic and currently she is not on be

ta blocker (at home she was on 50 mg at bedtime which was discontinued) we will 

continue monitor tomorrow and if no improvement in heart rate then may consider 

further steps.  TSH is normal


Patient still able to eat well, she still complaining from nausea and vomiting, 

she complains from vague abdominal pain and generalized tenderness but abdomen 

soft and symptoms of mild, because she is still not able to eat we will order CT

of the abdomen and pelvis without contrast with close monitoring


Hemodialysis per Nephrology


                                        


08/18/2023





Patient initially came in with altered mental status, next a she woke up with 

headache and vertigo and been complaining for the last few days, today looks 

better, she did not complain from headache.


Besides MRI of the brain came back negative for acute process as above.


However patient started having complaining of from abdominal pain and she was 

not eating well last couple days, we'll obtain CT of the abdomen and pelvis 

which was negative for acute process, patient today she is able to eat well, she

still complaining of from some abdominal discomfort however she saw me this been

going on for months.  


Patient also reports black stool, or donuts check hemoglobin today.


She continue with hemodialysis


She has evidence of carotid artery stenosis however vascular surgery recommended

outpatient CTA and outpatient follow-up with vascular surgery team she was 

bradycardic with heart rate improved today 71,58 and 66








Objective





- Vital Signs


Vital signs: 


                                   Vital Signs











Temp  97.4 F L  08/18/23 03:26


 


Pulse  58 L  08/18/23 08:00


 


Resp  16   08/18/23 08:00


 


BP  167/76   08/18/23 08:00


 


Pulse Ox  94 L  08/18/23 08:00


 


FiO2      








                                 Intake & Output











 08/17/23 08/18/23 08/18/23





 18:59 06:59 18:59


 


Intake Total 250 720 240


 


Output Total   100


 


Balance 250 720 140


 


Weight 76.2 kg 76.4 kg 


 


Intake:   


 


  Oral 250 720 240


 


Output:   


 


  Urine   100


 


Other:   


 


  Voiding Method Bedside Commode Bedside Commode Bedside Commode


 


  # Voids 1 1 


 


  # Bowel Movements  1 1














- Exam





-GENERAL: The patient is alert and oriented x3, anxious not in any acute 

distress. Well developed, well nourished. 


HEENT: Pupils are round and equally reacting to light. EOMI. No scleral icterus.

No conjunctival pallor. Normocephalic, atraumatic. No pharyngeal erythema. No 

thyromegaly. 


CARDIOVASCULAR: S1 and S2 present. No murmurs, rubs, or gallops. 


PULMONARY: Chest is clear to auscultation, no wheezing , no crackles. 


ABDOMEN: Soft, nontender, nondistended, normoactive bowel sounds. No palpable 

organomegaly. 


MUSCULOSKELETAL: No joint swelling or deformity. 


EXTREMITIES: No cyanosis, clubbing, or pedal edema. 


NEUROLOGICAL: Gross neurological examination did not reveal any focal deficits. 


SKIN: No rashes. no petechiae.





- Labs


CBC & Chem 7: 


                                 08/14/23 09:50





                                 08/17/23 11:56


Labs: 


                  Abnormal Lab Results - Last 24 Hours (Table)











  08/17/23 08/17/23 Range/Units





  11:56 11:56 


 


Sodium   134 L  (137-145)  mmol/L


 


Potassium   3.4 L  (3.5-5.1)  mmol/L


 


Creatinine   3.04 H  (0.52-1.04)  mg/dL


 


Folate  40.00 H   (4.40-31.00)  ng/mL














Assessment and Plan


Assessment: 





Altered mental status most likely to metabolic/toxic encephalopathy, improving 


headache with vertigo, improving


Abdominal pain, chronic.  CT of the abdomen and pelvis was unremarkable for 

acute appendicitis.


End-stage renal disease(Monday Wednesday and Friday, presents with worsening 

creatinine and hyperkalemia


Mild leukocytosis


COPD, 


History of CVA/TIA


History of hypothyroidism





Plan: 





Monitored heart rate.  Metoprolol on hold


Neurology consult.  MRI of the brain is negative for acute process.  Symptomatic

treatment of vertigo


Continue with hemodialysis as per nephrology team on the case.  Monitor 

Creatinine and hyperkalemia improvement


Pulmonary/critical care team consult already on the case


Vascular surgery recommend outpatient CTA of the head and neck


Telemetry


Follow-up CT of the abdomen and pelvis


Labs and medication were reviewed..  Continue same treatment.  Continue with 

symptomatic treatment.  Resume home medication.  Monitor labs and vitals.  DVT 

and GI prophylaxis.  Further recommendations as per clinical course of the 

patient


DVT prophylaxis: Mechanical


GI Prophylaxis: Pepcid


Prognosis is guarded

## 2023-08-19 LAB
ANION GAP SERPL CALC-SCNC: 9 MMOL/L
BASOPHILS # BLD AUTO: 0 K/UL (ref 0–0.2)
BASOPHILS NFR BLD AUTO: 0 %
BUN SERPL-SCNC: 14 MG/DL (ref 7–17)
CALCIUM SPEC-MCNC: 8.8 MG/DL (ref 8.4–10.2)
CHLORIDE SERPL-SCNC: 95 MMOL/L (ref 98–107)
CO2 SERPL-SCNC: 29 MMOL/L (ref 22–30)
EOSINOPHIL # BLD AUTO: 0.3 K/UL (ref 0–0.7)
EOSINOPHIL NFR BLD AUTO: 5 %
ERYTHROCYTE [DISTWIDTH] IN BLOOD BY AUTOMATED COUNT: 3.15 M/UL (ref 3.8–5.4)
ERYTHROCYTE [DISTWIDTH] IN BLOOD: 15.2 % (ref 11.5–15.5)
GLUCOSE SERPL-MCNC: 114 MG/DL (ref 74–99)
HCT VFR BLD AUTO: 30.9 % (ref 34–46)
HGB BLD-MCNC: 10 GM/DL (ref 11.4–16)
LYMPHOCYTES # SPEC AUTO: 1 K/UL (ref 1–4.8)
LYMPHOCYTES NFR SPEC AUTO: 19 %
MCH RBC QN AUTO: 31.7 PG (ref 25–35)
MCHC RBC AUTO-ENTMCNC: 32.2 G/DL (ref 31–37)
MCV RBC AUTO: 98.2 FL (ref 80–100)
MONOCYTES # BLD AUTO: 0.4 K/UL (ref 0–1)
MONOCYTES NFR BLD AUTO: 8 %
NEUTROPHILS # BLD AUTO: 3.4 K/UL (ref 1.3–7.7)
NEUTROPHILS NFR BLD AUTO: 67 %
PLATELET # BLD AUTO: 166 K/UL (ref 150–450)
POTASSIUM SERPL-SCNC: 3.6 MMOL/L (ref 3.5–5.1)
SODIUM SERPL-SCNC: 133 MMOL/L (ref 137–145)
WBC # BLD AUTO: 5 K/UL (ref 3.8–10.6)

## 2023-08-19 RX ADMIN — ONDANSETRON PRN MG: 4 TABLET, ORALLY DISINTEGRATING ORAL at 11:08

## 2023-08-19 RX ADMIN — SEVELAMER CARBONATE SCH MG: 800 TABLET, FILM COATED ORAL at 12:11

## 2023-08-19 RX ADMIN — BUTALBITAL, ACETAMINOPHEN, AND CAFFEINE PRN EACH: 50; 325; 40 TABLET ORAL at 11:08

## 2023-08-19 RX ADMIN — ISOSORBIDE MONONITRATE SCH MG: 30 TABLET, EXTENDED RELEASE ORAL at 08:02

## 2023-08-19 RX ADMIN — BUTALBITAL, ACETAMINOPHEN, AND CAFFEINE PRN EACH: 50; 325; 40 TABLET ORAL at 04:53

## 2023-08-19 RX ADMIN — ONDANSETRON PRN MG: 4 TABLET, ORALLY DISINTEGRATING ORAL at 20:17

## 2023-08-19 RX ADMIN — ATORVASTATIN CALCIUM SCH MG: 20 TABLET, FILM COATED ORAL at 08:02

## 2023-08-19 RX ADMIN — BUTALBITAL, ACETAMINOPHEN, AND CAFFEINE PRN EACH: 50; 325; 40 TABLET ORAL at 20:16

## 2023-08-19 RX ADMIN — SEVELAMER CARBONATE SCH MG: 800 TABLET, FILM COATED ORAL at 06:50

## 2023-08-19 RX ADMIN — FLUTICASONE PROPIONATE SCH SPRAY: 50 SPRAY, METERED NASAL at 08:03

## 2023-08-19 RX ADMIN — BUDESONIDE AND FORMOTEROL FUMARATE DIHYDRATE SCH PUFF: 160; 4.5 AEROSOL RESPIRATORY (INHALATION) at 19:57

## 2023-08-19 RX ADMIN — DICYCLOMINE HYDROCHLORIDE SCH MG: 10 CAPSULE ORAL at 20:17

## 2023-08-19 RX ADMIN — GABAPENTIN SCH MG: 100 CAPSULE ORAL at 08:02

## 2023-08-19 RX ADMIN — MECLIZINE HYDROCHLORIDE PRN MG: 25 TABLET ORAL at 08:02

## 2023-08-19 RX ADMIN — BUDESONIDE AND FORMOTEROL FUMARATE DIHYDRATE SCH PUFF: 160; 4.5 AEROSOL RESPIRATORY (INHALATION) at 08:30

## 2023-08-19 RX ADMIN — MECLIZINE HYDROCHLORIDE PRN MG: 25 TABLET ORAL at 20:17

## 2023-08-19 RX ADMIN — DICYCLOMINE HYDROCHLORIDE SCH MG: 10 CAPSULE ORAL at 08:02

## 2023-08-19 RX ADMIN — SEVELAMER CARBONATE SCH MG: 800 TABLET, FILM COATED ORAL at 17:10

## 2023-08-19 RX ADMIN — ONDANSETRON PRN MG: 4 TABLET, ORALLY DISINTEGRATING ORAL at 02:16

## 2023-08-19 RX ADMIN — DICYCLOMINE HYDROCHLORIDE SCH MG: 10 CAPSULE ORAL at 15:14

## 2023-08-19 RX ADMIN — MONTELUKAST SODIUM SCH MG: 10 TABLET, FILM COATED ORAL at 08:02

## 2023-08-19 RX ADMIN — GABAPENTIN SCH MG: 100 CAPSULE ORAL at 20:18

## 2023-08-19 NOTE — P.GSCN
History of Present Illness


Consult date: 08/19/23


Reason for Consult: 





Anemia


History of present illness: 





This is a 63-year-old female who was


Hospital for multiple medical problems.  Patient noted to be anemic.  Her he 

will was 9.0.  Patient denies any obvious source of GI bleed.





Past Medical History


Past Medical History: Heart Failure, COPD, CVA/TIA, Dialysis, GERD/Reflux, 

Thyroid Disorder


History of Any Multi-Drug Resistant Organisms: Unobtainable


Additional Past Surgical History / Comment(s): Dialysis cath placement


Past Anesthesia/Blood Transfusion Reactions: No Reported Reaction


Past Psychological History: No Psychological Hx Reported


Smoking Status: Unknown if ever smoked


Past Alcohol Use History: Unable to Obtain


Past Drug Use History: Unable to Obtain





Medications and Allergies


                                Home Medications











 Medication  Instructions  Recorded  Confirmed  Type


 


Albuterol Nebulized [Ventolin 2.5 mg INHALATION RT-Q6H PRN 08/14/23 08/14/23 

History





Nebulized]    


 


Albuterol Sulfate [Albuterol 1 puff INHALATION RT-Q4H PRN 08/14/23 08/14/23 

History





Sulfate Hfa]    


 


Atorvastatin Calcium 20 mg PO DAILY 08/14/23 08/14/23 History


 


Azithromycin [Zithromax Z Pack] See Taper PO AS DIRECTED 08/14/23 08/14/23 

History


 


Benzonatate [Tessalon Perles] 100 - 200 mg PO TID PRN 08/14/23 08/14/23 History


 


Budesonide/Formoterol Fumarate 2 puff INHALATION RT-BID 08/14/23 08/14/23 

History





[Symbicort 160-4.5 Mcg Inhaler]    


 


Bumetanide [BUMEX] 4 mg PO TID 08/14/23 08/14/23 History


 


Dicyclomine [Bentyl] 10 mg PO TID 08/14/23 08/14/23 History


 


Fluticasone Nasal Spray [Flonase 2 spray EA NOSTRIL DAILY 08/14/23 08/14/23 

History





Nasal Spray]    


 


Gabapentin [Neurontin] 200 mg PO BID 08/14/23 08/14/23 History


 


Isosorbide Mononitrate ER [Imdur] 30 mg PO DAILY 08/14/23 08/14/23 History


 


Lidocaine-Prilocaine Cream [Emla 1 applic TOPICAL AS DIRECTED 08/14/23 08/14/23 

History





Cream 2.5%/2.5%]    


 


Loperamide [Imodium] 4 mg PO QID PRN 08/14/23 08/14/23 History


 


Metoprolol Succinate (ER) [Toprol 50 mg PO HS 08/14/23 08/15/23 History





Xl]    


 


Midodrine HCl 10 mg PO TID 08/14/23 08/14/23 History


 


Montelukast [Singulair] 10 mg PO DAILY 08/14/23 08/14/23 History


 


Omeprazole [PriLOSEC] 20 mg PO AC-SUPPER 08/14/23 08/14/23 History


 


Ondansetron Odt [Zofran Odt] 4 mg PO Q8HR PRN 08/14/23 08/14/23 History


 


SILVER sulfADIAZINE Cream 1 applic TOPICAL BID 08/14/23 08/14/23 History





[Silvadene 1% Cream]    


 


Sevelamer Carbonate 1,600 - 2,400 mg PO AS DIRECTED 08/14/23 08/14/23 History


 


amLODIPine [Norvasc] 10 mg PO DAILY 08/14/23 08/14/23 History


 


hydrALAZINE HCL 10 mg PO TID 08/14/23 08/14/23 History


 


lisinopriL [Zestril] 10 mg PO DAILY 08/14/23 08/14/23 History


 


Ascorbic Acid [Vitamin C] 500 mg PO DAILY 08/15/23 08/15/23 History


 


Aspirin EC [Ecotrin Low Dose] 81 mg PO DAILY 08/15/23 08/15/23 History


 


Bismuth Subsalicylate 525 - 1,050 mg PO TID PRN 08/15/23 08/15/23 History





[Pepto-Bismol Ultra]    


 


Cetirizine HCl 10 mg PO DAILY 08/15/23 08/15/23 History


 


Cholecalciferol (Vitamin D3) 125 mcg PO DAILY 08/15/23 08/15/23 History





[Vitamin D3]    


 


Cranberry 15,000 Mg Tab 15,000 mg PO DAILY 08/15/23 08/15/23 History


 


Magnesium 250 mg PO DAILY 08/15/23 08/15/23 History


 


Melatonin/Pyridoxine HCl (B6) 1 tab PO HS PRN 08/15/23 08/15/23 History





[Melatonin-Vit B6  5-10 mg Tab]    


 


Multivit-Min/FA/Lycopen/Lutein 1 tab PO DAILY 08/15/23 08/15/23 History





[Centrum Silver Tablet]    


 


Potassium Gluconate 99 mg PO DAILY 08/15/23 08/15/23 History


 


Propylene Glycol [Systane Complete] 1 drop BOTH EYES HS 08/15/23 08/15/23 

History


 


Super B Complex 1 tab PO DAILY 08/15/23 08/15/23 History


 


Vitamin E (Dl,Tocopheryl Acet) 400 unit PO DAILY 08/15/23 08/15/23 History





[Vitamin E (400 Iu = 180 mg)]    


 


Zinc Gluconate [Zinc] 50 mg PO DAILY 08/15/23 08/15/23 History








                                    Allergies











Allergy/AdvReac Type Severity Reaction Status Date / Time


 


erythromycin base Allergy  Unknown Verified 08/14/23 11:17


 


Penicillins Allergy  Unknown Verified 08/14/23 11:17


 


Sulfa (Sulfonamide Allergy  Unknown Verified 08/14/23 11:17





Antibiotics)     


 


sulfacetamide Allergy  Unknown Verified 08/14/23 11:17














Surgical - Exam


                                   Vital Signs











Pulse Resp BP Pulse Ox


 


 48 L  20   96/76   86 L


 


 08/14/23 09:44  08/14/23 09:44  08/14/23 09:44  08/14/23 09:44














- General


well developed, well nourished, no distress





- Eyes


PERRL





- ENT


normal pinna, normal nares





- Neck


no masses





- Respiratory


normal expansion





- Abdomen


Abdomen: soft, non tender





Results





- Labs





                                 08/18/23 13:25





                                 08/17/23 11:56


                  Abnormal Lab Results - Last 24 Hours (Table)











  08/18/23 Range/Units





  13:25 


 


RBC  2.79 L  (3.80-5.40)  m/uL


 


Hgb  9.0 L  (11.4-16.0)  gm/dL


 


Hct  26.9 L  (34.0-46.0)  %














Assessment and Plan


Assessment: 





Anemia.  Patient will undergo endoscopic evaluation.

## 2023-08-19 NOTE — P.PN
Subjective





Patient is seen for follow-up for end-stage renal disease.





Patient is seen on hemodialysis.  Tolerating treatment well.


CT of the head was negative for any acute process.  Soft tissue contusion on the

left side noted.





C/o headache.





Objective





- Vital Signs


Vital signs: 


                                   Vital Signs











Temp  98 F   08/18/23 13:33


 


Pulse  73   08/18/23 15:38


 


Resp  16   08/18/23 15:38


 


BP  179/97   08/18/23 15:38


 


Pulse Ox  94 L  08/18/23 12:00


 


FiO2      








                                 Intake & Output











 08/18/23 08/18/23 08/19/23





 06:59 18:59 06:59


 


Intake Total 720 660 


 


Output Total  2400 


 


Balance 720 -1740 


 


Weight 76.4 kg  


 


Intake:   


 


  Oral 720 360 


 


  Hemodialysis  300 


 


Output:   


 


  Urine  100 


 


  Hemodialysis  2300 


 


Other:   


 


  Voiding Method Bedside Commode Bedside Commode 


 


  # Voids 1 3 


 


  # Bowel Movements 1 1 














- Exam





Patient is awake, comfortable, no acute distress


Soft tissues swelling on the top of the left side of the head


Some bruising on the right side of the face noted which is improving from 2 

weeks ago.


Examination of the heart S1 and S2


Examination of the lungs bilateral breath sounds are heard


Abdomen is soft nontender


Examination lower extremity shows edema 2+ bilaterally with chronic skin changes


CNS exam grossly intact





- Labs


CBC & Chem 7: 


                                 08/18/23 13:25





                                 08/17/23 11:56


Labs: 


                  Abnormal Lab Results - Last 24 Hours (Table)











  08/18/23 Range/Units





  13:25 


 


RBC  2.79 L  (3.80-5.40)  m/uL


 


Hgb  9.0 L  (11.4-16.0)  gm/dL


 


Hct  26.9 L  (34.0-46.0)  %














Assessment and Plan


Assessment: 





1. ESRD, on HD on MWF schedule.


2. Severe hyperkalemia associated with ESRD.


3. Bradycardia secondary to hyperkalemia.  Improved. Metoprolol dose has been 

decreased.


4. Volume overload.


5. Mental status changes, r/o underlying infection/ sepsis.  Mentation currently

back to normal


6. Hypotension associated with bradycardia, r/o sepsis


7. H/o CVA


8. CKD mineral bone disorder.


9.  History of fall about 2 weeks ago with hematoma/ contusion on the top of the

head


Plan: 








No anticoagulation with hemodialysis


Continue off of betablockers.


Repeat labs in a.m.


Repeat hemodialysis in a.m.


Continue with phosphate binders

## 2023-08-19 NOTE — P.PN
Subjective





Patient is seen for follow-up for end-stage renal disease.





Patient is seen on hemodialysis.  Tolerating treatment well.


CT of the head was negative for any acute process.  Soft tissue contusion on the

left side noted.





C/o headache and left earache.


Scheduled for HD today.





Objective





- Vital Signs


Vital signs: 


                                   Vital Signs











Temp  98 F   08/18/23 13:33


 


Pulse  73   08/18/23 15:38


 


Resp  16   08/18/23 15:38


 


BP  179/97   08/18/23 15:38


 


Pulse Ox  94 L  08/18/23 12:00


 


FiO2      








                                 Intake & Output











 08/18/23 08/18/23 08/19/23





 06:59 18:59 06:59


 


Intake Total 720 660 


 


Output Total  2400 


 


Balance 720 -1740 


 


Weight 76.4 kg  


 


Intake:   


 


  Oral 720 360 


 


  Hemodialysis  300 


 


Output:   


 


  Urine  100 


 


  Hemodialysis  2300 


 


Other:   


 


  Voiding Method Bedside Commode Bedside Commode 


 


  # Voids 1 3 


 


  # Bowel Movements 1 1 














- Exam





Patient is awake, comfortable, no acute distress


Soft tissues swelling on the top of the left side of the head


Some bruising on the right side of the face noted which is improving from 2 

weeks ago.


Examination of the heart S1 and S2


Examination of the lungs bilateral breath sounds are heard


Abdomen is soft nontender


Examination lower extremity shows edema 2+ bilaterally with chronic skin changes


CNS exam grossly intact





- Labs


CBC & Chem 7: 


                                 08/18/23 13:25





                                 08/17/23 11:56


Labs: 


                  Abnormal Lab Results - Last 24 Hours (Table)











  08/18/23 Range/Units





  13:25 


 


RBC  2.79 L  (3.80-5.40)  m/uL


 


Hgb  9.0 L  (11.4-16.0)  gm/dL


 


Hct  26.9 L  (34.0-46.0)  %














Assessment and Plan


Assessment: 





1. ESRD, on HD on MWF schedule.


2. Severe hyperkalemia associated with ESRD.


3. Bradycardia secondary to hyperkalemia.  Improved. Metoprolol dose has been 

decreased.


4. Volume overload.


5. Mental status changes, r/o underlying infection/ sepsis.  Mentation currently

back to normal


6. Hypotension associated with bradycardia, r/o sepsis


7. H/o CVA


8. CKD mineral bone disorder.


9.  History of fall about 2 weeks ago with hematoma/ contusion on the top of the

head


Plan: 








HD today.


Continue off of betablockers.


Repeat labs in a.m.


UF about 2L today


Continue with phosphate binders

## 2023-08-19 NOTE — P.PN
Subjective


Progress Note Date: 08/19/23





63 years old female with multiple medical problems


Sent from [facility] to the emergency room for altered mental status. 

Patient was seen well in the emergency room, she was with altered mental status,

she does not respond to verbal or tactile stimuli.  Does not follow command.  

There is no asymmetry noted.  However examination is limited by patient mental 

status..


Also got 1 dose of IV Ativan in the emergency room which made her more sleepy


Patient is admitted on the stage renal disease on hemodialysis, she preferred 

with dialysis.  Apparently a very monday, wednesday and friday  


Admission her creatinine was 7.1, while at [facility] was 5.2, patient 

also has hyperkalemia with potassium 7.4 on admission.  Patient is to get urgent

hemodialysis in the emergency room.


Blood pressure 112/58, heart rate 85, patient is mildly tachypneic.  Marked 

leukocytosis with a 11.7.


Chest x-ray showed pulmonary venous congestion with scattered interstitial 

edema.  Repeat chest x-ray this facility showed cardiomegaly with no acute 

process.


 CT of the pressure with no acute intracranial process.


Patient also on dopamine drip.


Patient is in critical condition is going to be monitored the ICU currently.





08/15/2023 


patient is awake alert today, she is now she is an [facility] and she 

knows the daytime and date and the name of the president, she follows commands 

and has insight


She looks a little bit agitated and at bedtime today, she got bothered easily 

with frequent questioning.


She is complaining of from headache described by the patient as 10/10 all over, 

nonspecific in character associated with vomiting (patient has been vomiting for

the last 2-3 days as she describes) and dizziness.  She describes dizziness as a

room is spinning around her.


She denies any other specific symptoms to me.


Afebrile, vital signs stable


Laps improvement with potassium down to 5.6 and creatinine down to 4.0.  WBC 

11.7 and hemoglobin 11.4


Chest x-ray showing pulmonary venous congestion with vascular interstitial edema


Repeat chest x-ray is pending.  I reviewed myself with no significant change 

from yesterday 





08/16/2023


 awake and alert today somewhat lethargic and somnolent but she keeps her 

eye opening and she answers questions appropriately and follow commands.


She is going for another hemodialysis this morning.


She still complaining of from dizziness and she states that the room is 

spinning.  CT of the brain was negative for acute process.


This morning she was bradycardic with heart rate wasn't for these we lowered her

metoprolol 50 mg down to 12.5 mg with close monitoring.


Patient also not eating well.





08/17/2023


She does not look very fatigued or tired although she stays in bed most of the 

time.  She still complaining from vertigo and headache therefore one-time dose 

of Norco is tried as well as meclizine to assess any improvement.  MRI of the 

brain was negative for acute process (MRI of the brain showing no acute 

intracranial process but numerous bilateral focal areas of abnormal signal white

matter are nonspecific and can be associated with hypertension, remote 

microvascular ischemic white matter disease or demyelinating)


She has some evidence of right carotid artery stenosis about 69%, vascular 

surgery recommended no inpatient surgical intervention but outpatient CTA of the

head and neck and the site of the case.


Echocardiogram showing ejection fraction of 50-55% and moderate mitral 

regurgitation however patient remains bradycardic and currently she is not on 

beta blocker (at home she was on 50 mg at bedtime which was discontinued) we wi

ll continue monitor tomorrow and if no improvement in heart rate then may 

consider further steps.  TSH is normal


Patient still able to eat well, she still complaining from nausea and vomiting, 

she complains from vague abdominal pain and generalized tenderness but abdomen 

soft and symptoms of mild, because she is still not able to eat we will order CT

of the abdomen and pelvis without contrast with close monitoring


Hemodialysis per Nephrology


                                        


08/18/2023





Patient initially came in with altered mental status, next a she woke up with 

headache and vertigo and been complaining for the last few days, today looks 

better, she did not complain from headache.


Besides MRI of the brain came back negative for acute process as above.


However patient started having complaining of from abdominal pain and she was 

not eating well last couple days, we'll obtain CT of the abdomen and pelvis 

which was negative for acute process, patient today she is able to eat well, she

still complaining of from some abdominal discomfort however she saw me this been

going on for months.  


Patient also reports black stool, or donuts check hemoglobin today.


She continue with hemodialysis


She has evidence of carotid artery stenosis however vascular surgery recommended

outpatient CTA and outpatient follow-up with vascular surgery team she was 

bradycardic with heart rate improved today 71,58 and 66





08/19/2023  


Patient seen and evaluated bedside.  Patient is sleeping easily arousable goes b

ack to sleep.  Per nursing staff patient has been alert and oriented.  She did 

eat breakfast.  Blood work shows improved potassium levels.  Blood glucose 114. 

Stool C. diff negative





Objective





- Vital Signs


Vital signs: 


                                   Vital Signs











Temp  98.2 F   08/19/23 07:54


 


Pulse  64   08/19/23 13:24


 


Resp  16   08/19/23 11:06


 


BP  142/70   08/19/23 11:06


 


Pulse Ox  95   08/19/23 11:06


 


FiO2      








                                 Intake & Output











 08/18/23 08/19/23 08/19/23





 18:59 06:59 18:59


 


Intake Total 660  600


 


Output Total 2400  


 


Balance -1740  600


 


Weight  73.6 kg 


 


Intake:   


 


  Oral 360  600


 


  Hemodialysis 300  


 


Output:   


 


  Urine 100  


 


  Hemodialysis 2300  


 


Other:   


 


  Voiding Method Bedside Commode Bedside Commode Bedside Commode


 


  # Voids 3 1 


 


  # Bowel Movements 1  














- Exam








-GENERAL: The patient is sleeping, lethargic easily arousable. 


HEENT: Pupils are round and equally reacting to light.


CARDIOVASCULAR: S1 and S2 present. No murmurs, rubs, or gallops.  Dialysis 

catheter in place, lower extremity edema noted


PULMONARY: Chest is clear to auscultation, no wheezing , no crackles. 


ABDOMEN: Soft, nontender, nondistended, normoactive bowel sounds. No palpable 

organomegaly. 


MUSCULOSKELETAL: No joint swelling or deformity. 


NEUROLOGICAL: Logical exam limited








- Labs


CBC & Chem 7: 


                                 08/19/23 09:04





                                 08/19/23 09:04


Labs: 


                  Abnormal Lab Results - Last 24 Hours (Table)











  08/19/23 08/19/23 Range/Units





  09:04 09:04 


 


RBC   3.15 L  (3.80-5.40)  m/uL


 


Hgb   10.0 L  (11.4-16.0)  gm/dL


 


Hct   30.9 L  (34.0-46.0)  %


 


Sodium  133 L   (137-145)  mmol/L


 


Chloride  95 L   ()  mmol/L


 


Creatinine  2.85 H   (0.52-1.04)  mg/dL


 


Glucose  114 H   (74-99)  mg/dL














Assessment and Plan


Assessment: 





Assessment: 





Acute metabolic/toxic encephalopathy, improving 


Severe bradycardia secondary to electrolyte abnormalities requiring 

transcutaneous pacing and obesity.  Effusion


headache with vertigo, improving


Carotid artery stenosis


Abdominal pain, chronic.  CT of the abdomen and pelvis was unremarkable for 

acute appendicitis.


End-stage renal disease(Monday Wednesday and Friday, presents with worsening 

creatinine and hyperkalemia


COPD, 


History of CVA/TIA


History of hypothyroidism





Plan: 





* Consult obtained from vascular surgery, general surgery, nephrology, neurology


* Neurology consult.  MRI of the brain is negative for acute process.  

  Symptomatic treatment of vertigo


* Continue with hemodialysis as per nephrology team on the case.  Monitor 

  Creatinine and hyperkalemia improvement


* Pulmonary/critical care team consult already on the case


* Vascular surgery recommend outpatient CTA of the head and neck


* Continue to hold metoprolol 60 to bradycardia

## 2023-08-19 NOTE — P.PN
Subjective





Patient is seen in follow-up for her incisional disease.  She is maintained on 

hemodialysis on Monday Wednesday Friday schedule.  No problems with dialysis 

yesterday.  Sitting up in bed.  No active complaints.





Vital signs are stable.


General: No acute distress.


HEENT: Head exam is unremarkable. 


LUNGS: No audible rhonchi or wheezes.


HEART: Rate and Rhythm are regular.


ABDOMEN: Nontender.


EXTREMITITES: No edema.





Objective





- Vital Signs


Vital signs: 


                                   Vital Signs











Temp  98.2 F   08/19/23 07:54


 


Pulse  64   08/19/23 11:06


 


Resp  16   08/19/23 11:06


 


BP  142/70   08/19/23 11:06


 


Pulse Ox  95   08/19/23 11:06


 


FiO2      








                                 Intake & Output











 08/18/23 08/19/23 08/19/23





 18:59 06:59 18:59


 


Intake Total 660  360


 


Output Total 2400  


 


Balance -1740  360


 


Weight  73.6 kg 


 


Intake:   


 


  Oral 360  360


 


  Hemodialysis 300  


 


Output:   


 


  Urine 100  


 


  Hemodialysis 2300  


 


Other:   


 


  Voiding Method Bedside Commode Bedside Commode Bedside Commode


 


  # Voids 3 1 


 


  # Bowel Movements 1  














- Labs


CBC & Chem 7: 


                                 08/18/23 13:25





                                 08/17/23 11:56


Labs: 


                  Abnormal Lab Results - Last 24 Hours (Table)











  08/18/23 Range/Units





  13:25 


 


RBC  2.79 L  (3.80-5.40)  m/uL


 


Hgb  9.0 L  (11.4-16.0)  gm/dL


 


Hct  26.9 L  (34.0-46.0)  %














Assessment and Plan


Plan: 





Assessment:


1.  End-stage renal disease maintained on hemodialysis on Monday Wednesday Friday schedule.


2.  Severe hyperkalemia secondary to end-stage renal disease.  Improved 

postdialysis.


3.  Bradycardia secondary to hyperkalemia.  Metoprolol discontinued.  Improved.


4.  Volume overload.  Improving ultrafiltration.


5.  History of CVA.


6.  Chronic kidney disease mineral bone disease maintained on Renvela.


7.  Anemia of chronic kidney disease.  Rule out iron deficiency.  Also being 

seen by surgery with possible endoscopy this admission.





Plan:


Hemodialysis Monday.


Check iron studies.

## 2023-08-19 NOTE — P.PN
Subjective


Progress Note Date: 08/19/23


Principal diagnosis: 





Renal failure, hyperkalemia.





This is a 63-year-old female patient with a known history of chronic obstructive

pulmonary disease, hypothyroidism, gastroesophageal reflux disease, CVA/TIA, 

congestive heart failure, end-stage renal disease on hemodialysis Monday Wednesday Friday.  She was on her way to dialysis but became altered and and a 

little distress and was brought to [facility] instead.  In the ER she was

found to have a pulse rate in the 20s low blood pressure and high potassium 

greater than 8.  She was placed on dopamine and given some atropine and 

eventually transcutaneously paced.  She was also treated with insulin, D50 on 

the bicarb and calcium.  She was transferred here to our ER for further 

evaluation and treatment.  White count 11.7.  Hemoglobin 10.4.  Platelets 173.  

Sodium 134.  Potassium 7.4.  Chloride 94.  Bicarb 29.  BUN 54.  Creatinine 7.11.

 Glucose 118.  Arterial blood gases on 100% nonrebreather mask revealed a PaO2 

of 129, pCO2 44 and a pH of 7.40.  She is seen today in consultation in the 

emergency department.  She is somewhat altered.  Restless. She is currently 

receiving urgent hemodialysis.  Remains on dopamine at 9 mcg/kg/m.  Heart rate 

in the 60s.  Blood pressure 101/53 with a mean of 69.  Saturations in the 90s.  

Chest x-ray reveals cardiomegaly but no acute cardiopulmonary process. 





The patient is seen today 08/15/2023 in follow-up in the emergency department.  

She is arousable.  She did receive hemodialysis yesterday with 2.6 L removed.  

Potassium had improved to 4.7.  Currently 5.6.  Sodium 135.  Bicarb 27.  BUN 28.

 Creatinine 4.08.  Chest x-ray shows cardiomegaly with chronic interstitial lung

disease.  Versus mild venous congestion.  She is continued on Symbicort, 

albuterol, Singulair.  She remains afebrile.  Hemodynamically stable.  On oxygen

at 2 L/m per nasal cannula.





Progress note dated 08/16/2023.





The patient is seen today in room 370.  She is currently undergoing hemodialysi

s.  She's not receiving any IV fluids.  She's currently on oxygen at 3 L by 

nasal cannula.  She appears to be much more awake and alert today than she was 

yesterday.  She was initially admitted from an outside hospital, for renal 

failure, and severe hyperkalemia.  Her mental status is been poor, but is much 

better today.  No new labs today as yet.  Labs from yesterday show sodium 135, 

potassium 5.6, chlorides 100, CO2 27, BUN 28, creatinine 4.08.  Brain CT showed 

no acute intracranial process.  She does have a left frontal soft tissue 

contusion.





Progress note dated 08/17/2023.





The patient is seen today in room 370.  She is currently not undergoing 

hemodialysis.  She continues on oxygen at 3 L.  No IV fluids.  Labs today 

include a sodium 134, potassium 3.4, chlorides 99, CO2 29, BUN 15, creatinine 

3.04.  TSH is normal.  The patient is a bit more awake today than she has been 

over the last few days.  Her mental status has improved, since she's had 

hemodialysis.  Brain MRI showed no acute intracranial process.





Progress note dated 08/18/2023.





63-year-old female who looks much older than her stated age, is seen today in 

room 370.  She is scheduled to have hemodialysis today.  The patient is 

currently on O2 at 2 L.  No IV fluids.  She was initially admitted with a 

diagnosis of hyperkalemia, and bradycardia.  No new labs today as yet.  Labs 

from yesterday include a sodium 134, potassium 3.4, chlorides 99, CO2 29, and a 

BUN and creatinine of 15 and 3.04 respectively.  Computed tomography scan of the

abdomen and pelvis shows small bilateral pleural effusions, bilateral kidneys, 

and 1.3 cm right ovarian cyst.





Progress note dated 08/19/2023.





63-year-old female seen today in room 370.  She is resting comfortably.  She's 

on room air.  No IV fluids.  Her dialysis days are Monday/Wednesday/Friday.  

Clinically, her respiratory status is stable.  After today, we will see the pat

ient only as needed.  She has no complaints today, denies any shortness of 

breath, or difficulty breathing.  She was initially admitted with mental status 

changes, hyperkalemia, and bradycardia.  No new labs today.





Objective





- Vital Signs


Vital signs: 


                                   Vital Signs











Temp  98.2 F   08/19/23 07:54


 


Pulse  64   08/19/23 11:06


 


Resp  16   08/19/23 11:06


 


BP  142/70   08/19/23 11:06


 


Pulse Ox  95   08/19/23 11:06


 


FiO2      








                                 Intake & Output











 08/18/23 08/19/23 08/19/23





 18:59 06:59 18:59


 


Intake Total 660  360


 


Output Total 2400  


 


Balance -1740  360


 


Weight  73.6 kg 


 


Intake:   


 


  Oral 360  360


 


  Hemodialysis 300  


 


Output:   


 


  Urine 100  


 


  Hemodialysis 2300  


 


Other:   


 


  Voiding Method Bedside Commode Bedside Commode Bedside Commode


 


  # Voids 3 1 


 


  # Bowel Movements 1  














- Exam





No acute distress, lethargic, but less so than the day before, and much more 

with it.  Currently, on room air.





HEENT examination is grossly unremarkable. 





Neck supple.  Full range of motion.  No adenopathy thyromegaly or neck vein 

distention.





Cardiovascular examination reveals regular rhythm rate.  S1-S2 normal.  No S3 or

S4.  No discernible murmur noted.  Heart sounds are distant.  Heart rate 64 bpm.





Lungs reveal mostly clear breath sounds.  Scattered rhonchi are noted.  No 

crackles.  No wheezes.  Breath sounds are equal bilaterally.  Room air saturat

ion is 95%.





Abdomen soft bowel sounds are heard.  No masses or tenderness.





Extremities are intact.  No cyanosis clubbing or edema.





Skin is without rash or lesion.





Neurologic examination is improved.





- Labs


CBC & Chem 7: 


                                 08/18/23 13:25





                                 08/17/23 11:56


Labs: 


                  Abnormal Lab Results - Last 24 Hours (Table)











  08/18/23 Range/Units





  13:25 


 


RBC  2.79 L  (3.80-5.40)  m/uL


 


Hgb  9.0 L  (11.4-16.0)  gm/dL


 


Hct  26.9 L  (34.0-46.0)  %














Assessment and Plan


Assessment: 





Altered mental status secondary to acute on chronic renal failure.  Creatinine 

7.1, S/P hemodialysis on 08/14/2023 with 2.6 L removed.  





Severe bradycardia secondary to hyperkalemia requiring transcutaneous pacing and

dopamine infusion improved.





Hypotension secondary to above, resolved.





Hyperkalemia secondary to acute renal failure with potassium of 7.4. 

Postdialysis potassium improved to 4.7.  





Mild transaminitis.





History of CVA/TIA.





History of hypertension.





History of congestive heart failure.





History of COPD.





Former smoker.


Plan: 





Plan dated 08/16/2023.





The patient is currently undergoing hemodialysis.  The patient is much more 

awake and alert.  Labs, x-rays, and medications are reviewed.  We will continue 

to follow the patient and make recommendations along the way.  She was initially

admitted with worsening renal failure, and hyperkalemia, with bradycardia.  Most

of that has improved.  Prognosis is still guarded.





Plan dated 08/17/2023.





Currently, the patient appears to be doing reasonably well.  The patient is on a

couple liters of oxygen.  No IV fluids.  Her mental status has seen gradual 

improvement over the last few days.  Labs, x-rays, and medications are reviewed.

 No additional recommendations are made.  Patient overall prognosis remains 

guarded.  She does remain bradycardic.





Plan dated 08/18/2023.





The patient is seen today in room 370.  She is on 2 L.  Saturations are 

excellent.  She is having hemodialysis again today.  The patient is not 

receiving any IV fluids.  Her potassium is in the normal range.  Labs, x-rays, 

and medications are all reviewed.  Prognosis is guarded.  We will continue to 

follow the patient and make recommendations.





Plan dated 08/19/2023.





The patient is seen today in room 370.  Clinically, she is the best he's been 

since he been here in the hospital.  She's not receiving any IV fluids.  Labs, 

x-rays, medications are reviewed.  Her dialysis days are 

Monday/Wednesday/Friday.  She was initially admitted with mental status changes,

hyperkalemia, and bradycardia.  Prognosis is guarded.  Moving forward, we will 

see her as needed.


Time with Patient: Less than 30

## 2023-08-19 NOTE — P.PN
Subjective


Progress Note Date: 08/19/23








Patient was initially seen by Dr. Mauro Monae.  Please refer to his note for 

details.





Patient is a 63-year-old female with vertigo, ptosis of both eyes.  MRI of the 

brain is negative.  She had eye surgery in the past.  Patient has generalized 

weakness.  Patient states that every time she turns her head, gets worse.  When 

she is laying down, room spins.  She is complaining of sharp pain in the ears 

bilaterally, left more than right.  She states that she has history of ear 

infections in the past.





Telemetry monitoring showing sinus rhythm, with sinus bradycardia, some PVCs and

PACs.








Some of the workup during this hospital visit consisted of:


Heart rates upon presentation was in the 40s


Initial potassium is 7.4 that improved to 4.7.


Creatinine 7.11 improved to 4.08--3.04


Vital B12 is 1075


TSH is a 1.230


CK levels 113.


AST 71 ALT of 39.


TSH is 1.230


CT of the head is reported as no acute intracranial process.  Left frontal soft 

tissue contusion.  No evidence of fracture.  I personally reviewed the CT of 

head and I agree with the report.


MRI of the brain without is reported as no acute intracranial process.  Numerous

bilateral focal area of abnormal signal white matter are nonspecific and can be 

an associate with hypertension, remote microvascular ischemic white matter 

disease or demyelinating the correlate clinically.  Left frontal soft tissue 

subcutaneous hematoma stable.  I personally reviewed the MRI and I agree there 

is no acute or subacute ischemia.  Regarding the FLAIR white matter 

hyperintensities I feel they're not specific


Carotid duplex is reported as moderate approaching severe stenosis of right 

internal carotid artery approaching 69%.  Moderate narrowing between 50-69% left

ICA to carotid artery based on velocity.  Incidental note made on 1 cm nodule in

the left lobe thyroid.  Consider dedicated thyroid ultrasound workup.














Objective





- Vital Signs


Vital signs: 


                                   Vital Signs











Temp  98.4 F   08/19/23 15:11


 


Pulse  62   08/19/23 15:11


 


Resp  16   08/19/23 15:11


 


BP  163/77   08/19/23 15:11


 


Pulse Ox  99   08/19/23 15:11


 


FiO2      








                                 Intake & Output











 08/18/23 08/19/23 08/19/23





 18:59 06:59 18:59


 


Intake Total 660  600


 


Output Total 2400  


 


Balance -1740  600


 


Weight  73.6 kg 


 


Intake:   


 


  Oral 360  600


 


  Hemodialysis 300  


 


Output:   


 


  Urine 100  


 


  Hemodialysis 2300  


 


Other:   


 


  Voiding Method Bedside Commode Bedside Commode Bedside Commode


 


  # Voids 3 1 


 


  # Bowel Movements 1  














- Exam





Patient is alert and awake, in no distress.  Speech is completely clear with no 

aphasia or dysarthria.  She knows it is August and the year is 23 and that she 

is in [facility].





Cranial nerves are significant for significant Exopthalmos bilaterally.  Patient

appears to have some significant bilateral ophthalmoplegia.  Pupils are equal, 

round and reacting, face is symmetric.





On muscle strength testing, patient was giving extremely decreased effort.  Her 

deltoids were 3+4-, biceps 4 to 4-, triceps 4-, hip flexion 4-, ankle 

dorsiflexion 5-.  The strength was fluctuating it appears and was improving with

endurance and repeated instructions.  It took almost 10 attempts before patient 

was able to give effort of 5-for the ankles otherwise it was only 3+.





Reflexes are 1+ to 2+.  Sensations are equal.





- Labs


CBC & Chem 7: 


                                 08/22/23 08:00





                                 08/22/23 08:00


Labs: 


                  Abnormal Lab Results - Last 24 Hours (Table)











  08/19/23 08/19/23 Range/Units





  09:04 09:04 


 


RBC   3.15 L  (3.80-5.40)  m/uL


 


Hgb   10.0 L  (11.4-16.0)  gm/dL


 


Hct   30.9 L  (34.0-46.0)  %


 


Sodium  133 L   (137-145)  mmol/L


 


Chloride  95 L   ()  mmol/L


 


Creatinine  2.85 H   (0.52-1.04)  mg/dL


 


Glucose  114 H   (74-99)  mg/dL














Assessment and Plan


Assessment: 





This is a 63-year-old woman history of end-stage renal disease on dialysis, 

congestive heart failure who is having syncopal episode at home and she states 

the her episode happened upon her blood pressure dropping.  As a result she had 

a fall and hit her head.  She's complaining of the dizziness as well as was 

having vomiting and the vomiting is resolved.  He is having generalized 

weakness.  Upon presented to our hospital her systolic blood pressure was in the

80s.





Acute Vertigo: Seems due to hypotensive episodes and possible bradycardia.  On 

MRI there is no acute or subacute ischemia.  


Carotid stenosis is right ICA of close to 69% while left is between 50-69% on 

carotid duplex.  I doubt this is the culprit for the patient's vertigo.


Syncopal episodes likely due to her hypotensive episode.  EEG is negative for 

any seizure or any epileptiform discharges.


Patient has bilateral ptosis as well as restriction movement of eyes on the 

lateral gaze more restriction look into the right and left and patient stated 

that she had eye correction in the past, doubt this is a central cause and  

appears it is more ophthalmological related that seems old.  





BradyCardia in 40's but per patient's nurse where as low as 20-30's.


Hypotensive episodes as well as systolic in the 80s


hyperkalenia as high as 7.4 --resolved


Generalized weakness


Congestive heart failure


End-stage renal disease on dialysis








Plan: 








Patient has bilateral exopthalmos, ptosis as well as some ophthalmoparesis.   

Acetylcholine receptor antibody is ordered and is pending.  Recommend the 

patient follow up with ophthalmologist and neurologist for further evaluation.  

Thyroid functions are normal.


MRI the brain was negative for any acute or subacute ischemic stroke.


Regarding the bilateral carotid stenosis between 50-69%, vascular surgery has 

seen the patient, recommending symptomatic and supportive care.  Recommend CT 

angiogram of head and neck which can be done as outpatient.  Continue Lipitor.  

They recommended patient to follow up in the office for carotid surveillance.  

No indication at this time for vascular surgical intervention. 





Pending orthostatic vitals


PT and OT are consulted


Patient was placed on midodrine 10 mg 1 tablet 3 times a day by primary team.  

Please avoid any further hypotensive episode.


We'll defer the rest of the medical management to the primary team and other 

specialists

## 2023-08-20 LAB
ANION GAP SERPL CALC-SCNC: 11 MMOL/L
BUN SERPL-SCNC: 26 MG/DL (ref 7–17)
CALCIUM SPEC-MCNC: 8.8 MG/DL (ref 8.4–10.2)
CHLORIDE SERPL-SCNC: 94 MMOL/L (ref 98–107)
CO2 SERPL-SCNC: 26 MMOL/L (ref 22–30)
ERYTHROCYTE [DISTWIDTH] IN BLOOD BY AUTOMATED COUNT: 3.02 M/UL (ref 3.8–5.4)
ERYTHROCYTE [DISTWIDTH] IN BLOOD: 15.4 % (ref 11.5–15.5)
GLUCOSE SERPL-MCNC: 86 MG/DL (ref 74–99)
HCT VFR BLD AUTO: 29.4 % (ref 34–46)
HGB BLD-MCNC: 9.7 GM/DL (ref 11.4–16)
IRON SERPL-MCNC: 63 UG/DL (ref 50–170)
MAGNESIUM SPEC-SCNC: 2.3 MG/DL (ref 1.6–2.3)
MCH RBC QN AUTO: 32.1 PG (ref 25–35)
MCHC RBC AUTO-ENTMCNC: 33 G/DL (ref 31–37)
MCV RBC AUTO: 97.2 FL (ref 80–100)
PLATELET # BLD AUTO: 172 K/UL (ref 150–450)
POTASSIUM SERPL-SCNC: 4.8 MMOL/L (ref 3.5–5.1)
SODIUM SERPL-SCNC: 131 MMOL/L (ref 137–145)
TIBC SERPL-MCNC: 280 UG/DL (ref 228–460)
WBC # BLD AUTO: 4.9 K/UL (ref 3.8–10.6)

## 2023-08-20 RX ADMIN — SEVELAMER CARBONATE SCH MG: 800 TABLET, FILM COATED ORAL at 17:12

## 2023-08-20 RX ADMIN — SEVELAMER CARBONATE SCH MG: 800 TABLET, FILM COATED ORAL at 11:40

## 2023-08-20 RX ADMIN — GABAPENTIN SCH MG: 100 CAPSULE ORAL at 22:31

## 2023-08-20 RX ADMIN — BENZONATATE PRN MG: 100 CAPSULE ORAL at 15:10

## 2023-08-20 RX ADMIN — MONTELUKAST SODIUM SCH MG: 10 TABLET, FILM COATED ORAL at 07:55

## 2023-08-20 RX ADMIN — BENZONATATE PRN MG: 100 CAPSULE ORAL at 02:46

## 2023-08-20 RX ADMIN — ATORVASTATIN CALCIUM SCH MG: 20 TABLET, FILM COATED ORAL at 07:55

## 2023-08-20 RX ADMIN — BUDESONIDE AND FORMOTEROL FUMARATE DIHYDRATE SCH: 160; 4.5 AEROSOL RESPIRATORY (INHALATION) at 11:15

## 2023-08-20 RX ADMIN — SEVELAMER CARBONATE SCH MG: 800 TABLET, FILM COATED ORAL at 06:44

## 2023-08-20 RX ADMIN — BUTALBITAL, ACETAMINOPHEN, AND CAFFEINE PRN EACH: 50; 325; 40 TABLET ORAL at 11:40

## 2023-08-20 RX ADMIN — FLUTICASONE PROPIONATE SCH SPRAY: 50 SPRAY, METERED NASAL at 07:55

## 2023-08-20 RX ADMIN — MECLIZINE HYDROCHLORIDE PRN MG: 25 TABLET ORAL at 11:40

## 2023-08-20 RX ADMIN — ISOSORBIDE MONONITRATE SCH MG: 30 TABLET, EXTENDED RELEASE ORAL at 07:55

## 2023-08-20 RX ADMIN — BUDESONIDE AND FORMOTEROL FUMARATE DIHYDRATE SCH PUFF: 160; 4.5 AEROSOL RESPIRATORY (INHALATION) at 19:53

## 2023-08-20 RX ADMIN — DICYCLOMINE HYDROCHLORIDE SCH MG: 10 CAPSULE ORAL at 07:55

## 2023-08-20 RX ADMIN — GABAPENTIN SCH MG: 100 CAPSULE ORAL at 07:55

## 2023-08-20 RX ADMIN — DICYCLOMINE HYDROCHLORIDE SCH MG: 10 CAPSULE ORAL at 22:31

## 2023-08-20 RX ADMIN — DICYCLOMINE HYDROCHLORIDE SCH MG: 10 CAPSULE ORAL at 15:10

## 2023-08-20 RX ADMIN — ONDANSETRON PRN MG: 4 TABLET, ORALLY DISINTEGRATING ORAL at 11:40

## 2023-08-20 NOTE — P.PN
Subjective





Patient is seen in follow-up for end-stage renal She is maintained on 

hemodialysis on Monday Wednesday Friday schedule.  Sitting up in bed.  No active

complaints.  Scheduled for endoscopy tomorrow.





Vital signs are stable.


General: No acute distress.


HEENT: Head exam is unremarkable. 


LUNGS: No audible rhonchi or wheezes.


HEART: Rate and Rhythm are regular.


ABDOMEN: Nontender.


EXTREMITITES: Trace edema.





Objective





- Vital Signs


Vital signs: 


                                   Vital Signs











Temp  98.0 F   08/20/23 07:52


 


Pulse  71   08/20/23 07:52


 


Resp  16   08/20/23 07:52


 


BP  164/87   08/20/23 07:52


 


Pulse Ox  98   08/20/23 07:52


 


FiO2      








                                 Intake & Output











 08/19/23 08/20/23 08/20/23





 18:59 06:59 18:59


 


Intake Total 960  120


 


Balance 960  120


 


Weight  65.4 kg 


 


Intake:   


 


  Oral 960  120


 


Other:   


 


  Voiding Method Bedside Commode Bedside Commode Bedside Commode


 


  # Voids  1 1


 


  # Bowel Movements  1 














- Labs


CBC & Chem 7: 


                                 08/19/23 09:04





                                 08/19/23 09:04


Labs: 


                  Abnormal Lab Results - Last 24 Hours (Table)











  08/19/23 08/19/23 Range/Units





  09:04 09:04 


 


RBC   3.15 L  (3.80-5.40)  m/uL


 


Hgb   10.0 L  (11.4-16.0)  gm/dL


 


Hct   30.9 L  (34.0-46.0)  %


 


Sodium  133 L   (137-145)  mmol/L


 


Chloride  95 L   ()  mmol/L


 


Creatinine  2.85 H   (0.52-1.04)  mg/dL


 


Glucose  114 H   (74-99)  mg/dL


 


Transferrin  200.0 L   (204.0-354.0)  mg/dL


 


Ferritin  1809.0 H   (10.0-291.0)  ng/mL














Assessment and Plan


Plan: 





Assessment:


1.  End-stage renal disease maintained on hemodialysis on Monday Wednesday Friday schedule.


2.  Severe hyperkalemia secondary to end-stage renal disease.  Improved 

postdialysis.


3.  Bradycardia secondary to hyperkalemia.  Metoprolol discontinued.  Improved.


4.  Volume overload.  Improving ultrafiltration.


5.  History of CVA.


6.  Chronic kidney disease mineral bone disease maintained on Renvela.


7.  Anemia of chronic kidney disease.  High ferritin noted.  Endoscopy tomorrow.





Plan:


Hemodialysis Monday.


Add Aranesp.

## 2023-08-20 NOTE — P.PN
Progress Note - Text


Progress Note Date: 08/20/23





Patient is stable.  Her hemoglobin is 10.0.  She is currently doing her bowel 

prep.





On exam vital signs are stable.  Abdomen soft.





Patiently sent for EGD and colonoscopy in the a.m.

## 2023-08-20 NOTE — P.PN
Subjective


Progress Note Date: 08/20/23





63 years old female with multiple medical problems


Sent from [facility] to the emergency room for altered mental status. 

Patient was seen well in the emergency room, she was with altered mental status,

she does not respond to verbal or tactile stimuli.  Does not follow command.  

There is no asymmetry noted.  However examination is limited by patient mental 

status..


Also got 1 dose of IV Ativan in the emergency room which made her more sleepy


Patient is admitted on the stage renal disease on hemodialysis, she preferred 

with dialysis.  Apparently a very monday, wednesday and friday  


Admission her creatinine was 7.1, while at [facility] was 5.2, patient 

also has hyperkalemia with potassium 7.4 on admission.  Patient is to get urgent

hemodialysis in the emergency room.


Blood pressure 112/58, heart rate 85, patient is mildly tachypneic.  Marked 

leukocytosis with a 11.7.


Chest x-ray showed pulmonary venous congestion with scattered interstitial 

edema.  Repeat chest x-ray this facility showed cardiomegaly with no acute 

process.


 CT of the pressure with no acute intracranial process.


Patient also on dopamine drip.


Patient is in critical condition is going to be monitored the ICU currently.





08/15/2023 


patient is awake alert today, she is now she is an [facility] and she 

knows the daytime and date and the name of the president, she follows commands 

and has insight


She looks a little bit agitated and at bedtime today, she got bothered easily 

with frequent questioning.


She is complaining of from headache described by the patient as 10/10 all over, 

nonspecific in character associated with vomiting (patient has been vomiting for

the last 2-3 days as she describes) and dizziness.  She describes dizziness as a

room is spinning around her.


She denies any other specific symptoms to me.


Afebrile, vital signs stable


Laps improvement with potassium down to 5.6 and creatinine down to 4.0.  WBC 

11.7 and hemoglobin 11.4


Chest x-ray showing pulmonary venous congestion with vascular interstitial edema


Repeat chest x-ray is pending.  I reviewed myself with no significant change 

from yesterday 





08/16/2023


 awake and alert today somewhat lethargic and somnolent but she keeps her 

eye opening and she answers questions appropriately and follow commands.


She is going for another hemodialysis this morning.


She still complaining of from dizziness and she states that the room is 

spinning.  CT of the brain was negative for acute process.


This morning she was bradycardic with heart rate wasn't for these we lowered her

metoprolol 50 mg down to 12.5 mg with close monitoring.


Patient also not eating well.





08/17/2023


She does not look very fatigued or tired although she stays in bed most of the 

time.  She still complaining from vertigo and headache therefore one-time dose 

of Norco is tried as well as meclizine to assess any improvement.  MRI of the 

brain was negative for acute process (MRI of the brain showing no acute 

intracranial process but numerous bilateral focal areas of abnormal signal white

matter are nonspecific and can be associated with hypertension, remote 

microvascular ischemic white matter disease or demyelinating)


She has some evidence of right carotid artery stenosis about 69%, vascular 

surgery recommended no inpatient surgical intervention but outpatient CTA of the

head and neck and the site of the case.


Echocardiogram showing ejection fraction of 50-55% and moderate mitral 

regurgitation however patient remains bradycardic and currently she is not on 

beta blocker (at home she was on 50 mg at bedtime which was discontinued) we wi

ll continue monitor tomorrow and if no improvement in heart rate then may 

consider further steps.  TSH is normal


Patient still able to eat well, she still complaining from nausea and vomiting, 

she complains from vague abdominal pain and generalized tenderness but abdomen 

soft and symptoms of mild, because she is still not able to eat we will order CT

of the abdomen and pelvis without contrast with close monitoring


Hemodialysis per Nephrology


                                        


08/18/2023





Patient initially came in with altered mental status, next a she woke up with 

headache and vertigo and been complaining for the last few days, today looks 

better, she did not complain from headache.


Besides MRI of the brain came back negative for acute process as above.


However patient started having complaining of from abdominal pain and she was 

not eating well last couple days, we'll obtain CT of the abdomen and pelvis 

which was negative for acute process, patient today she is able to eat well, she

still complaining of from some abdominal discomfort however she saw me this been

going on for months.  


Patient also reports black stool, or donuts check hemoglobin today.


She continue with hemodialysis


She has evidence of carotid artery stenosis however vascular surgery recommended

outpatient CTA and outpatient follow-up with vascular surgery team she was 

bradycardic with heart rate improved today 71,58 and 66





08/19/2023  Patient seen and evaluated bedside.  Patient is sleeping easily 

arousable goes back to sleep.  Per nursing staff patient has been alert and 

oriented.  She did eat breakfast.  Blood work shows improved potassium levels.  

Blood glucose 114.  Stool C. diff negative


08/20/2023 patient seen and evaluated bedside.  Mentation has improved.  Patient

easily arousable.  Planning gastrointestinal workup general surgery following





Objective





- Vital Signs


Vital signs: 


                                   Vital Signs











Temp  98.0 F   08/20/23 07:52


 


Pulse  74   08/20/23 11:31


 


Resp  16   08/20/23 11:31


 


BP  174/86   08/20/23 11:31


 


Pulse Ox  97   08/20/23 11:31


 


FiO2      








                                 Intake & Output











 08/19/23 08/20/23 08/20/23





 18:59 06:59 18:59


 


Intake Total 960  120


 


Balance 960  120


 


Weight  65.4 kg 


 


Intake:   


 


  Oral 960  120


 


Other:   


 


  Voiding Method Bedside Commode Bedside Commode Bedside Commode


 


  # Voids  1 1


 


  # Bowel Movements  1 














- Exam








-GENERAL: The patient is sleeping, easily arousable does follow commands


HEENT: Pupils are round and equally reacting to light.


CARDIOVASCULAR: S1 and S2 present. No murmurs, rubs, or gallops.  Dialysis 

catheter in place, lower extremity edema noted


PULMONARY: Chest is clear to auscultation, no wheezing , no crackles. 


ABDOMEN: Soft, nontender, nondistended, normoactive bowel sounds. No palpable 

organomegaly. 


MUSCULOSKELETAL: No joint swelling or deformity. 


NEUROLOGICAL: Exam limited secondary to waxing and waning mentation








- Labs


CBC & Chem 7: 


                                 08/19/23 09:04





                                 08/19/23 09:04


Labs: 


                  Abnormal Lab Results - Last 24 Hours (Table)











  08/19/23 Range/Units





  09:04 


 


Transferrin  200.0 L  (204.0-354.0)  mg/dL


 


Ferritin  1809.0 H  (10.0-291.0)  ng/mL














Assessment and Plan


Assessment: 





Assessment: 





Acute metabolic/toxic encephalopathy, improving 


Severe bradycardia secondary to electrolyte abnormalities requiring 

transcutaneous pacing and obesity.  Effusion


headache with vertigo, improving


Carotid artery stenosis


Abdominal pain, chronic.  CT of the abdomen and pelvis was unremarkable for 

acute appendicitis.


End-stage renal disease(Monday Wednesday and Friday, presents with worsening 

creatinine and hyperkalemia


Bilateral ptosis


COPD, 


History of CVA/TIA


History of hypothyroidism





Plan: 





* Consult obtained from vascular surgery, general surgery, nephrology, neurology


* Neurology consult.  MRI of the brain is negative for acute process.  

  Symptomatic treatment of vertigo


* Continue with hemodialysis as per nephrology team on the case.  Monitor for 

  electrolyte abnormality


* In regards to bilateral ptosis illicit alcohol or receptor antibody ordered 

  and pending.  Patient seen by cardiology recommended ophthalmology follow up 

  outpatient


* Pulmonary/critical care team consult already on the case


* Vascular surgery recommend outpatient CTA of the head and neck


* Continue to hold metoprolol 60 to bradycardia

## 2023-08-21 LAB
ANION GAP SERPL CALC-SCNC: 8 MMOL/L
BUN SERPL-SCNC: 27 MG/DL (ref 7–17)
CALCIUM SPEC-MCNC: 9 MG/DL (ref 8.4–10.2)
CHLORIDE SERPL-SCNC: 97 MMOL/L (ref 98–107)
CO2 SERPL-SCNC: 25 MMOL/L (ref 22–30)
ERYTHROCYTE [DISTWIDTH] IN BLOOD BY AUTOMATED COUNT: 2.93 M/UL (ref 3.8–5.4)
ERYTHROCYTE [DISTWIDTH] IN BLOOD: 15.5 % (ref 11.5–15.5)
GLUCOSE SERPL-MCNC: 80 MG/DL (ref 74–99)
HCT VFR BLD AUTO: 28.4 % (ref 34–46)
HGB BLD-MCNC: 9.4 GM/DL (ref 11.4–16)
MCH RBC QN AUTO: 32.2 PG (ref 25–35)
MCHC RBC AUTO-ENTMCNC: 33.3 G/DL (ref 31–37)
MCV RBC AUTO: 96.8 FL (ref 80–100)
PLATELET # BLD AUTO: 139 K/UL (ref 150–450)
POTASSIUM SERPL-SCNC: 5.5 MMOL/L (ref 3.5–5.1)
SODIUM SERPL-SCNC: 130 MMOL/L (ref 137–145)
WBC # BLD AUTO: 4.4 K/UL (ref 3.8–10.6)

## 2023-08-21 PROCEDURE — 0DJD8ZZ INSPECTION OF LOWER INTESTINAL TRACT, VIA NATURAL OR ARTIFICIAL OPENING ENDOSCOPIC: ICD-10-PCS

## 2023-08-21 PROCEDURE — 0DB78ZX EXCISION OF STOMACH, PYLORUS, VIA NATURAL OR ARTIFICIAL OPENING ENDOSCOPIC, DIAGNOSTIC: ICD-10-PCS

## 2023-08-21 RX ADMIN — BUTALBITAL, ACETAMINOPHEN, AND CAFFEINE PRN EACH: 50; 325; 40 TABLET ORAL at 18:38

## 2023-08-21 RX ADMIN — GABAPENTIN SCH MG: 100 CAPSULE ORAL at 21:55

## 2023-08-21 RX ADMIN — BUDESONIDE AND FORMOTEROL FUMARATE DIHYDRATE SCH: 160; 4.5 AEROSOL RESPIRATORY (INHALATION) at 09:56

## 2023-08-21 RX ADMIN — ISOSORBIDE MONONITRATE SCH MG: 30 TABLET, EXTENDED RELEASE ORAL at 08:13

## 2023-08-21 RX ADMIN — CARBAMIDE PEROXIDE 6.5% SCH: 6.5 LIQUID AURICULAR (OTIC) at 06:13

## 2023-08-21 RX ADMIN — DICYCLOMINE HYDROCHLORIDE SCH MG: 10 CAPSULE ORAL at 08:12

## 2023-08-21 RX ADMIN — MONTELUKAST SODIUM SCH MG: 10 TABLET, FILM COATED ORAL at 08:12

## 2023-08-21 RX ADMIN — DICYCLOMINE HYDROCHLORIDE SCH MG: 10 CAPSULE ORAL at 21:56

## 2023-08-21 RX ADMIN — CARBAMIDE PEROXIDE 6.5% SCH DROPS: 6.5 LIQUID AURICULAR (OTIC) at 11:33

## 2023-08-21 RX ADMIN — CARBAMIDE PEROXIDE 6.5% SCH DROPS: 6.5 LIQUID AURICULAR (OTIC) at 21:56

## 2023-08-21 RX ADMIN — PANTOPRAZOLE SODIUM SCH MG: 40 TABLET, DELAYED RELEASE ORAL at 18:36

## 2023-08-21 RX ADMIN — DICYCLOMINE HYDROCHLORIDE SCH MG: 10 CAPSULE ORAL at 18:36

## 2023-08-21 RX ADMIN — FLUTICASONE PROPIONATE SCH SPRAY: 50 SPRAY, METERED NASAL at 11:33

## 2023-08-21 RX ADMIN — SEVELAMER CARBONATE SCH MG: 800 TABLET, FILM COATED ORAL at 18:37

## 2023-08-21 RX ADMIN — BUDESONIDE AND FORMOTEROL FUMARATE DIHYDRATE SCH PUFF: 160; 4.5 AEROSOL RESPIRATORY (INHALATION) at 20:00

## 2023-08-21 RX ADMIN — ATORVASTATIN CALCIUM SCH MG: 20 TABLET, FILM COATED ORAL at 08:12

## 2023-08-21 RX ADMIN — SEVELAMER CARBONATE SCH: 800 TABLET, FILM COATED ORAL at 06:39

## 2023-08-21 RX ADMIN — GABAPENTIN SCH MG: 100 CAPSULE ORAL at 11:33

## 2023-08-21 RX ADMIN — SEVELAMER CARBONATE SCH MG: 800 TABLET, FILM COATED ORAL at 11:36

## 2023-08-21 NOTE — P.PN
Progress Note - Text


Progress Note Date: 08/21/23


Hospital course:


63 years old female with multiple medical problems


Sent from [facility] to the emergency room for altered mental status. 

Patient was seen well in the emergency room, she was with altered mental status,

she does not respond to verbal or tactile stimuli.  Does not follow command.  

There is no asymmetry noted.  However examination is limited by patient mental 

status..


Also got 1 dose of IV Ativan in the emergency room which made her more sleepy


Patient is admitted on the stage renal disease on hemodialysis, she preferred 

with dialysis.  Apparently a very monday, wednesday and friday  


Admission her creatinine was 7.1, while at [facility] was 5.2, patient al

so has hyperkalemia with potassium 7.4 on admission.  Patient is to get urgent 

hemodialysis in the emergency room.


Blood pressure 112/58, heart rate 85, patient is mildly tachypneic.  Marked 

leukocytosis with a 11.7.


Chest x-ray showed pulmonary venous congestion with scattered interstitial 

edema.  Repeat chest x-ray this facility showed cardiomegaly with no acute 

process.


 CT of the pressure with no acute intracranial process.


Patient also on dopamine drip.


Patient is in critical condition is going to be monitored the ICU currently.





08/15/2023 


patient is awake alert today, she is now she is an [facility] and she 

knows the daytime and date and the name of the president, she follows commands 

and has insight


She looks a little bit agitated and at bedtime today, she got bothered easily 

with frequent questioning.


She is complaining of from headache described by the patient as 10/10 all over, 

nonspecific in character associated with vomiting (patient has been vomiting for

the last 2-3 days as she describes) and dizziness.  She describes dizziness as a

room is spinning around her.


She denies any other specific symptoms to me.


Afebrile, vital signs stable


Laps improvement with potassium down to 5.6 and creatinine down to 4.0.  WBC 

11.7 and hemoglobin 11.4


Chest x-ray showing pulmonary venous congestion with vascular interstitial edema


Repeat chest x-ray is pending.  I reviewed myself with no significant change 

from yesterday 





08/16/2023


 awake and alert today somewhat lethargic and somnolent but she keeps her 

eye opening and she answers questions appropriately and follow commands.


She is going for another hemodialysis this morning.


She still complaining of from dizziness and she states that the room is 

spinning.  CT of the brain was negative for acute process.


This morning she was bradycardic with heart rate wasn't for these we lowered her

metoprolol 50 mg down to 12.5 mg with close monitoring.


Patient also not eating well.





08/17/2023


She does not look very fatigued or tired although she stays in bed most of the 

time.  She still complaining from vertigo and headache therefore one-time dose 

of Norco is tried as well as meclizine to assess any improvement.  MRI of the 

brain was negative for acute process (MRI of the brain showing no acute 

intracranial process but numerous bilateral focal areas of abnormal signal white

matter are nonspecific and can be associated with hypertension, remote 

microvascular ischemic white matter disease or demyelinating)


She has some evidence of right carotid artery stenosis about 69%, vascular 

surgery recommended no inpatient surgical intervention but outpatient CTA of the

head and neck and the site of the case.


Echocardiogram showing ejection fraction of 50-55% and moderate mitral 

regurgitation however patient remains bradycardic and currently she is not on 

beta blocker (at home she was on 50 mg at bedtime which was discontinued) we 

will continue monitor tomorrow and if no improvement in heart rate then may 

consider further steps.  TSH is normal


Patient still able to eat well, she still complaining from nausea and vomiting, 

she complains from vague abdominal pain and generalized tenderness but abdomen 

soft and symptoms of mild, because she is still not able to eat we will order CT

of the abdomen and pelvis without contrast with close monitoring


Hemodialysis per Nephrology


                                        


08/18/2023





Patient initially came in with altered mental status, next a she woke up with 

headache and vertigo and been complaining for the last few days, today looks 

better, she did not complain from headache.


Besides MRI of the brain came back negative for acute process as above.


However patient started having complaining of from abdominal pain and she was 

not eating well last couple days, we'll obtain CT of the abdomen and pelvis 

which was negative for acute process, patient today she is able to eat well, she

still complaining of from some abdominal discomfort however she saw me this been

going on for months.  


Patient also reports black stool, or donuts check hemoglobin today.


She continue with hemodialysis


She has evidence of carotid artery stenosis however vascular surgery recommended

outpatient CTA and outpatient follow-up with vascular surgery team she was 

bradycardic with heart rate improved today 71,58 and 66





08/19/2023  Patient seen and evaluated bedside.  Patient is sleeping easily 

arousable goes back to sleep.  Per nursing staff patient has been alert and 

oriented.  She did eat breakfast.  Blood work shows improved potassium levels.  

Blood glucose 114.  Stool C. diff negative


08/20/2023 patient seen and evaluated bedside.  Mentation has improved.  Patient

easily arousable.  Planning gastrointestinal workup general surgery following





08/21/2023: I assumed care of the patient today.


Oral intake good.  Hemodialysis today.  Has chronic dry wounds on bilateral 

lower extremity.  More localized.  Underwent EGD colonoscopy by Dr. Baxter 

today.  Some gastritis.  Colonoscopy unremarkable.  No evidence of GI bleed.  9 

for patient to go to [facility].





Active Medications





Acetaminophen/Butalbital/Caffeine (Butalb/Apap/Caff -40mg Tab)  1 each PO 

Q4HR PRN


   PRN Reason: Headache


   Last Admin: 08/21/23 18:38 Dose:  1 each


   


Albuterol Sulfate (Albuterol Nebulized 2.5 Mg/3 Ml)  2.5 mg INHALATION RT-Q6H 

PRN


   PRN Reason: Shortness Of Breath


   Last Admin: 08/15/23 08:03 Dose:  2.5 mg


   


Atorvastatin Calcium (Atorvastatin 20 Mg Tab)  20 mg PO DAILY [facility]


   Last Admin: 08/21/23 08:12 Dose:  20 mg


   


Benzonatate (Benzonatate 100 Mg Cap)  200 mg PO TID PRN


   PRN Reason: Cough


   Last Admin: 08/20/23 15:10 Dose:  200 mg


   


Budesonide/Formoterol Fumarate (Symbicort 160-4.5 Mcg Inhaler)  2 puff 

INHALATION RT-BID [facility]


   Last Admin: 08/21/23 09:56 Dose:  Not Given


   


Carbamide Perox/Anhydrous Glycerin (Carbamide Peroxide 6.5% Drops 15 Ml Btl)  5 

drops BOTH EARS BID [facility]


   Last Admin: 08/21/23 11:33 Dose:  5 drops


   


Darbepoetin Jose Carlos (Darbepoetin Jose Carlos 40 Mcg/0.4 Ml Syringe)  40 mcg SQ Q7D [facility]


   Last Admin: 08/20/23 11:39 Dose:  40 mcg


   


Dicyclomine HCl (Dicyclomine 10 Mg Cap)  10 mg PO TID [facility]


   Last Admin: 08/21/23 18:36 Dose:  10 mg


   


Fluticasone Propionate (Fluticasone 50mcg/Spray Nasal 16gm)  2 spray EA NOSTRIL 

DAILY [facility]


   Last Admin: 08/21/23 11:33 Dose:  2 spray


   


Gabapentin (Gabapentin 100 Mg Cap)  200 mg PO BID [facility]


   Last Admin: 08/21/23 11:33 Dose:  200 mg


   


Guaifenesin (Guaifenesin 600 Mg Tablet.Er)  600 mg PO Q12HR [facility]


   Last Admin: 08/21/23 08:12 Dose:  600 mg


   


Hydralazine HCl (Hydralazine Hcl 50 Mg Tab)  50 mg PO TID [facility]


   Last Admin: 08/21/23 18:36 Dose:  50 mg


   


Isosorbide Mononitrate (Isosorbide Mononitrate Er 30 Mg Tab.Er.24h)  30 mg PO 

DAILY [facility]


   Last Admin: 08/21/23 08:13 Dose:  30 mg


   


Levofloxacin (Levofloxacin 250 Mg Tab)  250 mg PO Q48H [facility]


Meclizine HCl (Meclizine 25 Mg Tab)  25 mg PO QID PRN


   PRN Reason: Vertigo


   Last Admin: 08/20/23 11:40 Dose:  25 mg


   


Montelukast Sodium (Montelukast 10 Mg Tab)  10 mg PO DAILY [facility]


   Last Admin: 08/21/23 08:12 Dose:  10 mg


   


Naloxone HCl (Naloxone 0.4 Mg/Ml 1 Ml Vial)  0.2 mg IV Q2M PRN


   PRN Reason: Opioid Reversal


Ondansetron HCl (Ondansetron Odt 4 Mg Tab)  4 mg PO Q8HR PRN


   PRN Reason: Nausea


   Last Admin: 08/20/23 11:40 Dose:  4 mg


   


Pantoprazole Sodium (Pantoprazole 40 Mg Tablet)  40 mg PO AC-BRKFST [facility]


   Last Admin: 08/21/23 18:36 Dose:  40 mg


   


Sevelamer Carbonate (Sevelamer 800 Mg Tab)  2,400 mg PO TID-W/MEALS [facility]


   Last Admin: 08/21/23 18:37 Dose:  2,400 mg


   


Sevelamer Carbonate (Sevelamer 800 Mg Tab)  1,600 mg PO TID PRN


   PRN Reason: SNACKS





On examination:


VITAL SIGNS: [88.3, 71, 16, 171/90, 96% room air]


GENERAL APPEARANCE: Propped up in bed, eating lunch


HEENT: Normal external appearance of nose and ear.  Oral cavity normal


EYES: Pupils equal. Conjunctiva normal. 


NECK: JVD not raised. Mass not palpable. 


RESPIRATORY: Respiratory effort normal. Lungs clear to auscultation. 


CARDIOVASCULAR: First and second sounds normal. No edema. 


ABDOMEN: Soft. Liver and spleen not palpable. No tenderness. No mass palpable. 


PSYCHIATRY: Answering simple questions





INVESTIGATIONS, reviewed in the clinical context:





August 21: White count 4.4 hemoglobin 9.4 platelets 139 sodium 1:30 potassium 

5.5 BUN 27 creatinine 4.3 to


CT abdomen pelvis: Small bilateral kidneys.


Brain MRI: Numerous bilateral focal areas of abnormal signal white matters 

nonspecific.  Left frontal soft tissue subcutaneous hematoma stable.


2-D echocardiogram: EF 50-55%.  Moderate MR.


Carotid Doppler: Moderate approaching severe stenosis right internal carotid 

artery 69%.  50-69% left internal carotid artery.  Left thyroid nodule 1 cm.


EEG: No epileptiform activity.  Evidence of encephalopathy.








Assessment and plan: 





-Acute metabolic/toxic encephalopathy, improved





-Acute vertigo initially felt to be from hypotension





-Severe bradycardia secondary to electrolyte abnormalities/hyperkalemia 

requiring transcutaneous pacing and obesity.





-Chronic kidney disease minimal bone disease


On Renvela





-Anemia of chronic kidney disease.


EGD showed antral gastritis.  Colonoscopy normal.  On Aranesp





-Gastritis


PPI





-Carotid artery stenosis: Moderate approaching severe stenosis right internal 

carotid artery 69%.  50-69% left internal carotid artery. 


Follow-up with Dr. Funez vascular





-Abdominal pain, chronic.  CT of the abdomen and pelvis was unremarkable





-Severe hyperkalemia secondary to renal failure.


Improved with dialysis


.


-End-stage renal disease(Monday Wednesday and Friday,


Continue scheduled hemodialysis





-Bilateral ptosis as well as distraction movement of the eyes on lateral gaze.  

More so on the right.


Seen by neurology.  For outpatient follow-up with neurology and ophthalmology.





-COPD,


Symbicort.  Albuterol when necessary.  Singulair





Discussed with patient.  Hopefully discharge to [facility] tomorrow.

## 2023-08-21 NOTE — P.PN
Subjective


Progress Note Date: 08/21/23





CHIEF COMPLAINT: Anemia





HISTORY OF PRESENT ILLNESS: Patient status post EGD and colonoscopy that 

demonstrated antral gastritis and normal colonoscopy.  No evidence of active 

bleeding.  Presumed patient may have had bleeding from gastritis.  Hgb stable at

9.4





PHYSICAL EXAM: 


VITAL SIGNS: Reviewed.


ABDOMEN:  Soft.  








ASSESSMENT: 


1.  Gastritis


2.  Anemia








PLAN: 


-Continue to observe


-Continue diet


-Add Protonix daily





Physician Assistant note has been reviewed by physician. Signing provider agrees

with the documented findings, assessment, and plan of care. 





Objective





- Vital Signs


Vital signs: 


                                   Vital Signs











Temp  98.3 F   08/21/23 11:56


 


Pulse  71   08/21/23 11:56


 


Resp  16   08/21/23 11:56


 


BP  171/90   08/21/23 11:56


 


Pulse Ox  96   08/21/23 11:56


 


FiO2      








                                 Intake & Output











 08/20/23 08/21/23 08/21/23





 18:59 06:59 18:59


 


Intake Total 120  100


 


Balance 120  100


 


Weight  71.5 kg 


 


Intake:   


 


  IV   100


 


  Oral 120  


 


Other:   


 


  Voiding Method Bedside Commode Bedside Commode Bedside Commode


 


  # Voids 3  


 


  # Bowel Movements  6 














- Labs


CBC & Chem 7: 


                                 08/21/23 08:37





                                 08/21/23 08:37


Labs: 


                  Abnormal Lab Results - Last 24 Hours (Table)











  08/20/23 08/20/23 08/21/23 Range/Units





  15:44 15:44 08:37 


 


RBC  3.02 L   2.93 L  (3.80-5.40)  m/uL


 


Hgb  9.7 L   9.4 L  (11.4-16.0)  gm/dL


 


Hct  29.4 L   28.4 L  (34.0-46.0)  %


 


Plt Count    139 L  (150-450)  k/uL


 


Sodium   131 L   (137-145)  mmol/L


 


Potassium     (3.5-5.1)  mmol/L


 


Chloride   94 L   ()  mmol/L


 


BUN   26 H   (7-17)  mg/dL


 


Creatinine   4.12 H   (0.52-1.04)  mg/dL














  08/21/23 Range/Units





  08:37 


 


RBC   (3.80-5.40)  m/uL


 


Hgb   (11.4-16.0)  gm/dL


 


Hct   (34.0-46.0)  %


 


Plt Count   (150-450)  k/uL


 


Sodium  130 L  (137-145)  mmol/L


 


Potassium  5.5 H  (3.5-5.1)  mmol/L


 


Chloride  97 L  ()  mmol/L


 


BUN  27 H  (7-17)  mg/dL


 


Creatinine  4.32 H  (0.52-1.04)  mg/dL

## 2023-08-21 NOTE — P.PN
Subjective





Patient is seen in follow-up for end-stage renal She is maintained on 

hemodialysis on Monday Wednesday Friday schedule.  Resting in bed.  He underwent

EGD and colonoscopy this morning that showed antral gastritis.





Vital signs are stable.


General: No acute distress.


HEENT: Head exam is unremarkable. 


LUNGS: No audible rhonchi or wheezes.


HEART: Rate and Rhythm are regular.


ABDOMEN: Nontender.


EXTREMITITES: Trace edema.





Objective





- Vital Signs


Vital signs: 


                                   Vital Signs











Temp  98.3 F   08/21/23 11:56


 


Pulse  71   08/21/23 12:03


 


Resp  16   08/21/23 12:03


 


BP  171/90   08/21/23 11:56


 


Pulse Ox  96   08/21/23 11:56


 


FiO2      








                                 Intake & Output











 08/20/23 08/21/23 08/21/23





 18:59 06:59 18:59


 


Intake Total 120  280


 


Balance 120  280


 


Weight  71.5 kg 


 


Intake:   


 


  IV   100


 


  Oral 120  180


 


Other:   


 


  Voiding Method Bedside Commode Bedside Commode Bedside Commode


 


  # Voids 3  


 


  # Bowel Movements  6 1














- Labs


CBC & Chem 7: 


                                 08/21/23 08:37





                                 08/21/23 08:37


Labs: 


                  Abnormal Lab Results - Last 24 Hours (Table)











  08/20/23 08/20/23 08/21/23 Range/Units





  15:44 15:44 08:37 


 


RBC  3.02 L   2.93 L  (3.80-5.40)  m/uL


 


Hgb  9.7 L   9.4 L  (11.4-16.0)  gm/dL


 


Hct  29.4 L   28.4 L  (34.0-46.0)  %


 


Plt Count    139 L  (150-450)  k/uL


 


Sodium   131 L   (137-145)  mmol/L


 


Potassium     (3.5-5.1)  mmol/L


 


Chloride   94 L   ()  mmol/L


 


BUN   26 H   (7-17)  mg/dL


 


Creatinine   4.12 H   (0.52-1.04)  mg/dL














  08/21/23 Range/Units





  08:37 


 


RBC   (3.80-5.40)  m/uL


 


Hgb   (11.4-16.0)  gm/dL


 


Hct   (34.0-46.0)  %


 


Plt Count   (150-450)  k/uL


 


Sodium  130 L  (137-145)  mmol/L


 


Potassium  5.5 H  (3.5-5.1)  mmol/L


 


Chloride  97 L  ()  mmol/L


 


BUN  27 H  (7-17)  mg/dL


 


Creatinine  4.32 H  (0.52-1.04)  mg/dL














Assessment and Plan


Plan: 





Assessment:


1.  End-stage renal disease maintained on hemodialysis on Monday Wednesday Friday schedule.


2.  Severe hyperkalemia secondary to end-stage renal disease.  Improved 

postdialysis.


3.  Bradycardia secondary to hyperkalemia.  Metoprolol discontinued.  Improved.


4.  Volume overload.  Improving ultrafiltration.


5.  History of CVA.


6.  Chronic kidney disease mineral bone disease maintained on Renvela.


7.  Anemia of chronic kidney disease.  High ferritin noted.  EGD showed antral 

gastritis.  Colonoscopy normal.  On Aranesp.


8.  Hyponatremia secondary to chronic kidney disease.





Plan:


Hemodialysis today.


Check phosphorus level.

## 2023-08-21 NOTE — P.OP
Date of Procedure: 08/21/23


Preoperative Diagnosis: 


Anemia


Postoperative Diagnosis: 


Antral gastritis





Normal colon


Procedure(s) Performed: 


EGD





Colonoscopy


Anesthesia: MAC


Surgeon: Amaury Baxter


Pathology: other (Antrum)


Condition: stable


Disposition: PACU


Description of Procedure: 


The patient's placed on the endoscopy table in the lateral position.  She 

received IV sedation.  The gastroscope placed oropharynx passed in the esophagus

and stomach.  Scope was then placed through the pylorus.  First and second 

portion of the duodenum appeared normal.  Scope was then brought back the antrum

this was mildly inflamed.  A biopsies performed.  Scope was unretroflexed and 

remainder the stomach appeared normal.  The GE junction was at 47 is.  The 

distal esophagus appeared normal.  The proximal esophagus.  Scope withdrawn for 

patient.





Next digital rectal exam was performed.  This revealed no abnormalities.  

Flexible colonoscope was then placed patient anus and passed throughout the 

entire colon.  There is a large amount liquid stool the right colon which 

limited the view of the mucosa.  There was no abnormalities seen.  The 

visualized ascending colon, transverse colon appeared normal.  The descending 

and sigmoid colon appeared normal.  Scope back the rectum and this appeared 

normal.  Scope withdrawn for patient.





There is no evidence of any active GI bleed.  Resume the patient may have had 

bleeding from gastritis.

## 2023-08-22 VITALS
HEART RATE: 68 BPM | RESPIRATION RATE: 17 BRPM | DIASTOLIC BLOOD PRESSURE: 69 MMHG | TEMPERATURE: 98 F | SYSTOLIC BLOOD PRESSURE: 119 MMHG

## 2023-08-22 LAB
ANION GAP SERPL CALC-SCNC: 12 MMOL/L
BUN SERPL-SCNC: 17 MG/DL (ref 7–17)
CALCIUM SPEC-MCNC: 9.2 MG/DL (ref 8.4–10.2)
CHLORIDE SERPL-SCNC: 90 MMOL/L (ref 98–107)
CO2 SERPL-SCNC: 29 MMOL/L (ref 22–30)
ERYTHROCYTE [DISTWIDTH] IN BLOOD BY AUTOMATED COUNT: 3.27 M/UL (ref 3.8–5.4)
ERYTHROCYTE [DISTWIDTH] IN BLOOD: 15.6 % (ref 11.5–15.5)
GLUCOSE SERPL-MCNC: 113 MG/DL (ref 74–99)
HCT VFR BLD AUTO: 31.5 % (ref 34–46)
HGB BLD-MCNC: 10.3 GM/DL (ref 11.4–16)
MCH RBC QN AUTO: 31.5 PG (ref 25–35)
MCHC RBC AUTO-ENTMCNC: 32.7 G/DL (ref 31–37)
MCV RBC AUTO: 96.3 FL (ref 80–100)
PLATELET # BLD AUTO: 140 K/UL (ref 150–450)
POTASSIUM SERPL-SCNC: 4.2 MMOL/L (ref 3.5–5.1)
SODIUM SERPL-SCNC: 131 MMOL/L (ref 137–145)
WBC # BLD AUTO: 5 K/UL (ref 3.8–10.6)

## 2023-08-22 RX ADMIN — DICYCLOMINE HYDROCHLORIDE SCH MG: 10 CAPSULE ORAL at 15:00

## 2023-08-22 RX ADMIN — SEVELAMER CARBONATE SCH MG: 800 TABLET, FILM COATED ORAL at 11:28

## 2023-08-22 RX ADMIN — BENZONATATE PRN MG: 100 CAPSULE ORAL at 09:03

## 2023-08-22 RX ADMIN — FLUTICASONE PROPIONATE SCH SPRAY: 50 SPRAY, METERED NASAL at 09:04

## 2023-08-22 RX ADMIN — BUDESONIDE AND FORMOTEROL FUMARATE DIHYDRATE SCH PUFF: 160; 4.5 AEROSOL RESPIRATORY (INHALATION) at 08:06

## 2023-08-22 RX ADMIN — CARBAMIDE PEROXIDE 6.5% SCH DROPS: 6.5 LIQUID AURICULAR (OTIC) at 09:04

## 2023-08-22 RX ADMIN — SEVELAMER CARBONATE SCH MG: 800 TABLET, FILM COATED ORAL at 06:37

## 2023-08-22 RX ADMIN — DICYCLOMINE HYDROCHLORIDE SCH MG: 10 CAPSULE ORAL at 09:03

## 2023-08-22 RX ADMIN — BUTALBITAL, ACETAMINOPHEN, AND CAFFEINE PRN EACH: 50; 325; 40 TABLET ORAL at 09:03

## 2023-08-22 RX ADMIN — ONDANSETRON PRN MG: 4 TABLET, ORALLY DISINTEGRATING ORAL at 15:00

## 2023-08-22 RX ADMIN — ONDANSETRON PRN MG: 4 TABLET, ORALLY DISINTEGRATING ORAL at 00:25

## 2023-08-22 RX ADMIN — MONTELUKAST SODIUM SCH MG: 10 TABLET, FILM COATED ORAL at 09:03

## 2023-08-22 RX ADMIN — GABAPENTIN SCH MG: 100 CAPSULE ORAL at 09:03

## 2023-08-22 RX ADMIN — ISOSORBIDE MONONITRATE SCH MG: 30 TABLET, EXTENDED RELEASE ORAL at 09:03

## 2023-08-22 RX ADMIN — BUTALBITAL, ACETAMINOPHEN, AND CAFFEINE PRN EACH: 50; 325; 40 TABLET ORAL at 00:22

## 2023-08-22 RX ADMIN — ATORVASTATIN CALCIUM SCH MG: 20 TABLET, FILM COATED ORAL at 09:03

## 2023-08-22 RX ADMIN — BENZONATATE PRN MG: 100 CAPSULE ORAL at 00:22

## 2023-08-22 RX ADMIN — PANTOPRAZOLE SODIUM SCH MG: 40 TABLET, DELAYED RELEASE ORAL at 06:37

## 2023-08-22 NOTE — P.PN
Subjective


Progress Note Date: 08/22/23





CHIEF COMPLAINT: Anemia





HISTORY OF PRESENT ILLNESS: Patient status post EGD and colonoscopy that 

demonstrated antral gastritis and normal colonoscopy.  No evidence of active 

bleeding.  Presumed patient may have had bleeding from gastritis.  Patient 

tolerating diet.  She's had no further bleeding.  She denies any abdominal pain.

 She does report loose stools, which is chronic for her.  She is on Bentyl.  

Hemoglobin is up from 9.4-10.3





PHYSICAL EXAM: 


VITAL SIGNS: Reviewed.


ABDOMEN:  Soft.  








ASSESSMENT: 


1.  Gastritis


2.  Anemia status post EGD revealing antral gastritis








PLAN:


-Patient can be discharged surgical standpoint 


-Continue Protonix 40 mg daily





Physician Assistant note has been reviewed by physician. Signing provider agrees

with the documented findings, assessment, and plan of care. 





Objective





- Vital Signs


Vital signs: 


                                   Vital Signs











Temp  98 F   08/22/23 12:00


 


Pulse  68   08/22/23 12:00


 


Resp  17   08/22/23 12:00


 


BP  119/69   08/22/23 12:00


 


Pulse Ox  93 L  08/22/23 12:00


 


FiO2      








                                 Intake & Output











 08/21/23 08/22/23 08/22/23





 18:59 06:59 18:59


 


Intake Total 280 1000 240


 


Output Total  3000 


 


Balance 280 -2000 240


 


Weight  73.5 kg 


 


Intake:   


 


    


 


  Oral 180 500 240


 


  Hemodialysis  500 


 


Output:   


 


  Hemodialysis  3000 


 


Other:   


 


  Voiding Method Bedside Commode Bedside Commode 


 


  # Voids 2 1 1


 


  # Bowel Movements 1 1 














- Labs


CBC & Chem 7: 


                                 08/22/23 08:00





                                 08/22/23 08:00


Labs: 


                  Abnormal Lab Results - Last 24 Hours (Table)











  08/21/23 08/22/23 08/22/23 Range/Units





  08:37 08:00 08:00 


 


RBC    3.27 L  (3.80-5.40)  m/uL


 


Hgb    10.3 L  (11.4-16.0)  gm/dL


 


Hct    31.5 L  (34.0-46.0)  %


 


RDW    15.6 H  (11.5-15.5)  %


 


Plt Count    140 L  (150-450)  k/uL


 


Sodium   131 L   (137-145)  mmol/L


 


Chloride   90 L   ()  mmol/L


 


Creatinine   2.92 H   (0.52-1.04)  mg/dL


 


Glucose   113 H   (74-99)  mg/dL


 


Phosphorus  2.4 L    (2.5-4.5)  mg/dL

## 2023-08-22 NOTE — P.DS
Providers


Date of admission: 


08/14/23 11:32





Expected date of discharge: 08/22/23


Attending physician: 


Melecio Navarrete





Consults: 





                                        





08/14/23 11:32


Consult Physician Stat 


   Consulting Provider: Earl Monae


   Consult Reason/Comments: Hyperkalemia, altered mental status


   Do you want consulting provider notified?: Yes


Consult Physician Urgent 


   Consulting Provider: Sweetie Bone


   Consult Reason/Comments: Renal failure


   Do you want consulting provider notified?: Yes





08/15/23 11:53


Consult Physician Routine 


   Consulting Provider: Mauro Monae


   Consult Reason/Comments: vertigo and headache/vomiting


   Do you want consulting provider notified?: Yes





08/17/23 10:46


Consult Physician Routine 


   Consulting Provider: Maria De Jesus Funez


   Consult Reason/Comments: carotid stenosis


   Do you want consulting provider notified?: Yes





08/18/23 21:53


Consult Physician Routine 


   Consulting Provider: Amaury Baxter


   Consult Reason/Comments: anemia


   Do you want consulting provider notified?: Yes, Notify in am











Primary care physician: 


Stated None





Hospital Course: 





Hospital course:


63 years old female with multiple medical problems


Sent from [facility] to the emergency room for altered mental status. 

Patient was seen well in the emergency room, she was with altered mental status,

she does not respond to verbal or tactile stimuli.  Does not follow command.  

There is no asymmetry noted.  However examination is limited by patient mental 

status..


Also got 1 dose of IV Ativan in the emergency room which made her more sleepy


Patient is admitted on the stage renal disease on hemodialysis, she preferred 

with dialysis.  Apparently a very monday, wednesday and friday  


Admission her creatinine was 7.1, while at [facility] was 5.2, patient 

also has hyperkalemia with potassium 7.4 on admission.  Patient is to get urgent

hemodialysis in the emergency room.


Blood pressure 112/58, heart rate 85, patient is mildly tachypneic.  Marked 

leukocytosis with a 11.7.


Chest x-ray showed pulmonary venous congestion with scattered interstitial 

edema.  Repeat chest x-ray this facility showed cardiomegaly with no acute 

process.


 CT of the pressure with no acute intracranial process.


Patient also on dopamine drip.


Patient is in critical condition is going to be monitored the ICU currently.





08/15/2023 


patient is awake alert today, she is now she is an [facility] and she 

knows the daytime and date and the name of the president, she follows commands 

and has insight


She looks a little bit agitated and at bedtime today, she got bothered easily 

with frequent questioning.


She is complaining of from headache described by the patient as 10/10 all over, 

nonspecific in character associated with vomiting (patient has been vomiting for

the last 2-3 days as she describes) and dizziness.  She describes dizziness as a

room is spinning around her.


She denies any other specific symptoms to me.


Afebrile, vital signs stable


Laps improvement with potassium down to 5.6 and creatinine down to 4.0.  WBC 

11.7 and hemoglobin 11.4


Chest x-ray showing pulmonary venous congestion with vascular interstitial edema


Repeat chest x-ray is pending.  I reviewed myself with no significant change 

from yesterday 





08/16/2023


 awake and alert today somewhat lethargic and somnolent but she keeps her 

eye opening and she answers questions appropriately and follow commands.


She is going for another hemodialysis this morning.


She still complaining of from dizziness and she states that the room is 

spinning.  CT of the brain was negative for acute process.


This morning she was bradycardic with heart rate wasn't for these we lowered her

metoprolol 50 mg down to 12.5 mg with close monitoring.


Patient also not eating well.





08/17/2023


She does not look very fatigued or tired although she stays in bed most of the 

time.  She still complaining from vertigo and headache therefore one-time dose 

of Norco is tried as well as meclizine to assess any improvement.  MRI of the 

brain was negative for acute process (MRI of the brain showing no acute 

intracranial process but numerous bilateral focal areas of abnormal signal white

matter are nonspecific and can be associated with hypertension, remote micro

vascular ischemic white matter disease or demyelinating)


She has some evidence of right carotid artery stenosis about 69%, vascular 

surgery recommended no inpatient surgical intervention but outpatient CTA of the

head and neck and the site of the case.


Echocardiogram showing ejection fraction of 50-55% and moderate mitral 

regurgitation however patient remains bradycardic and currently she is not on 

beta blocker (at home she was on 50 mg at bedtime which was discontinued) we 

will continue monitor tomorrow and if no improvement in heart rate then may 

consider further steps.  TSH is normal


Patient still able to eat well, she still complaining from nausea and vomiting, 

she complains from vague abdominal pain and generalized tenderness but abdomen 

soft and symptoms of mild, because she is still not able to eat we will order CT

of the abdomen and pelvis without contrast with close monitoring


Hemodialysis per Nephrology


                                        


08/18/2023





Patient initially came in with altered mental status, next a she woke up with 

headache and vertigo and been complaining for the last few days, today looks 

better, she did not complain from headache.


Besides MRI of the brain came back negative for acute process as above.


However patient started having complaining of from abdominal pain and she was 

not eating well last couple days, we'll obtain CT of the abdomen and pelvis 

which was negative for acute process, patient today she is able to eat well, she

still complaining of from some abdominal discomfort however she saw me this been

going on for months.  


Patient also reports black stool, or donuts check hemoglobin today.


She continue with hemodialysis


She has evidence of carotid artery stenosis however vascular surgery recommended

outpatient CTA and outpatient follow-up with vascular surgery team she was 

bradycardic with heart rate improved today 71,58 and 66





08/19/2023  Patient seen and evaluated bedside.  Patient is sleeping easily 

arousable goes back to sleep.  Per nursing staff patient has been alert and 

oriented.  She did eat breakfast.  Blood work shows improved potassium levels.  

Blood glucose 114.  Stool C. diff negative


08/20/2023 patient seen and evaluated bedside.  Mentation has improved.  Patient

easily arousable.  Planning gastrointestinal workup general surgery following





08/21/2023: I assumed care of the patient today.


Oral intake good.  Hemodialysis today.  Has chronic dry wounds on bilateral 

lower extremity.  More localized.  Underwent EGD colonoscopy by Dr. Baxter 

today.  Some gastritis.  Colonoscopy unremarkable.  No evidence of GI bleed.  9 

for patient to go to [facility] bed.


August 22 23: Comfortable.  Eating well.  Medications reviewed.  Discussed with 

patient.  Discussed with nurse and .  Discharged to F.  Questions

answered


Discussion and discharge planning more than 35 minutes








On examination:


VITAL SIGNS: 98, 68, 17, 11 9 x 69, 93% room air


GENERAL APPEARANCE: Propped up in bed, eating 


HEENT: Normal external appearance of nose and ear.  Oral cavity normal


EYES: Pupils equal. Conjunctiva normal. 


NECK: JVD not raised. Mass not palpable. 


RESPIRATORY: Respiratory effort normal. Lungs clear to auscultation. 


CARDIOVASCULAR: First and second sounds normal. No edema. 


ABDOMEN: Soft. Liver and spleen not palpable. No tenderness. No mass palpable. 


PSYCHIATRY: Answering simple questions





INVESTIGATIONS, reviewed in the clinical context:


August 22: White count 5 units globin 10.3 potassium 4.2 crit and 2.9 to


August 21: White count 4.4 hemoglobin 9.4 platelets 139 sodium 1:30 potassium 

5.5 BUN 27 creatinine 4.3 to


CT abdomen pelvis: Small bilateral kidneys.


Brain MRI: Numerous bilateral focal areas of abnormal signal white matters 

nonspecific.  Left frontal soft tissue subcutaneous hematoma stable.


2-D echocardiogram: EF 50-55%.  Moderate MR.


Carotid Doppler: Moderate approaching severe stenosis right internal carotid 

artery 69%.  50-69% left internal carotid artery.  Left thyroid nodule 1 cm.


EEG: No epileptiform activity.  Evidence of encephalopathy.


Transferrin 200 ferritin 01/08/2009 TIBC 28T percent saturation 22.5 iron 57








Assessment and plan: 





-Acute metabolic/toxic encephalopathy, improved





-Acute vertigo initially felt to be from hypotension





-Severe bradycardia secondary to electrolyte abnormalities/hyperkalemia 

requiring transcutaneous pacing 





-Chronic kidney disease minimal bone disease


On Renvela





-Anemia of chronic kidney disease.


EGD showed antral gastritis.  Colonoscopy normal.  On Aranesp





-Gastritis


PPI





-Carotid artery stenosis: Moderate approaching severe stenosis right internal 

carotid artery 69%.  50-69% left internal carotid artery. 


Follow-up with Dr. Funez vascular





-Abdominal pain, chronic.  CT of the abdomen and pelvis was unremarkable


Eating well.





-Severe hyperkalemia secondary to renal failure.


Improved with dialysis


.


-End-stage renal disease(Monday Wednesday and Friday,


Continue scheduled hemodialysis





-Bilateral ptosis as well as distraction movement of the eyes on lateral gaze.  

More so on the right.


Seen by neurology.  For outpatient follow-up with neurology and ophthalmology.





-COPD,


Symbicort.  Albuterol when necessary.  Singulair





-Full code





Disposition:


Rehab at [facility], [place].


 








Plan - Discharge Summary


Discharge Rx Participant: No


New Discharge Prescriptions: 


New


   hydrALAZINE HCL [Apresoline] 50 mg PO TID  tab





Continue


   SILVER sulfADIAZINE Cream [Silvadene 1% Cream] 1 applic TOPICAL BID


   Montelukast [Singulair] 10 mg PO DAILY


   Dicyclomine [Bentyl] 10 mg PO TID


   Atorvastatin Calcium 20 mg PO DAILY


   Fluticasone Nasal Spray [Flonase Nasal Spray] 2 spray EA NOSTRIL DAILY


   Loperamide [Imodium] 4 mg PO QID PRN


     PRN Reason: Diarrhea


   Budesonide/Formoterol Fumarate [Symbicort 160-4.5 Mcg Inhaler] 2 puff 

INHALATION RT-BID


   Ondansetron Odt [Zofran ODT] 4 mg PO Q8HR PRN


     PRN Reason: Nausea


   Cholecalciferol (Vitamin D3) [Vitamin D3 (125 MCG = 5,000 IU)] 125 mcg PO 

DAILY


   Super B Complex 1 tab PO DAILY


   Ascorbic Acid [Vitamin C] 500 mg PO DAILY


   Vitamin E (Dl,Tocopheryl Acet) [Vitamin E (400 Iu = 180 mg)] 400 unit PO 

DAILY


   Cranberry 15,000 Mg Tab 15,000 mg PO DAILY


   Albuterol Nebulized [Ventolin Nebulized] 2.5 mg INHALATION RT-Q6H PRN


     PRN Reason: Shortness Of Breath


   Albuterol Sulfate [Albuterol Sulfate Hfa] 1 puff INHALATION RT-Q4H PRN


     PRN Reason: Shortness Of Breath


   Omeprazole [PriLOSEC] 20 mg PO AC-SUPPER


   Isosorbide Mononitrate ER [Imdur] 30 mg PO DAILY


   Metoprolol Succinate (ER) [Toprol XL] 50 mg PO HS


   Benzonatate [Tessalon Perles] 100 - 200 mg PO TID PRN


     PRN Reason: Cough


   Propylene Glycol [Systane Complete] 1 drop BOTH EYES HS


   Aspirin EC [Ecotrin Low Dose] 81 mg PO DAILY


   Bismuth Subsalicylate [Pepto-Bismol Ultra] 525 - 1,050 mg PO TID PRN


     PRN Reason: Gi Upset


   Melatonin/Pyridoxine HCl (B6) [Melatonin-Vit B6  5-10 mg Tab] 1 tab PO HS PRN


     PRN Reason: SLEEP


   Multivit-Min/FA/Lycopen/Lutein [Centrum Silver Tablet] 1 tab PO DAILY





Changed


   Gabapentin [Neurontin] 100 mg PO BID #6 cap


   Sevelamer Carbonate 2,400 mg PO TID #0





Discontinued


   Lidocaine-Prilocaine Cream [Emla Cream 2.5%/2.5%] 1 applic TOPICAL AS 

DIRECTED


   amLODIPine [Norvasc] 10 mg PO DAILY


   Midodrine HCl 10 mg PO TID


   Cetirizine HCl 10 mg PO DAILY


   Zinc Gluconate [Zinc] 50 mg PO DAILY


   Magnesium 250 mg PO DAILY


   lisinopriL [Zestril] 10 mg PO DAILY


   hydrALAZINE HCL 10 mg PO TID


   Bumetanide [BUMEX] 4 mg PO TID


   Azithromycin [Zithromax Z Pack] See Taper PO AS DIRECTED


   Potassium Gluconate 99 mg PO DAILY


Discharge Medication List





Albuterol Nebulized [Ventolin Nebulized] 2.5 mg INHALATION RT-Q6H PRN 08/14/23 

[History]


Albuterol Sulfate [Albuterol Sulfate Hfa] 1 puff INHALATION RT-Q4H PRN 08/14/23 

[History]


Atorvastatin Calcium 20 mg PO DAILY 08/14/23 [History]


Benzonatate [Tessalon Perles] 100 - 200 mg PO TID PRN 08/14/23 [History]


Budesonide/Formoterol Fumarate [Symbicort 160-4.5 Mcg Inhaler] 2 puff INHALATION

RT-BID 08/14/23 [History]


Dicyclomine [Bentyl] 10 mg PO TID 08/14/23 [History]


Fluticasone Nasal Spray [Flonase Nasal Spray] 2 spray EA NOSTRIL DAILY 08/14/23 

[History]


Isosorbide Mononitrate ER [Imdur] 30 mg PO DAILY 08/14/23 [History]


Loperamide [Imodium] 4 mg PO QID PRN 08/14/23 [History]


Metoprolol Succinate (ER) [Toprol XL] 50 mg PO HS 08/14/23 [History]


Montelukast [Singulair] 10 mg PO DAILY 08/14/23 [History]


Omeprazole [PriLOSEC] 20 mg PO AC-SUPPER 08/14/23 [History]


Ondansetron Odt [Zofran ODT] 4 mg PO Q8HR PRN 08/14/23 [History]


SILVER sulfADIAZINE Cream [Silvadene 1% Cream] 1 applic TOPICAL BID 08/14/23 

[History]


Ascorbic Acid [Vitamin C] 500 mg PO DAILY 08/15/23 [History]


Aspirin EC [Ecotrin Low Dose] 81 mg PO DAILY 08/15/23 [History]


Bismuth Subsalicylate [Pepto-Bismol Ultra] 525 - 1,050 mg PO TID PRN 08/15/23 

[History]


Cholecalciferol (Vitamin D3) [Vitamin D3 (125 MCG = 5,000 IU)] 125 mcg PO DAILY 

08/15/23 [History]


Cranberry 15,000 Mg Tab 15,000 mg PO DAILY 08/15/23 [History]


Melatonin/Pyridoxine HCl (B6) [Melatonin-Vit B6  5-10 mg Tab] 1 tab PO HS PRN 

08/15/23 [History]


Multivit-Min/FA/Lycopen/Lutein [Centrum Silver Tablet] 1 tab PO DAILY 08/15/23 

[History]


Propylene Glycol [Systane Complete] 1 drop BOTH EYES HS 08/15/23 [History]


Super B Complex 1 tab PO DAILY 08/15/23 [History]


Vitamin E (Dl,Tocopheryl Acet) [Vitamin E (400 Iu = 180 mg)] 400 unit PO DAILY 

08/15/23 [History]


Gabapentin [Neurontin] 100 mg PO BID #6 cap 08/22/23 [Rx]


Sevelamer Carbonate 2,400 mg PO TID #0 08/22/23 [Rx]


hydrALAZINE HCL [Apresoline] 50 mg PO TID  tab 08/22/23 [Rx]








Follow up Appointment(s)/Referral(s): 


Ethan Medellin MD [REFERRING] - 2 Weeks


[facility] - 1 Week


Maria De Jesus Funez DO [STAFF PHYSICIAN] - 2 Weeks (We recommend outpatient CT an

giogram of the head and neck to assess for carotid and intracranial vascular 

disease)


None,Stated [Primary Care Provider] - 1-2 days


Activity/Diet/Wound Care/Special Instructions: 


Discharge to [facility] Bed for rehab at [facility]

on 8/22/23


Wheelchair Van to transport patient at 3:15PM on 8/22/23

## 2023-08-22 NOTE — P.PN
Subjective





Patient is seen in follow-up for end-stage renal disease.  She is maintained on 

hemodialysis on Monday Wednesday Friday schedule.  Resting in bed.  Underwent 

EGD and colonoscopy 08/21/2023 that showed antral gastritis.  Tolerated 3 L 

ultrafiltration yesterday.





Vital signs are stable.


General: No acute distress.


HEENT: Head exam is unremarkable. 


LUNGS: No audible rhonchi or wheezes.


HEART: Rate and Rhythm are regular.


ABDOMEN: Nontender.


EXTREMITITES: Trace edema.





Objective





- Vital Signs


Vital signs: 


                                   Vital Signs











Temp  98 F   08/22/23 12:00


 


Pulse  68   08/22/23 12:00


 


Resp  17   08/22/23 12:00


 


BP  119/69   08/22/23 12:00


 


Pulse Ox  93 L  08/22/23 12:00


 


FiO2      








                                 Intake & Output











 08/21/23 08/22/23 08/22/23





 18:59 06:59 18:59


 


Intake Total 280 1000 240


 


Output Total  3000 


 


Balance 280 -2000 240


 


Weight  73.5 kg 


 


Intake:   


 


    


 


  Oral 180 500 240


 


  Hemodialysis  500 


 


Output:   


 


  Hemodialysis  3000 


 


Other:   


 


  Voiding Method Bedside Commode Bedside Commode 


 


  # Voids 2 1 1


 


  # Bowel Movements 1 1 














- Labs


CBC & Chem 7: 


                                 08/22/23 08:00





                                 08/22/23 08:00


Labs: 


                  Abnormal Lab Results - Last 24 Hours (Table)











  08/21/23 08/22/23 08/22/23 Range/Units





  08:37 08:00 08:00 


 


RBC    3.27 L  (3.80-5.40)  m/uL


 


Hgb    10.3 L  (11.4-16.0)  gm/dL


 


Hct    31.5 L  (34.0-46.0)  %


 


RDW    15.6 H  (11.5-15.5)  %


 


Plt Count    140 L  (150-450)  k/uL


 


Sodium   131 L   (137-145)  mmol/L


 


Chloride   90 L   ()  mmol/L


 


Creatinine   2.92 H   (0.52-1.04)  mg/dL


 


Glucose   113 H   (74-99)  mg/dL


 


Phosphorus  2.4 L    (2.5-4.5)  mg/dL














Assessment and Plan


Plan: 





Assessment:


1.  End-stage renal disease maintained on hemodialysis on Monday Wednesday Friday schedule.


2.  Severe hyperkalemia secondary to end-stage renal disease.  Improved 

postdialysis.


3.  Bradycardia secondary to hyperkalemia.  Metoprolol discontinued.  Improved.


4.  Volume overload.  Improved with ultrafiltration.


5.  History of CVA.


6.  Chronic kidney disease mineral bone disease maintained on Renvela.


7.  Anemia of chronic kidney disease.  High ferritin noted.  EGD showed antral 

gastritis.  Colonoscopy normal.  On Aranesp.


8.  Hyponatremia secondary to chronic kidney disease.





Plan:


Hemodialysis tomorrow.


Stop Renvela as phosphorus was low at 2.4 dated 08/21/2023.

## 2023-08-22 NOTE — P.PN
Subjective


Progress Note Date: 08/21/23 08/21/2023: Patient was seen for a follow-up.  Patient is getting hemodialysis. 

Patient believes that her vertigo was related to inner ear infection.  Patient 

appears much more alert and awake.  She however has more myoclonic twitching of 

her face.  Also myoclonic jerking of outstretched hands.





08/19/2023: Patient was initially seen by Dr. Mauro Monae.  Please refer to his 

note for details.





Patient is a 63-year-old female with vertigo, ptosis of both eyes.  MRI of the 

brain is negative.  She had eye surgery in the past.  Patient has generalized 

weakness.  Patient states that every time she turns her head, gets worse.  When 

she is laying down, room spins.  She is complaining of sharp pain in the ears 

bilaterally, left more than right.  She states that she has history of ear 

infections in the past.





Telemetry monitoring showing sinus rhythm, with sinus bradycardia, some PVCs and

PACs.








Some of the workup during this hospital visit consisted of:


Heart rates upon presentation was in the 40s


Initial potassium is 7.4 that improved to 4.7.


Creatinine 7.11 improved to 4.08--3.04


Vital B12 is 1075


TSH is a 1.230


CK levels 113.


AST 71 ALT of 39.


TSH is 1.230


CT of the head is reported as no acute intracranial process.  Left frontal soft 

tissue contusion.  No evidence of fracture.  I personally reviewed the CT of 

head and I agree with the report.


MRI of the brain without is reported as no acute intracranial process.  Numerous

bilateral focal area of abnormal signal white matter are nonspecific and can be 

an associate with hypertension, remote microvascular ischemic white matter 

disease or demyelinating the correlate clinically.  Left frontal soft tissue 

subcutaneous hematoma stable.  I personally reviewed the MRI and I agree there 

is no acute or subacute ischemia.  Regarding the FLAIR white matter 

hyperintensities I feel they're not specific


Carotid duplex is reported as moderate approaching severe stenosis of right 

internal carotid artery approaching 69%.  Moderate narrowing between 50-69% left

ICA to carotid artery based on velocity.  Incidental note made on 1 cm nodule in

the left lobe thyroid.  Consider dedicated thyroid ultrasound workup.














Objective





- Vital Signs


Vital signs: 


                                   Vital Signs











Temp  96.7 F L  08/22/23 08:00


 


Pulse  85   08/22/23 08:00


 


Resp  16   08/22/23 08:00


 


BP  174/91   08/22/23 08:00


 


Pulse Ox  97   08/22/23 08:00


 


FiO2      








                                 Intake & Output











 08/21/23 08/22/23 08/22/23





 18:59 06:59 18:59


 


Intake Total 280 1000 


 


Output Total  3000 


 


Balance 280 -2000 


 


Weight  73.5 kg 


 


Intake:   


 


    


 


  Oral 180 500 


 


  Hemodialysis  500 


 


Output:   


 


  Hemodialysis  3000 


 


Other:   


 


  Voiding Method Bedside Commode Bedside Commode 


 


  # Voids 2 1 


 


  # Bowel Movements 1 1 














- Exam





Patient is alert and awake, in no distress.  Speech is completely clear with no 

aphasia or dysarthria.  She knows it is August and the year is 23 and that she 

is in [facility].





Cranial nerves are significant for significant Exopthalmos bilaterally.  Patient

appears to have some significant bilateral ophthalmoplegia.  Pupils are equal, 

round and reacting, face is symmetric.  She has less ptosis today.  Patient is 

having some myoclonic twitching of her facial region.





On muscle strength testing, patient was giving extremely decreased effort.  Her 

deltoids were 3+4-, biceps 4 to 4-, triceps 4-, hip flexion 4-, ankle 

dorsiflexion 5-.  The strength was fluctuating it appears and was improving with

endurance and repeated instructions.  It took almost 10 attempts before patient 

was able to give effort of 5-for the ankles otherwise it was only 3+.


Patient has moderate myoclonic jerks of outstretched hands.





Reflexes are 1+ to 2+.  Sensations are equal.





- Labs


CBC & Chem 7: 


                                 08/22/23 08:00





                                 08/22/23 08:00


Labs: 


                  Abnormal Lab Results - Last 24 Hours (Table)











  08/21/23 08/22/23 08/22/23 Range/Units





  08:37 08:00 08:00 


 


RBC    3.27 L  (3.80-5.40)  m/uL


 


Hgb    10.3 L  (11.4-16.0)  gm/dL


 


Hct    31.5 L  (34.0-46.0)  %


 


RDW    15.6 H  (11.5-15.5)  %


 


Plt Count    140 L  (150-450)  k/uL


 


Sodium   131 L   (137-145)  mmol/L


 


Chloride   90 L   ()  mmol/L


 


Creatinine   2.92 H   (0.52-1.04)  mg/dL


 


Glucose   113 H   (74-99)  mg/dL


 


Phosphorus  2.4 L    (2.5-4.5)  mg/dL














Assessment and Plan


Assessment: 





This is a 63-year-old woman history of end-stage renal disease on dialysis, 

congestive heart failure who is having syncopal episode at home and she states 

the her episode happened upon her blood pressure dropping.  As a result she had 

a fall and hit her head.  She's complaining of the dizziness as well as was 

having vomiting and the vomiting is resolved.  He is having generalized 

weakness.  Upon presented to our hospital her systolic blood pressure was in the

80s.





Acute Vertigo: Seems due to hypotensive episodes and possible bradycardia.  On 

MRI there is no acute or subacute ischemia.  


Carotid stenosis is right ICA of close to 69% while left is between 50-69% on 

carotid duplex.  I doubt this is the culprit for the patient's vertigo.


Syncopal episodes likely due to her hypotensive episode.  EEG is negative for 

any seizure or any epileptiform discharges.


Patient has bilateral ptosis as well as restriction movement of eyes on the 

lateral gaze more restriction look into the right and left and patient stated 

that she had eye correction in the past, doubt this is a central cause and  

appears it is more ophthalmological related that seems old.  





BradyCardia in 40's but per patient's nurse where as low as 20-30's.


Hypotensive episodes as well as systolic in the 80s


hyperkalenia as high as 7.4 --resolved


Generalized weakness


Congestive heart failure


End-stage renal disease on dialysis








Plan: 








Patient has bilateral exopthalmos, ptosis as well as some ophthalmoparesis.   

Acetylcholine receptor antibody is ordered and is pending.  Recommend the 

patient follow up with ophthalmologist and neurologist for further evaluation.  

Thyroid functions are normal.


Patient appears to have more myoclonic twitching of her facial region and 

extremities.  Patient currently on gabapentin 200 mg twice a day, which is not a

very high-dose for renal failure patient.  Rule out any other infectious source 

or metabolic dysfunction leading to increased myoclonic jerks.  IM to address.





MRI the brain was negative for any acute or subacute ischemic stroke.


Regarding the bilateral carotid stenosis between 50-69%, vascular surgery has 

seen the patient, recommending symptomatic and supportive care.  Recommend CT 

angiogram of head and neck which can be done as outpatient.  Continue Lipitor.  

They recommended patient to follow up in the office for carotid surveillance.  

No indication at this time for vascular surgical intervention. 





Pending orthostatic vitals


PT and OT are consulted


Patient was placed on midodrine 10 mg 1 tablet 3 times a day by primary team.  

Please avoid any further hypotensive episode.


We'll defer the rest of the medical management to the primary team and other 

specialists

## 2023-10-23 ENCOUNTER — HOSPITAL ENCOUNTER (INPATIENT)
Dept: HOSPITAL 47 - EC | Age: 63
LOS: 2 days | Discharge: HOME HEALTH | DRG: 70 | End: 2023-10-25
Attending: HOSPITALIST | Admitting: HOSPITALIST
Payer: MEDICARE

## 2023-10-23 DIAGNOSIS — E87.5: ICD-10-CM

## 2023-10-23 DIAGNOSIS — K29.70: ICD-10-CM

## 2023-10-23 DIAGNOSIS — Z79.899: ICD-10-CM

## 2023-10-23 DIAGNOSIS — Z99.2: ICD-10-CM

## 2023-10-23 DIAGNOSIS — G93.41: Primary | ICD-10-CM

## 2023-10-23 DIAGNOSIS — K21.9: ICD-10-CM

## 2023-10-23 DIAGNOSIS — I65.21: ICD-10-CM
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
## 2023-10-23 DIAGNOSIS — N18.6: ICD-10-CM
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
## 2023-10-23 DIAGNOSIS — Z88.0: ICD-10-CM

## 2023-10-23 DIAGNOSIS — I50.9: ICD-10-CM

## 2023-10-23 DIAGNOSIS — E83.9: ICD-10-CM

## 2023-10-23 DIAGNOSIS — Z79.51: ICD-10-CM

## 2023-10-23 DIAGNOSIS — I13.2: ICD-10-CM

## 2023-10-23 DIAGNOSIS — Z88.8: ICD-10-CM

## 2023-10-23 DIAGNOSIS — D63.1: ICD-10-CM

## 2023-10-23 DIAGNOSIS — Z79.82: ICD-10-CM

## 2023-10-23 DIAGNOSIS — Z88.2: ICD-10-CM

## 2023-10-23 DIAGNOSIS — G62.9: ICD-10-CM

## 2023-10-23 DIAGNOSIS — J44.9: ICD-10-CM

## 2023-10-23 DIAGNOSIS — Z88.1: ICD-10-CM

## 2023-10-23 DIAGNOSIS — F05: ICD-10-CM

## 2023-10-23 LAB
ALBUMIN SERPL-MCNC: 4 G/DL (ref 3.5–5)
ALP SERPL-CCNC: 105 U/L (ref 38–126)
ALT SERPL-CCNC: 19 U/L (ref 4–34)
ANION GAP SERPL CALC-SCNC: 17 MMOL/L
APTT BLD: 22.8 SEC (ref 22–30)
AST SERPL-CCNC: 25 U/L (ref 14–36)
BASOPHILS # BLD AUTO: 0 K/UL (ref 0–0.2)
BASOPHILS NFR BLD AUTO: 0 %
BUN SERPL-SCNC: 43 MG/DL (ref 7–17)
CALCIUM SPEC-MCNC: 9 MG/DL (ref 8.4–10.2)
CHLORIDE SERPL-SCNC: 92 MMOL/L (ref 98–107)
CO2 SERPL-SCNC: 22 MMOL/L (ref 22–30)
EOSINOPHIL # BLD AUTO: 0.2 K/UL (ref 0–0.7)
EOSINOPHIL NFR BLD AUTO: 2 %
ERYTHROCYTE [DISTWIDTH] IN BLOOD BY AUTOMATED COUNT: 3.28 M/UL (ref 3.8–5.4)
ERYTHROCYTE [DISTWIDTH] IN BLOOD: 17.2 % (ref 11.5–15.5)
GLUCOSE SERPL-MCNC: 83 MG/DL (ref 74–99)
HBV SURFACE AB SERPL IA-ACNC: 1000 MIU/ML
HCT VFR BLD AUTO: 31.6 % (ref 34–46)
HGB BLD-MCNC: 10.5 GM/DL (ref 11.4–16)
INR PPP: 1.2 (ref ?–1.2)
LYMPHOCYTES # SPEC AUTO: 1.2 K/UL (ref 1–4.8)
LYMPHOCYTES NFR SPEC AUTO: 9 %
MAGNESIUM SPEC-SCNC: 3.2 MG/DL (ref 1.6–2.3)
MCH RBC QN AUTO: 32.1 PG (ref 25–35)
MCHC RBC AUTO-ENTMCNC: 33.3 G/DL (ref 31–37)
MCV RBC AUTO: 96.3 FL (ref 80–100)
MONOCYTES # BLD AUTO: 0.6 K/UL (ref 0–1)
MONOCYTES NFR BLD AUTO: 4 %
NEUTROPHILS # BLD AUTO: 11.1 K/UL (ref 1.3–7.7)
NEUTROPHILS NFR BLD AUTO: 84 %
PLATELET # BLD AUTO: 111 K/UL (ref 150–450)
POTASSIUM SERPL-SCNC: 7.3 MMOL/L (ref 3.5–5.1)
PROT SERPL-MCNC: 5.9 G/DL (ref 6.3–8.2)
PT BLD: 12.5 SEC (ref 10–12.5)
SODIUM SERPL-SCNC: 131 MMOL/L (ref 137–145)
T4 FREE SERPL-MCNC: 1.2 NG/DL (ref 0.78–2.19)
WBC # BLD AUTO: 13.2 K/UL (ref 3.8–10.6)

## 2023-10-23 PROCEDURE — 83735 ASSAY OF MAGNESIUM: CPT

## 2023-10-23 PROCEDURE — 84443 ASSAY THYROID STIM HORMONE: CPT

## 2023-10-23 PROCEDURE — 93970 EXTREMITY STUDY: CPT

## 2023-10-23 PROCEDURE — 36415 COLL VENOUS BLD VENIPUNCTURE: CPT

## 2023-10-23 PROCEDURE — 5A1D70Z PERFORMANCE OF URINARY FILTRATION, INTERMITTENT, LESS THAN 6 HOURS PER DAY: ICD-10-PCS

## 2023-10-23 PROCEDURE — 85025 COMPLETE CBC W/AUTO DIFF WBC: CPT

## 2023-10-23 PROCEDURE — 84484 ASSAY OF TROPONIN QUANT: CPT

## 2023-10-23 PROCEDURE — 87340 HEPATITIS B SURFACE AG IA: CPT

## 2023-10-23 PROCEDURE — 99291 CRITICAL CARE FIRST HOUR: CPT

## 2023-10-23 PROCEDURE — 85610 PROTHROMBIN TIME: CPT

## 2023-10-23 PROCEDURE — 96375 TX/PRO/DX INJ NEW DRUG ADDON: CPT

## 2023-10-23 PROCEDURE — 71046 X-RAY EXAM CHEST 2 VIEWS: CPT

## 2023-10-23 PROCEDURE — 94760 N-INVAS EAR/PLS OXIMETRY 1: CPT

## 2023-10-23 PROCEDURE — 86706 HEP B SURFACE ANTIBODY: CPT

## 2023-10-23 PROCEDURE — 90935 HEMODIALYSIS ONE EVALUATION: CPT

## 2023-10-23 PROCEDURE — 94640 AIRWAY INHALATION TREATMENT: CPT

## 2023-10-23 PROCEDURE — 70450 CT HEAD/BRAIN W/O DYE: CPT

## 2023-10-23 PROCEDURE — 85730 THROMBOPLASTIN TIME PARTIAL: CPT

## 2023-10-23 PROCEDURE — 84100 ASSAY OF PHOSPHORUS: CPT

## 2023-10-23 PROCEDURE — 80053 COMPREHEN METABOLIC PANEL: CPT

## 2023-10-23 PROCEDURE — 93005 ELECTROCARDIOGRAM TRACING: CPT

## 2023-10-23 PROCEDURE — 83880 ASSAY OF NATRIURETIC PEPTIDE: CPT

## 2023-10-23 PROCEDURE — 96365 THER/PROPH/DIAG IV INF INIT: CPT

## 2023-10-23 PROCEDURE — 84132 ASSAY OF SERUM POTASSIUM: CPT

## 2023-10-23 PROCEDURE — 84481 FREE ASSAY (FT-3): CPT

## 2023-10-23 PROCEDURE — 84439 ASSAY OF FREE THYROXINE: CPT

## 2023-10-23 RX ADMIN — ASPIRIN 81 MG CHEWABLE TABLET SCH MG: 81 TABLET CHEWABLE at 17:00

## 2023-10-23 RX ADMIN — PANTOPRAZOLE SODIUM SCH MG: 40 TABLET, DELAYED RELEASE ORAL at 17:00

## 2023-10-23 RX ADMIN — ATORVASTATIN CALCIUM SCH MG: 20 TABLET, FILM COATED ORAL at 17:00

## 2023-10-23 RX ADMIN — BUMETANIDE SCH MG: 1 TABLET ORAL at 20:42

## 2023-10-23 RX ADMIN — METOPROLOL SUCCINATE SCH MG: 50 TABLET, EXTENDED RELEASE ORAL at 20:43

## 2023-10-23 RX ADMIN — ISOSORBIDE MONONITRATE SCH MG: 30 TABLET, EXTENDED RELEASE ORAL at 17:00

## 2023-10-23 RX ADMIN — BUDESONIDE AND FORMOTEROL FUMARATE DIHYDRATE SCH PUFF: 160; 4.5 AEROSOL RESPIRATORY (INHALATION) at 21:39

## 2023-10-23 RX ADMIN — SEVELAMER CARBONATE SCH MG: 800 TABLET, FILM COATED ORAL at 17:00

## 2023-10-23 RX ADMIN — ASPIRIN SCH: 325 TABLET ORAL at 15:23

## 2023-10-23 RX ADMIN — DEXTRAN 70 AND HYPROMELLOSE 2910 SCH DROPS: 1; 3 SOLUTION/ DROPS OPHTHALMIC at 20:42

## 2023-10-23 RX ADMIN — BUMETANIDE SCH: 1 TABLET ORAL at 15:23

## 2023-10-23 RX ADMIN — ALBUTEROL SULFATE PRN PUFF: 90 AEROSOL, METERED RESPIRATORY (INHALATION) at 21:41

## 2023-10-23 NOTE — CT
EXAMINATION TYPE: CT brain wo con

CT DLP: 1130.1 mGycm, Automated exposure control for dose reduction was used.

 

DATE OF EXAM: 10/23/2023 9:35 AM

 

COMPARISON: Prior CT Brain from 8/15/2023 .

 

CLINICAL INDICATION:Female, 63 years old with history of syncope, ams, syncope

 

TECHNIQUE: 

Brain: Multiple axial CT images of the brain were obtained without IV contrast. Coronal and sagittal 
reformats reviewed.

 

FINDINGS: Motion degraded examination.

 

Brain:

Extra-axial spaces: No abnormal extra-axial fluid collections.

Ventricular system: Within normal limits

Cerebral parenchyma: Cerebral atrophy. No acute intraparenchymal hemorrhage or mass effect.  The gray
-white junction is well differentiated. Scattered hypoattenuating areas are seen within the white mat
ter.  

Cerebellum: Unremarkable.

Mass effect: No evidence of midline shift.

Intracranial vasculature: Atherosclerotic calcifications of the intracranial vessels.

Soft tissues: Normal.

Calvarium/osseous structures: No depressed skull fracture.

Paranasal sinuses and mastoid air cells: Clear

Visualized orbits: Orbital contents are intact.

 

 

IMPRESSION:

1. No acute intracranial process.

2. Nonspecific white matter changes, likely secondary to chronic small vessel ischemic disease.

## 2023-10-23 NOTE — ED
General Adult HPI





- General


Chief complaint: Syncope


Stated complaint: Syncope


Time Seen by Provider: 10/23/23 07:32


Source: patient, EMS, RN notes reviewed


Mode of arrival: EMS


Limitations: no limitations





- History of Present Illness


Initial comments: 





Patient is a pleasant 63-year-old female presenting to the emergency Department 

with near syncopal episode.  Episode occurred prior to arrival.  Patient was 

going to dialysis.  Patient was tired and fell to the floor.  No loss of 

consciousness.  No syncope.  Patient is somewhat a poor historian and states 

nothing is bothering her.  Patient denies any injury.





- Related Data


                                Home Medications











 Medication  Instructions  Recorded  Confirmed


 


Albuterol Nebulized [Ventolin 2.5 mg INHALATION RT-Q6H PRN 08/14/23 08/14/23





Nebulized]   


 


Albuterol Sulfate [Albuterol 1 puff INHALATION RT-Q4H PRN 08/14/23 08/14/23





Sulfate Hfa]   


 


Atorvastatin Calcium 20 mg PO DAILY 08/14/23 08/14/23


 


Benzonatate [Tessalon Perles] 100 - 200 mg PO TID PRN 08/14/23 08/14/23


 


Budesonide/Formoterol Fumarate 2 puff INHALATION RT-BID 08/14/23 08/14/23





[Symbicort 160-4.5 Mcg Inhaler]   


 


Dicyclomine [Bentyl] 10 mg PO TID 08/14/23 08/14/23


 


Fluticasone Nasal Spray [Flonase 2 spray EA NOSTRIL DAILY 08/14/23 08/14/23





Nasal Spray]   


 


Isosorbide Mononitrate ER [Imdur] 30 mg PO DAILY 08/14/23 08/14/23


 


Loperamide [Imodium] 4 mg PO QID PRN 08/14/23 08/14/23


 


Metoprolol Succinate (ER) [Toprol 50 mg PO HS 08/14/23 08/15/23





XL]   


 


Montelukast [Singulair] 10 mg PO DAILY 08/14/23 08/14/23


 


Omeprazole [PriLOSEC] 20 mg PO AC-SUPPER 08/14/23 08/14/23


 


Ondansetron Odt [Zofran ODT] 4 mg PO Q8HR PRN 08/14/23 08/14/23


 


SILVER sulfADIAZINE Cream 1 applic TOPICAL BID 08/14/23 08/14/23





[Silvadene 1% Cream]   


 


Ascorbic Acid [Vitamin C] 500 mg PO DAILY 08/15/23 08/15/23


 


Aspirin EC [Ecotrin Low Dose] 81 mg PO DAILY 08/15/23 08/15/23


 


Bismuth Subsalicylate 525 - 1,050 mg PO TID PRN 08/15/23 08/15/23





[Pepto-Bismol Ultra]   


 


Cholecalciferol (Vitamin D3) 125 mcg PO DAILY 08/15/23 08/15/23





[Vitamin D3 (125 MCG = 5,000 IU)]   


 


Cranberry 15,000 Mg Tab 15,000 mg PO DAILY 08/15/23 08/15/23


 


Melatonin/Pyridoxine HCl (B6) 1 tab PO HS PRN 08/15/23 08/15/23





[Melatonin-Vit B6  5-10 mg Tab]   


 


Multivit-Min/FA/Lycopen/Lutein 1 tab PO DAILY 08/15/23 08/15/23





[Centrum Silver Tablet]   


 


Propylene Glycol [Systane Complete] 1 drop BOTH EYES HS 08/15/23 08/15/23


 


Super B Complex 1 tab PO DAILY 08/15/23 08/15/23


 


Vitamin E (Dl,Tocopheryl Acet) 400 unit PO DAILY 08/15/23 08/15/23





[Vitamin E (400 Iu = 180 mg)]   








                                  Previous Rx's











 Medication  Instructions  Recorded


 


Gabapentin [Neurontin] 100 mg PO BID #6 cap 08/22/23


 


Pantoprazole Sodium [Protonix] 40 mg PO DAILY #30 tab 08/22/23


 


Sevelamer Carbonate 2,400 mg PO TID #0 08/22/23


 


hydrALAZINE HCL [Apresoline] 50 mg PO TID  tab 08/22/23











                                    Allergies











Allergy/AdvReac Type Severity Reaction Status Date / Time


 


erythromycin base Allergy  Unknown Verified 10/23/23 10:47


 


furosemide [From Lasix] Allergy  Unknown Verified 10/23/23 10:47


 


green tea Allergy  Unknown Verified 10/23/23 10:47


 


Penicillins Allergy  Unknown Verified 10/23/23 10:47


 


strawberry Allergy  Unknown Verified 10/23/23 10:47


 


Sulfa (Sulfonamide Allergy  Unknown Verified 10/23/23 10:47





Antibiotics)     


 


sulfacetamide Allergy  Unknown Verified 10/23/23 10:47














Review of Systems


ROS Statement: 


Those systems with pertinent positive or pertinent negative responses have been 

documented in the HPI.





ROS Other: All systems not noted in ROS Statement are negative.


Constitutional: Denies: fever


Eyes: Denies: eye pain


ENT: Denies: ear pain


Respiratory: Denies: dyspnea


Cardiovascular: Denies: chest pain


Gastrointestinal: Denies: abdominal pain


Neurological: Denies: headache, weakness, confusion





Past Medical History


Past Medical History: Heart Failure, COPD, CVA/TIA, Dialysis, GERD/Reflux, 

Thyroid Disorder


History of Any Multi-Drug Resistant Organisms: Unobtainable


Additional Past Surgical History / Comment(s): Dialysis cath placement


Past Anesthesia/Blood Transfusion Reactions: No Reported Reaction


Past Psychological History: No Psychological Hx Reported


Smoking Status: Unknown if ever smoked


Past Alcohol Use History: Unable to Obtain


Past Drug Use History: Unable to Obtain





General Exam


Limitations: no limitations


General appearance: in no apparent distress, other (Drowsy but arousable to 

voice)


Head exam: Present: normocephalic


Eye exam: Present: normal appearance, PERRL, EOMI


ENT exam: Present: other (Lips appear dry)


Neck exam: Present: normal inspection.  Absent: tenderness, meningismus


Respiratory exam: Present: normal lung sounds bilaterally


Cardiovascular Exam: Present: regular rate, normal rhythm


GI/Abdominal exam: Present: soft.  Absent: tenderness


Extremities exam: Present: full ROM, pedal edema.  Absent: tenderness


Neurological exam: Present: oriented X3, CN II-XII intact.  Absent: motor 

sensory deficit


  ** Expanded


Neurological exam: Present: protecting the airway


Patient oriented to: Present: person, place, time


Motor strength exam: RUE: 5, LUE: 5, RLE: 5, LLE: 5


Eye Response: (3) open to voice


Motor Response: (6) obeys commands


Verbal Response: (5) oriented


Psychiatric exam: Present: normal affect, normal mood


Skin exam: Present: normal color





Course


                                   Vital Signs











  10/23/23 10/23/23





  07:35 09:14


 


Temperature 97.8 F 


 


Pulse Rate 60 52 L


 


Respiratory 16 14





Rate  


 


Blood Pressure 129/67 135/61


 


O2 Sat by Pulse 100 100





Oximetry  














EKG Findings





- EKG Results:


EKG: interpreted by ERMD, sinus rhythm, normal axis, normal QRS, normal ST/T


EKG shows: bradycardia





Medical Decision Making





- Medical Decision Making





Was pt. sent in by a medical professional or institution (Dr., PA, NP, urgent 

care, hospital, or nursing home...) When possible be specific


@  -No


Did you speak to anyone other than the patient for history (EMS, parent, family,

police, friend...)? What history was obtained from this source 


@  -No


Did you review nursing and triage notes (agree or disagree)?  Why? 


@  -I reviewed and agree with nursing and triage notes


Were old charts reviewed (outside hosp., previous admission, EMS record, old 

EKG, old radiological studies, urgent care reports/EKG's, nursing home records)?

Report findings 


@  -No old charts were reviewed


Differential Diagnosis (chest pain, altered mental status, abdominal pain women,

abdominal pain men, vaginal bleeding, weakness, fever, dyspnea, syncope, 

headache, dizziness, GI bleed, back pain, seizure, CVA, palpatations, mental 

health, musculoskeletal)? 


@  -Differential Altered Mental Status:


Hypoglycemia, DKA, hypercapnia, ETOH, overdose, CO poisoning, trauma, myxedema 

coma, HTN encephalopathy, infection, encephalitis, psychosis, intercranial 

hemorrhage, hepatic encephalopathy, meningitis, CVA, this is not meant to be an 

all-inclusive list


EKG interpreted by me (3pts min.).


@  -As above


X-rays interpreted by me (1pt min.).


@  -Chest x-ray does show some cardiomegaly and fluid retention


CT interpreted by me (1pt min.).


@  -None done


U/S interpreted by me (1pt. min.).


@  -None done


What testing was considered but not performed or refused? (CT, X-rays, U/S, 

labs)? Why?


@  -None


What meds were considered but not given or refused? Why?


@  -None


Did you discuss the management of the patient with other professionals ([facility] i.e. BRITTNEY Monzon, NP, lab, RT, psych nurse, , , 

teacher, , )? Give summary


@  -Case was discussed with Dr. Navarrete, who will admit, Dr. Jacobson.  Case also

discussed with Dr. Bone who will consult and have patient undergo dialysis


Was smoking cessation discussed for >3mins.?


@  -No


Was critical care preformed (if so, how long)?


@  -33 minutes critical care time


Were there social determinants of health that impacted care today? How? 

(Homelessness, low income, unemployed, alcoholism, drug addiction, 

transportation, low edu. Level, literacy, decrease access to med. care, long term, 

rehab)?


@  -No


Was there de-escalation of care discussed even if they declined (Discuss DNR or 

withdrawal of care, Hospice)? DNR status


@  -No


What co-morbidities impacted this encounter? (DM, HTN, Smoking, COPD, CAD, 

Cancer, CVA, ARF, Chemo, Hep., AIDS, mental health diagnosis, sleep apnea, 

morbid obesity)?


@  -None


Was patient admitted / discharged? Hospital course, mention meds given and 

route, prescriptions, significant lab abnormalities, going to OR and other 

pertinent info.


@  -Patient reevaluated.  Patient updated.  Patient presents with fluid overload

state.  Patient will need urgent dialysis.  Patient also with hyperkalemia.  

Patient given several medications for hyperkalemia.


Undiagnosed new problem with uncertain prognosis?


@  -No


Drug Therapy requiring intensive monitoring for toxicity (Heparin, Nitro, 

Insulin, Cardizem)?


@  -No


Were any procedures done?


@  -No


Diagnosis/symptom?


@  -Hyperkalemia, pulmonary edema


Acute, or Chronic, or Acute on Chronic?


@  -Acute, acute


Uncomplicated (without systemic symptoms) or Complicated (systemic symptoms)?


@  -default


Side effects of treatment?


@  -No


Exacerbation, Progression, or Severe Exacerbation?


@  -No


Poses a threat to life or bodily function? How? (Chest pain, USA, MI, pneumonia,

PE, COPD, DKA, ARF, appy, cholecystitis, CVA, Diverticulitis, Homicidal, 

Suicidal, threat to staff... and all critical care pts)


@  -Hyperkalemia has high threat potential for life and cardiac function and as 

well as other.





- Lab Data


Result diagrams: 


                                 10/23/23 09:54





                                 10/23/23 08:03


                                   Lab Results











  10/23/23 10/23/23 10/23/23 Range/Units





  08:03 08:03 08:03 


 


WBC     (3.8-10.6)  k/uL


 


RBC     (3.80-5.40)  m/uL


 


Hgb     (11.4-16.0)  gm/dL


 


Hct     (34.0-46.0)  %


 


MCV     (80.0-100.0)  fL


 


MCH     (25.0-35.0)  pg


 


MCHC     (31.0-37.0)  g/dL


 


RDW     (11.5-15.5)  %


 


Plt Count     (150-450)  k/uL


 


MPV     


 


Neutrophils %     %


 


Lymphocytes %     %


 


Monocytes %     %


 


Eosinophils %     %


 


Basophils %     %


 


Neutrophils #     (1.3-7.7)  k/uL


 


Lymphocytes #     (1.0-4.8)  k/uL


 


Monocytes #     (0-1.0)  k/uL


 


Eosinophils #     (0-0.7)  k/uL


 


Basophils #     (0-0.2)  k/uL


 


Hypochromasia     


 


Poikilocytosis     


 


Anisocytosis     


 


Macrocytosis     


 


PT  12.5    (10.0-12.5)  sec


 


INR  1.2 H    (<1.2)  


 


APTT  22.8    (22.0-30.0)  sec


 


Sodium   131 L   (137-145)  mmol/L


 


Potassium   7.3 H*   (3.5-5.1)  mmol/L


 


Chloride   92 L   ()  mmol/L


 


Carbon Dioxide   22   (22-30)  mmol/L


 


Anion Gap   17   mmol/L


 


BUN   43 H   (7-17)  mg/dL


 


Creatinine   6.41 H   (0.52-1.04)  mg/dL


 


Est GFR (CKD-EPI)AfAm   7   (>60 ml/min/1.73 sqM)  


 


Est GFR (CKD-EPI)NonAf   6   (>60 ml/min/1.73 sqM)  


 


Glucose   83   (74-99)  mg/dL


 


Calcium   9.0   (8.4-10.2)  mg/dL


 


Magnesium   3.2 H   (1.6-2.3)  mg/dL


 


Total Bilirubin   0.8   (0.2-1.3)  mg/dL


 


AST   25   (14-36)  U/L


 


ALT   19   (4-34)  U/L


 


Alkaline Phosphatase   105   ()  U/L


 


Troponin I    <0.012  (0.000-0.034)  ng/mL


 


NT-Pro-B Natriuret Pep     pg/mL


 


Total Protein   5.9 L   (6.3-8.2)  g/dL


 


Albumin   4.0   (3.5-5.0)  g/dL


 


TSH   1.050   (0.465-4.680)  mIU/L


 


Free T4   1.20   (0.78-2.19)  ng/dL


 


Free T3 pg/mL   3.7   (2.8-5.3)  pg/ml














  10/23/23 10/23/23 Range/Units





  08:03 09:54 


 


WBC   13.2 H  (3.8-10.6)  k/uL


 


RBC   3.28 L  (3.80-5.40)  m/uL


 


Hgb   10.5 L  (11.4-16.0)  gm/dL


 


Hct   31.6 L  (34.0-46.0)  %


 


MCV   96.3  (80.0-100.0)  fL


 


MCH   32.1  (25.0-35.0)  pg


 


MCHC   33.3  (31.0-37.0)  g/dL


 


RDW   17.2 H  (11.5-15.5)  %


 


Plt Count   111 L  (150-450)  k/uL


 


MPV   10.2  


 


Neutrophils %   84  %


 


Lymphocytes %   9  %


 


Monocytes %   4  %


 


Eosinophils %   2  %


 


Basophils %   0  %


 


Neutrophils #   11.1 H  (1.3-7.7)  k/uL


 


Lymphocytes #   1.2  (1.0-4.8)  k/uL


 


Monocytes #   0.6  (0-1.0)  k/uL


 


Eosinophils #   0.2  (0-0.7)  k/uL


 


Basophils #   0.0  (0-0.2)  k/uL


 


Hypochromasia   Slight  


 


Poikilocytosis   Slight  


 


Anisocytosis   Slight  


 


Macrocytosis   Slight  


 


PT    (10.0-12.5)  sec


 


INR    (<1.2)  


 


APTT    (22.0-30.0)  sec


 


Sodium    (137-145)  mmol/L


 


Potassium    (3.5-5.1)  mmol/L


 


Chloride    ()  mmol/L


 


Carbon Dioxide    (22-30)  mmol/L


 


Anion Gap    mmol/L


 


BUN    (7-17)  mg/dL


 


Creatinine    (0.52-1.04)  mg/dL


 


Est GFR (CKD-EPI)AfAm    (>60 ml/min/1.73 sqM)  


 


Est GFR (CKD-EPI)NonAf    (>60 ml/min/1.73 sqM)  


 


Glucose    (74-99)  mg/dL


 


Calcium    (8.4-10.2)  mg/dL


 


Magnesium    (1.6-2.3)  mg/dL


 


Total Bilirubin    (0.2-1.3)  mg/dL


 


AST    (14-36)  U/L


 


ALT    (4-34)  U/L


 


Alkaline Phosphatase    ()  U/L


 


Troponin I    (0.000-0.034)  ng/mL


 


NT-Pro-B Natriuret Pep  2360   pg/mL


 


Total Protein    (6.3-8.2)  g/dL


 


Albumin    (3.5-5.0)  g/dL


 


TSH    (0.465-4.680)  mIU/L


 


Free T4    (0.78-2.19)  ng/dL


 


Free T3 pg/mL    (2.8-5.3)  pg/ml














Critical Care Time


Critical Care Time: Yes


Total Critical Care Time: 33





Disposition


Clinical Impression: 


 Hyperkalemia, Pulmonary edema





Disposition: ADMITTED AS IP TO THIS hospitals


Condition: Serious


Is patient prescribed a controlled substance at d/c from ED?: No


Referrals: 


Isaiah Jacobson MD [Primary Care Provider] - 1-2 days


Time of Disposition: 11:20

## 2023-10-23 NOTE — XR
EXAMINATION TYPE: XR chest 2V

 

DATE OF EXAM: 10/23/2023 9:41 AM

 

COMPARISON: Chest radiographs from 8/15/2023

 

TECHNIQUE: XR chest 2V Frontal and lateral views of the chest.

 

CLINICAL INDICATION:Female, 63 years old with history of syncope; 

 

FINDINGS: Patient is rotated which was evaluation.

Lungs/Pleura: There is no evidence of pleural effusion, focal consolidation, or pneumothorax.  

Pulmonary vascularity: Mild pulmonary vascular congestion.

Heart/mediastinum: Cardiomediastinal silhouette is enlarged and stable.

Musculoskeletal: No acute osseous pathology. Degenerative changes of the thoracic spine with increase
d thoracic kyphosis.

 

Other findings: None

 

Lines/Tubes:

Right IJ dialysis catheter with distal tip terminating in the right atrium.

 

IMPRESSION: 

Cardiomegaly and mild pulmonary vascular congestion. Correlate for congestive heart failure/volume ov
erload.

## 2023-10-23 NOTE — P.HPIM
History of Present Illness


H&P Date: 10/23/23


Chief Complaint: Near-syncope





63-year-old patient, follows with Dr. Jacobson.


Patient has known end-stage kidney disease on hemodialysis Monday Wednesday and 

Friday.


Patient is rather lethargic.  Unable to give a full history.  Presented to the 

ER with near-syncopal episode.  This happened while patient is going to 

dialysis.  Patient felt tired and fell to the floor.  Patient keeps dozing off 

while giving the history.





Review of systems cannot be obtained as patient lethargic





Past medical history to include:


Chronic kidney disease minimal bone disease, anemia of chronic kidney disease, 

gastritis, carotid artery stenosis, on hemodialysis Monday rest and Friday COPD





Social history:


Patient lethargic unable to obtain





Physical examination:


VITAL SIGNS: 97.8, 62, 18, 122/53, 100% room air


GENERAL: BMI 35, laying in bed lethargic does open her eyes then dozes off.


EYES: [Pupils equal.  Conjunctiva pale l.


HEENT: External appearance of nose and ears normal, oral cavity grossly normal.


NECK: JVD unable to assess; masses not palpable.


HEART: First and second heart sounds are normal;  no edema.  


LUNGS: Respiratory rate normal; decreased breath sounds.  


ABDOMEN: Soft,  nontender, liver spleen not palpable, no masses palpable.  


PSYCH: [Lethargic.  Able to answer questions l.  


MUSCULOSKELETAL:No Clubbing/cyanosis;muscles-grossly intact


NEUROLOGICAL: Cranial nerves grossly intact; no facial asymmetry,  does move all

 limbs.  Pathology report arousable. 


LYMPHATICS: No lymph nodes palpable in the axilla and neck





INVESTIGATIONS, reviewed in the clinical context:


October 23: White count 13.2 hemoglobin 10.5 platelets 111 sodium 131 potassium 

7.3 BUN 43 creatinine 6.41.  Potassium 5.9 TSH 1.0


EKG tracing personally reviewed by me-normal sinus rhythm.  Peak T waves.


CT brain: Chronic changes





Recent investigations [August 2023]


CT abdomen pelvis: Small bilateral kidneys.


Brain MRI: Numerous bilateral focal areas of abnormal signal white matters 

nonspecific.  Left frontal soft tissue subcutaneous hematoma stable.


2-D echocardiogram: EF 50-55%.  Moderate MR.


Carotid Doppler: Moderate approaching severe stenosis right internal carotid 

artery 69%.  50-69% left internal carotid artery.  Left thyroid nodule 1 cm.


EEG: No epileptiform activity.  Evidence of encephalopathy.


Transferrin 200 ferritin 01/08/2009 TIBC 28T percent saturation 22.5 iron 57








Assessment and plan: 





-Acute metabolic encephalopathy with delirium, probably multifactorial.


Patient is due for dialysis today.  Also on Neurontin the setting of renal 

failure.


For now we'll hold off Bentyl, Neurontin, Claritin,-which could all be 

contributing.





-Chronic kidney disease minimal bone disease


On Renvela





-Anemia of chronic kidney disease.





-Gastritis


PPI





-Carotid artery stenosis: Moderate approaching severe stenosis right internal 

carotid artery 69%.  50-69% left internal carotid artery. 


Follow-up with Dr. Armin reynaga





-Severe hyperkalemia secondary to renal failure.


Received cocktail for the same.  For dialysis today.


.


-End-stage renal disease(Monday Wednesday and Friday,


Nephrology consulted for hemodialysis today.


.





-COPD,


Symbicort.  Albuterol when necessary.  Singulair





-Full code





For hemodialysis today.  Telemetry.  Hold Bentyl Neurontin Claritin.  Other 

medications resumed.  Nephrology consulted.





Past Medical History


Past Medical History: Heart Failure, COPD, CVA/TIA, Dialysis, GERD/Reflux, 

Thyroid Disorder


History of Any Multi-Drug Resistant Organisms: Unobtainable


Additional Past Surgical History / Comment(s): Dialysis cath placement


Past Anesthesia/Blood Transfusion Reactions: No Reported Reaction


Past Psychological History: No Psychological Hx Reported


Smoking Status: Unknown if ever smoked


Past Alcohol Use History: Unable to Obtain


Past Drug Use History: Unable to Obtain





Medications and Allergies


                                Home Medications











 Medication  Instructions  Recorded  Confirmed  Type


 


Albuterol Sulfate [Albuterol 1 puff INHALATION RT-Q4H PRN 08/14/23 10/23/23 

History





Sulfate Hfa]    


 


Atorvastatin Calcium 20 mg PO DAILY 08/14/23 10/23/23 History


 


Benzonatate [Tessalon Perles] 100 - 200 mg PO TID PRN 08/14/23 10/23/23 History


 


Budesonide/Formoterol Fumarate 2 puff INHALATION RT-BID 08/14/23 10/23/23 

History





[Symbicort 160-4.5 Mcg Inhaler]    


 


Dicyclomine [Bentyl] 10 mg PO TID 08/14/23 10/23/23 History


 


Fluticasone Nasal Spray [Flonase 2 spray EA NOSTRIL DAILY 08/14/23 10/23/23 

History





Nasal Spray]    


 


Isosorbide Mononitrate ER [Imdur] 30 mg PO DAILY 08/14/23 10/23/23 History


 


Loperamide [Imodium] 4 mg PO QID PRN 08/14/23 10/23/23 History


 


Metoprolol Succinate (ER) [Toprol 50 mg PO HS 08/14/23 10/23/23 History





XL]    


 


Montelukast [Singulair] 10 mg PO DAILY 08/14/23 10/23/23 History


 


Omeprazole [PriLOSEC] 20 mg PO AC-SUPPER 08/14/23 10/23/23 History


 


Ondansetron Odt [Zofran ODT] 4 mg PO Q8HR PRN 08/14/23 10/23/23 History


 


SILVER sulfADIAZINE Cream 1 applic TOPICAL BID 08/14/23 10/23/23 History





[Silvadene 1% Cream]    


 


Ascorbic Acid [Vitamin C] 500 mg PO DAILY 08/15/23 10/23/23 History


 


Aspirin EC [Ecotrin Low Dose] 81 mg PO DAILY 08/15/23 10/23/23 History


 


Bismuth Subsalicylate 525 - 1,050 mg PO TID PRN 08/15/23 10/23/23 History





[Pepto-Bismol Ultra]    


 


Cholecalciferol (Vitamin D3) 125 mcg PO DAILY 08/15/23 10/23/23 History





[Vitamin D3 (125 MCG = 5,000 IU)]    


 


Cranberry 15,000 Mg Tab 15,000 mg PO DAILY 08/15/23 10/23/23 History


 


Melatonin/Pyridoxine HCl (B6) 1 tab PO HS PRN 08/15/23 10/23/23 History





[Melatonin-Vit B6  5-10 mg Tab]    


 


Multivit-Min/FA/Lycopen/Lutein 1 tab PO DAILY 08/15/23 10/23/23 History





[Centrum Silver Tablet]    


 


Propylene Glycol [Systane Complete] 1 drop BOTH EYES HS 08/15/23 10/23/23 

History


 


Super B Complex 1 tab PO DAILY 08/15/23 10/23/23 History


 


Vitamin E (Dl,Tocopheryl Acet) 400 unit PO DAILY 08/15/23 10/23/23 History





[Vitamin E (400 Iu = 180 mg)]    


 


Bumetanide [BUMEX] 4 mg PO BID 10/23/23 10/23/23 History


 


Gabapentin [Neurontin] 200 mg PO BID 10/23/23 10/23/23 History


 


Loratadine [Claritin] 10 mg PO DAILY 10/23/23 10/23/23 History


 


Midodrine [ProAmatine] 10 mg PO AS DIRECTED PRN 10/23/23 10/23/23 History


 


Patiromer Calcium Sorbitex 1 packet PO DAILY 10/23/23 10/23/23 History





[Veltassa]    


 


Sevelamer Carbonate 1,600 mg PO QID 10/23/23 10/23/23 History


 


amLODIPine [Norvasc] 10 mg PO DAILY 10/23/23 10/23/23 History


 


hydrALAZINE HCL [Apresoline] 50 mg PO AS DIRECTED 10/23/23 10/23/23 History








                                    Allergies











Allergy/AdvReac Type Severity Reaction Status Date / Time


 


erythromycin base Allergy  Unknown Verified 10/23/23 10:47


 


furosemide [From Lasix] Allergy  Unknown Verified 10/23/23 10:47


 


green tea Allergy  Unknown Verified 10/23/23 10:47


 


Penicillins Allergy  Unknown Verified 10/23/23 10:47


 


strawberry Allergy  Unknown Verified 10/23/23 10:47


 


Sulfa (Sulfonamide Allergy  Unknown Verified 10/23/23 10:47





Antibiotics)     


 


sulfacetamide Allergy  Unknown Verified 10/23/23 10:47














Physical Exam


Vitals: 


                                   Vital Signs











  Temp Pulse Resp BP Pulse Ox


 


 10/23/23 11:44   61   


 


 10/23/23 11:27   62  18  122/53  100


 


 10/23/23 11:21   62   


 


 10/23/23 09:14   52 L  14  135/61  100


 


 10/23/23 07:35  97.8 F  60  16  129/67  100








                                Intake and Output











 10/22/23 10/23/23 10/23/23





 22:59 06:59 14:59


 


Other:   


 


  Weight   83.915 kg














Results


CBC & Chem 7: 


                                 10/23/23 09:54





                                 10/23/23 13:22


Labs: 


                  Abnormal Lab Results - Last 24 Hours (Table)











  10/23/23 10/23/23 10/23/23 Range/Units





  08:03 08:03 09:54 


 


WBC    13.2 H  (3.8-10.6)  k/uL


 


RBC    3.28 L  (3.80-5.40)  m/uL


 


Hgb    10.5 L  (11.4-16.0)  gm/dL


 


Hct    31.6 L  (34.0-46.0)  %


 


RDW    17.2 H  (11.5-15.5)  %


 


Plt Count    111 L  (150-450)  k/uL


 


Neutrophils #    11.1 H  (1.3-7.7)  k/uL


 


INR  1.2 H    (<1.2)  


 


Sodium   131 L   (137-145)  mmol/L


 


Potassium   7.3 H*   (3.5-5.1)  mmol/L


 


Chloride   92 L   ()  mmol/L


 


BUN   43 H   (7-17)  mg/dL


 


Creatinine   6.41 H   (0.52-1.04)  mg/dL


 


Magnesium   3.2 H   (1.6-2.3)  mg/dL


 


Total Protein   5.9 L   (6.3-8.2)  g/dL














  10/23/23 Range/Units





  13:22 


 


WBC   (3.8-10.6)  k/uL


 


RBC   (3.80-5.40)  m/uL


 


Hgb   (11.4-16.0)  gm/dL


 


Hct   (34.0-46.0)  %


 


RDW   (11.5-15.5)  %


 


Plt Count   (150-450)  k/uL


 


Neutrophils #   (1.3-7.7)  k/uL


 


INR   (<1.2)  


 


Sodium   (137-145)  mmol/L


 


Potassium  5.9 H  (3.5-5.1)  mmol/L


 


Chloride   ()  mmol/L


 


BUN   (7-17)  mg/dL


 


Creatinine   (0.52-1.04)  mg/dL


 


Magnesium   (1.6-2.3)  mg/dL


 


Total Protein   (6.3-8.2)  g/dL

## 2023-10-24 LAB
ALBUMIN SERPL-MCNC: 3.3 G/DL (ref 3.5–5)
ALP SERPL-CCNC: 87 U/L (ref 38–126)
ALT SERPL-CCNC: 15 U/L (ref 4–34)
ANION GAP SERPL CALC-SCNC: 11 MMOL/L
AST SERPL-CCNC: 22 U/L (ref 14–36)
BASOPHILS # BLD AUTO: 0 K/UL (ref 0–0.2)
BASOPHILS NFR BLD AUTO: 0 %
BUN SERPL-SCNC: 29 MG/DL (ref 7–17)
CALCIUM SPEC-MCNC: 8.5 MG/DL (ref 8.4–10.2)
CHLORIDE SERPL-SCNC: 98 MMOL/L (ref 98–107)
CO2 SERPL-SCNC: 26 MMOL/L (ref 22–30)
EOSINOPHIL # BLD AUTO: 0.2 K/UL (ref 0–0.7)
EOSINOPHIL NFR BLD AUTO: 2 %
ERYTHROCYTE [DISTWIDTH] IN BLOOD BY AUTOMATED COUNT: 3.12 M/UL (ref 3.8–5.4)
ERYTHROCYTE [DISTWIDTH] IN BLOOD: 16.4 % (ref 11.5–15.5)
GLUCOSE SERPL-MCNC: 89 MG/DL (ref 74–99)
HCT VFR BLD AUTO: 30.4 % (ref 34–46)
HGB BLD-MCNC: 10 GM/DL (ref 11.4–16)
LYMPHOCYTES # SPEC AUTO: 1.2 K/UL (ref 1–4.8)
LYMPHOCYTES NFR SPEC AUTO: 13 %
MAGNESIUM SPEC-SCNC: 2.5 MG/DL (ref 1.6–2.3)
MCH RBC QN AUTO: 31.9 PG (ref 25–35)
MCHC RBC AUTO-ENTMCNC: 32.8 G/DL (ref 31–37)
MCV RBC AUTO: 97.2 FL (ref 80–100)
MONOCYTES # BLD AUTO: 0.7 K/UL (ref 0–1)
MONOCYTES NFR BLD AUTO: 7 %
NEUTROPHILS # BLD AUTO: 6.9 K/UL (ref 1.3–7.7)
NEUTROPHILS NFR BLD AUTO: 76 %
PLATELET # BLD AUTO: 129 K/UL (ref 150–450)
POTASSIUM SERPL-SCNC: 5.3 MMOL/L (ref 3.5–5.1)
PROT SERPL-MCNC: 5.5 G/DL (ref 6.3–8.2)
SODIUM SERPL-SCNC: 135 MMOL/L (ref 137–145)
WBC # BLD AUTO: 9.1 K/UL (ref 3.8–10.6)

## 2023-10-24 RX ADMIN — ATORVASTATIN CALCIUM SCH: 20 TABLET, FILM COATED ORAL at 10:54

## 2023-10-24 RX ADMIN — MONTELUKAST SODIUM SCH MG: 10 TABLET, FILM COATED ORAL at 10:20

## 2023-10-24 RX ADMIN — ACETAMINOPHEN PRN MG: 500 TABLET ORAL at 22:15

## 2023-10-24 RX ADMIN — BUDESONIDE AND FORMOTEROL FUMARATE DIHYDRATE SCH PUFF: 160; 4.5 AEROSOL RESPIRATORY (INHALATION) at 21:06

## 2023-10-24 RX ADMIN — GABAPENTIN SCH MG: 100 CAPSULE ORAL at 20:23

## 2023-10-24 RX ADMIN — PANTOPRAZOLE SODIUM SCH MG: 40 TABLET, DELAYED RELEASE ORAL at 16:51

## 2023-10-24 RX ADMIN — MONTELUKAST SODIUM SCH MG: 10 TABLET, FILM COATED ORAL at 10:21

## 2023-10-24 RX ADMIN — OXYCODONE HYDROCHLORIDE AND ACETAMINOPHEN SCH MG: 500 TABLET ORAL at 10:22

## 2023-10-24 RX ADMIN — SEVELAMER CARBONATE SCH MG: 800 TABLET, FILM COATED ORAL at 12:13

## 2023-10-24 RX ADMIN — SEVELAMER CARBONATE SCH MG: 800 TABLET, FILM COATED ORAL at 08:06

## 2023-10-24 RX ADMIN — OXYCODONE HYDROCHLORIDE AND ACETAMINOPHEN SCH MG: 500 TABLET ORAL at 10:16

## 2023-10-24 RX ADMIN — ISOSORBIDE MONONITRATE SCH MG: 30 TABLET, EXTENDED RELEASE ORAL at 10:22

## 2023-10-24 RX ADMIN — BUMETANIDE SCH MG: 1 TABLET ORAL at 10:25

## 2023-10-24 RX ADMIN — GABAPENTIN SCH MG: 100 CAPSULE ORAL at 12:13

## 2023-10-24 RX ADMIN — BUMETANIDE SCH MG: 1 TABLET ORAL at 20:23

## 2023-10-24 RX ADMIN — SEVELAMER CARBONATE SCH MG: 800 TABLET, FILM COATED ORAL at 16:51

## 2023-10-24 RX ADMIN — ACETAMINOPHEN PRN MG: 500 TABLET ORAL at 10:42

## 2023-10-24 RX ADMIN — METOPROLOL SUCCINATE SCH MG: 50 TABLET, EXTENDED RELEASE ORAL at 20:23

## 2023-10-24 RX ADMIN — ASPIRIN 81 MG CHEWABLE TABLET SCH MG: 81 TABLET CHEWABLE at 10:19

## 2023-10-24 RX ADMIN — ALBUTEROL SULFATE PRN PUFF: 90 AEROSOL, METERED RESPIRATORY (INHALATION) at 21:06

## 2023-10-24 RX ADMIN — ASPIRIN 81 MG CHEWABLE TABLET SCH MG: 81 TABLET CHEWABLE at 10:24

## 2023-10-24 RX ADMIN — DEXTRAN 70 AND HYPROMELLOSE 2910 SCH DROPS: 1; 3 SOLUTION/ DROPS OPHTHALMIC at 21:11

## 2023-10-24 RX ADMIN — ASPIRIN SCH: 325 TABLET ORAL at 10:47

## 2023-10-24 RX ADMIN — BUDESONIDE AND FORMOTEROL FUMARATE DIHYDRATE SCH: 160; 4.5 AEROSOL RESPIRATORY (INHALATION) at 10:37

## 2023-10-24 NOTE — P.PN
Progress Note - Text


Progress Note Date: 10/24/23





Chief Complaint: Near-syncope





63-year-old patient, follows with Dr. Jacobson.


Patient has known end-stage kidney disease on hemodialysis Monday Wednesday and 

Friday.


Patient is rather lethargic.  Unable to give a full history.  Presented to the 

ER with near-syncopal episode.  This happened while patient is going to 

dialysis.  Patient felt tired and fell to the floor.  Patient keeps dozing off 

while giving the history.


October 24: Patient tired but not delirious anymore.  Was hemodialyzed 

yesterday.  Complaining of lower extremity pain.  We'll start Neurontin at half 

the dose at 100 mg twice a day.  Ace wrap lower extremity.  Patient will be 

dialyzed tomorrow.  We'll see how patient does at least for 24 hours.  Being 

followed by nephrology.  Eating well.





Active Medications





Acetaminophen (Acetaminophen Tab 500 Mg Tab)  500 mg PO Q6HR PRN


   PRN Reason: Fever and/ or Pain


   Last Admin: 10/24/23 10:42 Dose:  500 mg


   


Albuterol Sulfate (Albuterol Hfa Inhaler)  1 puff INHALATION RT-Q4H PRN


   PRN Reason: Shortness Of Breath


   Last Admin: 10/23/23 21:41 Dose:  1 puff


   


Amlodipine Besylate (Amlodipine 10 Mg Tab)  10 mg PO DAILY formerly [facility]


   Last Admin: 10/24/23 10:20 Dose:  10 mg


   


Artificial Tears (Artificial Tears-Hypromellose Drops 15 Ml Btl)  1 drops BOTH 

EYES HS formerly [facility]


   Last Admin: 10/23/23 20:42 Dose:  1 drops


   


Ascorbic Acid (Ascorbic Acid 500 Mg Tab)  500 mg PO DAILY formerly [facility]


   Last Admin: 10/24/23 10:22 Dose:  500 mg


   


Aspirin (Aspirin 81 Mg)  81 mg PO DAILY formerly [facility]


   Last Admin: 10/24/23 10:24 Dose:  81 mg


   


Atorvastatin Calcium (Atorvastatin 20 Mg Tab)  20 mg PO DAILY formerly [facility]


   Last Admin: 10/24/23 10:54 Dose:  Not Given


   


Budesonide/Formoterol Fumarate (Symbicort 160-4.5 Mcg Inhaler)  2 puff 

INHALATION RT-BID formerly [facility]


   Last Admin: 10/24/23 10:37 Dose:  Not Given


   


Bumetanide (Bumetanide 1 Mg Tab)  4 mg PO BID formerly [facility]


   Last Admin: 10/24/23 10:25 Dose:  4 mg


   


Gabapentin (Gabapentin 100 Mg Cap)  100 mg PO BID formerly [facility]


   Last Admin: 10/24/23 12:13 Dose:  100 mg


   


Isosorbide Mononitrate (Isosorbide Mononitrate Er 30 Mg Tab.Er.24h)  30 mg PO 

DAILY formerly [facility]


   Last Admin: 10/24/23 10:22 Dose:  30 mg


   


Loperamide HCl (Loperamide 2 Mg Cap)  4 mg PO QID PRN


   PRN Reason: Diarrhea


Metoprolol Succinate (Metoprolol Succinate (Er) 50 Mg Tab.Er.24h)  50 mg PO HS 

formerly [facility]


   Last Admin: 10/23/23 20:43 Dose:  50 mg


   


Montelukast Sodium (Montelukast 10 Mg Tab)  10 mg PO DAILY formerly [facility]


   Last Admin: 10/24/23 10:21 Dose:  10 mg


   


Naloxone HCl (Naloxone 0.4 Mg/Ml 1 Ml Vial)  0.2 mg IV Q2M PRN


   PRN Reason: Opioid Reversal


Non-Formulary Medication (Melatonin/Pyridoxine Hcl (B6) [Melatonin-Vit B6  5-10 

Mg Tab])  1 tab PO HS PRN


   PRN Reason: SLEEP


Non-Formulary Medication (Patiromer Calcium Sorbitex [Veltassa])  1 packet PO 

DAILY formerly [facility]


   Last Admin: 10/24/23 10:47 Dose:  Not Given


   


Ondansetron HCl (Ondansetron Odt 4 Mg Tab)  4 mg PO Q8HR PRN


   PRN Reason: Nausea


Pantoprazole Sodium (Pantoprazole 40 Mg Tablet)  40 mg PO AC-SUPPER formerly [facility]


   Last Admin: 10/23/23 17:00 Dose:  40 mg


   


Sevelamer Carbonate (Sevelamer 800 Mg Tab)  1,600 mg PO TID-W/MEALS formerly [facility]


   Last Admin: 10/24/23 12:13 Dose:  1,600 mg


   


Silver Sulfadiazine (Silver Sulfadiazine 1% Cream 25 Gm Tube)  1 applic TOPICAL 

BID formerly [facility]


   Last Admin: 10/24/23 10:49 Dose:  1 applic


   











Past medical history to include:


Chronic kidney disease minimal bone disease, anemia of chronic kidney disease, 

gastritis, carotid artery stenosis, on hemodialysis Monday rest and Friday COPD





Social history:


Patient lethargic unable to obtain





Physical examination:


VITAL SIGNS: 98.2, 65, 18, 99/59, 95% room air


GENERAL: Laying in bed, tired but able to answer questions


EYES: [Pupils equal.  Conjunctiva pale .


HEENT: External appearance of nose and ears normal, oral cavity grossly normal.


NECK: JVD unable to assess; masses not palpable.


HEART: First and second heart sounds are normal;  no edema.  


LUNGS: Respiratory rate normal; decreased breath sounds.  


ABDOMEN: Soft,  nontender, liver spleen not palpable, no masses palpable.  


PSYCH: Answering questions appropriately.


MUSCULOSKELETAL:No Clubbing/cyanosis;muscles-grossly intact


NEUROLOGICAL: Cranial nerves grossly intact; no facial asymmetry, moving all 4 

limbs








INVESTIGATIONS, reviewed in the clinical context:


Hepatitis B surface antigen: Nonreactive hepatitis B surface antibody A.  

Quantitative 1000


October 24: White count 9.1 hemoglobin 10 platelets 1295.3 BUN 29 creatinine 

4.46 magnesium 2.5


October 23: White count 13.2 hemoglobin 10.5 platelets 111 sodium 131 potassium 

7.3 BUN 43 creatinine 6.41.  Potassium 5.9 TSH 1.0


EKG tracing personally reviewed by me-normal sinus rhythm.  Peak T waves.


CT brain: Chronic changes





Recent investigations [August 2023]


CT abdomen pelvis: Small bilateral kidneys.


Brain MRI: Numerous bilateral focal areas of abnormal signal white matters 

nonspecific.  Left frontal soft tissue subcutaneous hematoma stable.


2-D echocardiogram: EF 50-55%.  Moderate MR.


Carotid Doppler: Moderate approaching severe stenosis right internal carotid 

artery 69%.  50-69% left internal carotid artery.  Left thyroid nodule 1 cm.


EEG: No epileptiform activity.  Evidence of encephalopathy.


Transferrin 200 ferritin 01/08/2009 TIBC 28T percent saturation 22.5 iron 57








Assessment and plan: 





-Acute metabolic encephalopathy with delirium, probably multifactorial.:  Better


Better with dialysis


For now we'll hold off Bentyl, , Claritin,-which could all be contributing.


Neurontin to be resumed at a smaller dose





-Chronic kidney disease minimal bone disease


On Renvela





-Anemia of chronic kidney disease.





-Gastritis


PPI





-Carotid artery stenosis: Moderate approaching severe stenosis right internal 

carotid artery 69%.  50-69% left internal carotid artery. 


Follow-up with Dr. Armin reynaga





-Severe hyperkalemia secondary to renal failure.


Received cocktail for the same.  For dialysis today.


.


-End-stage renal disease(Monday Wednesday and Friday,


Nephrology consulted for hemodialysis today.





-Peripheral neuropathy


Because of renal failure started Neurontin on half the dose.  100 mg twice a day


.





-COPD,


Symbicort.  Albuterol when necessary.  Singulair





-Full code





We'll see how patient does today.  Neurontin being treated resumed at half the 

dose.  Ace wrap lower extremity.  Hemodialysis tomorrow.  Probably discharge if 

doing well.

## 2023-10-24 NOTE — US
EXAMINATION TYPE: US venous doppler duplex LE 

 

DATE OF EXAM: 10/24/2023 2:17 PM

 

COMPARISON: NONE

 

CLINICAL INDICATION: Female, 63 years old with history of Pain in L. leg, erythema; Pain

 

SIDE PERFORMED: Bilateral  

 

TECHNIQUE:  The lower extremity deep venous system is examined utilizing real time linear array sonog
gini with graded compression, doppler sonography and color-flow sonography.

 

VESSELS IMAGED:

Common Femoral Vein

Deep Femoral Vein

Greater Saphenous Vein *

Femoral Vein

Popliteal Vein

Small Saphenous Vein *

Proximal Calf Veins

(* superficial vessels)

 

 

 

Right Leg:  Negative for DVT

 

Left Leg:  Negative for DVT

 

 

 

IMPRESSION:  Grayscale, color doppler, spectral doppler imaging performed of the deep veins of the lo
wer extremities.  There is normal flow, compressibility, vascular waveforms.

## 2023-10-24 NOTE — P.NPCON
History of Present Illness





- Reason for Consult


end stage renal disease





- History of Present Illness





Patient is a 63-year-old female with end-stage renal disease on hemodialysis on 

a Monday Wednesday Friday schedule.  Patient was admitted to the hospital 

yesterday after she fell at home while coming to hemodialysis.  Patient stated 

that she felt weak.  It is unclear as to what her blood pressure was at that 

time.  No hypotension documented in the hospital records here.





Potassium was elevated at 7.3 and chest x-ray showed volume overload.  Patient 

was dialyzed yesterday with UF of 3L.





Patient states she is feeling better.  Complaining of pain in the legs.


No history of fever chills nausea vomiting or abdominal pain or diarrhea.





Review of Systems





As per HPI





Past Medical History


Past Medical History: Heart Failure, COPD, CVA/TIA, Dialysis, GERD/Reflux, 

Thyroid Disorder


Additional Past Medical History / Comment(s): CKD, endometriosis,


History of Any Multi-Drug Resistant Organisms: Unobtainable


Additional Past Surgical History / Comment(s): Dialysis cath placement


Past Anesthesia/Blood Transfusion Reactions: No Reported Reaction


Past Psychological History: No Psychological Hx Reported


Smoking Status: Unknown if ever smoked


Past Alcohol Use History: Unable to Obtain


Past Drug Use History: Unable to Obtain





Medications and Allergies


                                Home Medications











 Medication  Instructions  Recorded  Confirmed  Type


 


Albuterol Sulfate [Albuterol 1 puff INHALATION RT-Q4H PRN 08/14/23 10/23/23 

History





Sulfate Hfa]    


 


Atorvastatin Calcium 20 mg PO DAILY 08/14/23 10/23/23 History


 


Benzonatate [Tessalon Perles] 100 - 200 mg PO TID PRN 08/14/23 10/23/23 History


 


Budesonide/Formoterol Fumarate 2 puff INHALATION RT-BID 08/14/23 10/23/23 

History





[Symbicort 160-4.5 Mcg Inhaler]    


 


Dicyclomine [Bentyl] 10 mg PO TID 08/14/23 10/23/23 History


 


Fluticasone Nasal Spray [Flonase 2 spray EA NOSTRIL DAILY 08/14/23 10/23/23 

History





Nasal Spray]    


 


Isosorbide Mononitrate ER [Imdur] 30 mg PO DAILY 08/14/23 10/23/23 History


 


Loperamide [Imodium] 4 mg PO QID PRN 08/14/23 10/23/23 History


 


Metoprolol Succinate (ER) [Toprol 50 mg PO HS 08/14/23 10/23/23 History





XL]    


 


Montelukast [Singulair] 10 mg PO DAILY 08/14/23 10/23/23 History


 


Omeprazole [PriLOSEC] 20 mg PO AC-SUPPER 08/14/23 10/23/23 History


 


Ondansetron Odt [Zofran ODT] 4 mg PO Q8HR PRN 08/14/23 10/23/23 History


 


SILVER sulfADIAZINE Cream 1 applic TOPICAL BID 08/14/23 10/23/23 History





[Silvadene 1% Cream]    


 


Ascorbic Acid [Vitamin C] 500 mg PO DAILY 08/15/23 10/23/23 History


 


Aspirin EC [Ecotrin Low Dose] 81 mg PO DAILY 08/15/23 10/23/23 History


 


Bismuth Subsalicylate 525 - 1,050 mg PO TID PRN 08/15/23 10/23/23 History





[Pepto-Bismol Ultra]    


 


Cholecalciferol (Vitamin D3) 125 mcg PO DAILY 08/15/23 10/23/23 History





[Vitamin D3 (125 MCG = 5,000 IU)]    


 


Cranberry 15,000 Mg Tab 15,000 mg PO DAILY 08/15/23 10/23/23 History


 


Melatonin/Pyridoxine HCl (B6) 1 tab PO HS PRN 08/15/23 10/23/23 History





[Melatonin-Vit B6  5-10 mg Tab]    


 


Multivit-Min/FA/Lycopen/Lutein 1 tab PO DAILY 08/15/23 10/23/23 History





[Centrum Silver Tablet]    


 


Propylene Glycol [Systane Complete] 1 drop BOTH EYES HS 08/15/23 10/23/23 

History


 


Super B Complex 1 tab PO DAILY 08/15/23 10/23/23 History


 


Vitamin E (Dl,Tocopheryl Acet) 400 unit PO DAILY 08/15/23 10/23/23 History





[Vitamin E (400 Iu = 180 mg)]    


 


Bumetanide [BUMEX] 4 mg PO BID 10/23/23 10/23/23 History


 


Gabapentin [Neurontin] 200 mg PO BID 10/23/23 10/23/23 History


 


Loratadine [Claritin] 10 mg PO DAILY 10/23/23 10/23/23 History


 


Midodrine [ProAmatine] 10 mg PO AS DIRECTED PRN 10/23/23 10/23/23 History


 


Patiromer Calcium Sorbitex 1 packet PO DAILY 10/23/23 10/23/23 History





[Veltassa]    


 


Sevelamer Carbonate 1,600 mg PO QID 10/23/23 10/23/23 History


 


amLODIPine [Norvasc] 10 mg PO DAILY 10/23/23 10/23/23 History


 


hydrALAZINE HCL [Apresoline] 50 mg PO AS DIRECTED 10/23/23 10/23/23 History








                                    Allergies











Allergy/AdvReac Type Severity Reaction Status Date / Time


 


erythromycin base Allergy  Unknown Verified 10/23/23 10:47


 


furosemide [From Lasix] Allergy  Unknown Verified 10/23/23 10:47


 


green tea Allergy  Unknown Verified 10/23/23 10:47


 


Penicillins Allergy  Unknown Verified 10/23/23 10:47


 


strawberry Allergy  Unknown Verified 10/23/23 10:47


 


Sulfa (Sulfonamide Allergy  Unknown Verified 10/23/23 10:47





Antibiotics)     


 


sulfacetamide Allergy  Unknown Verified 10/23/23 10:47














Physical Exam


Vitals: 


                                   Vital Signs











  Temp Pulse Pulse Resp BP BP Pulse Ox


 


 10/24/23 11:30  98.2 F   65  18   99/59  94 L


 


 10/24/23 08:00  98.3 F   72  17   119/65  94 L


 


 10/24/23 04:00  98.3 F   67  17   102/66  97


 


 10/23/23 23:09  98.4 F   71  17   100/64  98


 


 10/23/23 20:20  98.4 F   79  18   119/71  98


 


 10/23/23 18:36  98.3 F   67  17   127/67  97


 


 10/23/23 18:26  98.3 F   67  18   127/67  97


 


 10/23/23 17:57  97.6 F  75   20  110/55   97


 


 10/23/23 17:12  97.1 F L   58 L  16   138/90 


 


 10/23/23 16:59   63   18  138/83   95


 


 10/23/23 15:09   50 L   18  116/60  


 


 10/23/23 11:44   61     








                                Intake and Output











 10/23/23 10/24/23 10/24/23





 22:59 06:59 14:59


 


Intake Total 410  118


 


Output Total 3400 100 500


 


Balance -2990 -100 -382


 


Intake:   


 


  IV 10  


 


    0.9 10  


 


  Oral   118


 


  Hemodialysis 400  


 


Output:   


 


  Urine 400 100 500


 


  Hemodialysis 3000  


 


Other:   


 


  Voiding Method   Toilet


 


  # Voids  1 1


 


  # Bowel Movements 1  1


 


  Weight 74.5 kg 78.5 kg 














Patient is awake, comfortable, alert oriented 3


No acute distress


Examination of the heart S1 and S2


Examination of the lungs bilateral breath sounds are heard


Abdomen is soft nontender


Examination of lower extremities shows 1+ edema bilaterally


CNS exam grossly intact





Results





- Lab Results


                             Most recent lab results











Calcium  8.5 mg/dL (8.4-10.2)   10/24/23  08:40    


 


Phosphorus  4.8 mg/dL (2.5-4.5)  H  10/24/23  08:40    


 


Magnesium  2.5 mg/dL (1.6-2.3)  H  10/24/23  08:40    














                                 10/24/23 08:40





                                 10/24/23 08:40





Assessment and Plan


Assessment: 





1.  End-stage renal disease on hemodialysis on a Monday Wednesday Friday 

schedule


2.  Volume overload currently improved


3.  Hyperkalemia with history of chronic hyperkalemia maintained on Veltassa as 

outpatient


4.  CK D mineral bone disorder


5.  Status post fall, near syncope 


Plan: 





Hemodialysis in a.m.


May need to restart Neurontin at a lower dose as patient is complaining of 

significant pain in the legs


Continue with veltassa as outpatient

## 2023-10-25 VITALS — HEART RATE: 63 BPM

## 2023-10-25 VITALS — SYSTOLIC BLOOD PRESSURE: 156 MMHG | TEMPERATURE: 98 F | DIASTOLIC BLOOD PRESSURE: 74 MMHG | RESPIRATION RATE: 18 BRPM

## 2023-10-25 RX ADMIN — ATORVASTATIN CALCIUM SCH MG: 20 TABLET, FILM COATED ORAL at 07:51

## 2023-10-25 RX ADMIN — GABAPENTIN SCH MG: 100 CAPSULE ORAL at 07:51

## 2023-10-25 RX ADMIN — SEVELAMER CARBONATE SCH MG: 800 TABLET, FILM COATED ORAL at 06:46

## 2023-10-25 RX ADMIN — ALBUTEROL SULFATE PRN PUFF: 90 AEROSOL, METERED RESPIRATORY (INHALATION) at 09:23

## 2023-10-25 RX ADMIN — BUMETANIDE SCH MG: 1 TABLET ORAL at 07:50

## 2023-10-25 RX ADMIN — SEVELAMER CARBONATE SCH MG: 800 TABLET, FILM COATED ORAL at 12:20

## 2023-10-25 RX ADMIN — ASPIRIN SCH: 325 TABLET ORAL at 11:08

## 2023-10-25 RX ADMIN — BUDESONIDE AND FORMOTEROL FUMARATE DIHYDRATE SCH PUFF: 160; 4.5 AEROSOL RESPIRATORY (INHALATION) at 09:23

## 2023-10-25 RX ADMIN — ISOSORBIDE MONONITRATE SCH MG: 30 TABLET, EXTENDED RELEASE ORAL at 07:51

## 2023-10-25 NOTE — P.DS
Providers


Date of admission: 


10/23/23 11:21





Expected date of discharge: 10/25/23


Attending physician: 


Melecio Navarrete





Consults: 





                                        





10/23/23 11:21


Consult Physician Urgent 


   Consulting Provider: Sweetie Bone


   Consult Reason/Comments: Hyperkalemia, CRF


   Do you want consulting provider notified?: Already Contacted











Primary care physician: 


Isaiah Jacobson





[facility] Course: 





Chief Complaint: Near-syncope





63-year-old patient, follows with Dr. Jacobson.


Patient has known end-stage kidney disease on hemodialysis Monday Wednesday and 

Friday.


Patient is rather lethargic.  Unable to give a full history.  Presented to the 

ER with near-syncopal episode.  This happened while patient is going to 

dialysis.  Patient felt tired and fell to the floor.  Patient keeps dozing off 

while giving the history.


October 24: Patient tired but not delirious anymore.  Was hemodialyzed 

yesterday.  Complaining of lower extremity pain.  We'll start Neurontin at half 

the dose at 100 mg twice a day.  Ace wrap lower extremity.  Patient will be 

dialyzed tomorrow.  We'll see how patient does at least for 24 hours.  Being 

followed by nephrology.  Eating well.


October 25: Doing well.  On extreme pain well controlled.  Getting dialyzed 

today.  Discussed with patient.

















Past medical history to include:


Chronic kidney disease minimal bone disease, anemia of chronic kidney disease, 

gastritis, carotid artery stenosis, on hemodialysis Monday rest and Friday COPD





Social history:


Patient lethargic unable to obtain





Physical examination:


VITAL SIGNS: 98, 63, 16, 1:30/70, 98% room air


GENERAL: Laying in bed, comfortable


EYES: [Pupils equal.  Conjunctiva pale .


HEENT: External appearance of nose and ears normal, oral cavity grossly normal.


NECK: JVD unable to assess; masses not palpable.


HEART: First and second heart sounds are normal;  no edema.  


LUNGS: Respiratory rate normal; decreased breath sounds.  


ABDOMEN: Soft,  nontender, liver spleen not palpable, no masses palpable.  


PSYCH: AO 3, mood affect normal


MUSCULOSKELETAL:No Clubbing/cyanosis;muscles-grossly intact


NEUROLOGICAL: Cranial nerves grossly intact; no facial asymmetry, moving all 4 

limbs








INVESTIGATIONS, reviewed in the clinical context:


Hepatitis B surface antigen: Nonreactive hepatitis B surface antibody A.  

Quantitative 1000


October 24: White count 9.1 hemoglobin 10 platelets 1295.3 BUN 29 creatinine 

4.46 magnesium 2.5


October 23: White count 13.2 hemoglobin 10.5 platelets 111 sodium 131 potassium 

7.3 BUN 43 creatinine 6.41.  Potassium 5.9 TSH 1.0


EKG tracing personally reviewed by me-normal sinus rhythm.  Peak T waves.


CT brain: Chronic changes





Recent investigations [August 2023]


CT abdomen pelvis: Small bilateral kidneys.


Brain MRI: Numerous bilateral focal areas of abnormal signal white matters 

nonspecific.  Left frontal soft tissue subcutaneous hematoma stable.


2-D echocardiogram: EF 50-55%.  Moderate MR.


Carotid Doppler: Moderate approaching severe stenosis right internal carotid a

rtery 69%.  50-69% left internal carotid artery.  Left thyroid nodule 1 cm.


EEG: No epileptiform activity.  Evidence of encephalopathy.


Transferrin 200 ferritin 01/08/2009 TIBC 28T percent saturation 22.5 iron 57








Assessment and plan: 





-Acute metabolic encephalopathy with delirium, probably multifactorial.:  

Improved


Better with dialysis


Bentyl changed to when necessary.  Claritin discontinued.


Neurontin to be resumed at a half to dose





-Chronic kidney disease minimal bone disease


On Renvela





-Anemia of chronic kidney disease.





-Gastritis


PPI





-Carotid artery stenosis: Moderate approaching severe stenosis right internal 

carotid artery 69%.  50-69% left internal carotid artery. 


Follow-up with Dr. Armin reynaga





-Severe hyperkalemia secondary to renal failure.


Received cocktail for the same.  For dialysis today.


.


-End-stage renal disease(Monday Wednesday and Friday,


Nephrology following





-Peripheral neuropathy


Because of renal failure started Neurontin on half the dose.  100 mg twice a day


.





-COPD,


Symbicort.  Albuterol when necessary.  Singulair





-Full code





Disposition:


Home








Plan - Discharge Summary


New Discharge Prescriptions: 


Continue


   SILVER sulfADIAZINE Cream [Silvadene 1% Cream] 1 applic TOPICAL BID


   Montelukast [Singulair] 10 mg PO DAILY


   Atorvastatin Calcium 20 mg PO DAILY


   Fluticasone Nasal Spray [Flonase Nasal Spray] 2 spray EA NOSTRIL DAILY


   Loperamide [Imodium] 4 mg PO QID PRN


     PRN Reason: Diarrhea


   Budesonide/Formoterol Fumarate [Symbicort 160-4.5 Mcg Inhaler] 2 puff 

INHALATION RT-BID


   Ondansetron Odt [Zofran ODT] 4 mg PO Q8HR PRN


     PRN Reason: Nausea


   Cholecalciferol (Vitamin D3) [Vitamin D3 (125 MCG = 5,000 IU)] 125 mcg PO 

DAILY


   Super B Complex 1 tab PO DAILY


   Ascorbic Acid [Vitamin C] 500 mg PO DAILY


   Vitamin E (Dl,Tocopheryl Acet) [Vitamin E (400 Iu = 180 mg)] 400 unit PO 

DAILY


   Cranberry 15,000 Mg Tab 15,000 mg PO DAILY


   Midodrine [ProAmatine] 10 mg PO AS DIRECTED PRN


     PRN Reason: during dialysis


   amLODIPine [Norvasc] 10 mg PO DAILY


   Sevelamer Carbonate 1,600 mg PO QID


   Albuterol Sulfate [Albuterol Sulfate Hfa] 1 puff INHALATION RT-Q4H PRN


     PRN Reason: Shortness Of Breath


   Omeprazole [PriLOSEC] 20 mg PO AC-SUPPER


   Isosorbide Mononitrate ER [Imdur] 30 mg PO DAILY


   Metoprolol Succinate (ER) [Toprol XL] 50 mg PO HS


   Benzonatate [Tessalon Perles] 100 - 200 mg PO TID PRN


     PRN Reason: Cough


   Propylene Glycol [Systane Complete] 1 drop BOTH EYES HS


   Aspirin EC [Ecotrin Low Dose] 81 mg PO DAILY


   Bismuth Subsalicylate [Pepto-Bismol Ultra] 525 - 1,050 mg PO TID PRN


     PRN Reason: Gi Upset


   Melatonin/Pyridoxine HCl (B6) [Melatonin-Vit B6  5-10 mg Tab] 1 tab PO HS PRN


     PRN Reason: SLEEP


   Multivit-Min/FA/Lycopen/Lutein [Centrum Silver Tablet] 1 tab PO DAILY


   Bumetanide [BUMEX] 4 mg PO BID


   Patiromer Calcium Sorbitex [Veltassa] 1 packet PO DAILY


   hydrALAZINE HCL [Apresoline] 50 mg PO AS DIRECTED





Changed


   Gabapentin [Neurontin] 200 mg PO HS #0


   Dicyclomine [Bentyl] 10 mg PO TID PRN #0


     PRN Reason: Spasms





Discontinued


   Loratadine [Claritin] 10 mg PO DAILY


Discharge Medication List





Albuterol Sulfate [Albuterol Sulfate Hfa] 1 puff INHALATION RT-Q4H PRN 08/14/23 

[History]


Atorvastatin Calcium 20 mg PO DAILY 08/14/23 [History]


Benzonatate [Tessalon Perles] 100 - 200 mg PO TID PRN 08/14/23 [History]


Budesonide/Formoterol Fumarate [Symbicort 160-4.5 Mcg Inhaler] 2 puff INHALATION

RT-BID 08/14/23 [History]


Fluticasone Nasal Spray [Flonase Nasal Spray] 2 spray EA NOSTRIL DAILY 08/14/23 

[History]


Isosorbide Mononitrate ER [Imdur] 30 mg PO DAILY 08/14/23 [History]


Loperamide [Imodium] 4 mg PO QID PRN 08/14/23 [History]


Metoprolol Succinate (ER) [Toprol XL] 50 mg PO HS 08/14/23 [History]


Montelukast [Singulair] 10 mg PO DAILY 08/14/23 [History]


Omeprazole [PriLOSEC] 20 mg PO AC-SUPPER 08/14/23 [History]


Ondansetron Odt [Zofran ODT] 4 mg PO Q8HR PRN 08/14/23 [History]


SILVER sulfADIAZINE Cream [Silvadene 1% Cream] 1 applic TOPICAL BID 08/14/23 

[History]


Ascorbic Acid [Vitamin C] 500 mg PO DAILY 08/15/23 [History]


Aspirin EC [Ecotrin Low Dose] 81 mg PO DAILY 08/15/23 [History]


Bismuth Subsalicylate [Pepto-Bismol Ultra] 525 - 1,050 mg PO TID PRN 08/15/23 

[History]


Cholecalciferol (Vitamin D3) [Vitamin D3 (125 MCG = 5,000 IU)] 125 mcg PO DAILY 

08/15/23 [History]


Cranberry 15,000 Mg Tab 15,000 mg PO DAILY 08/15/23 [History]


Melatonin/Pyridoxine HCl (B6) [Melatonin-Vit B6  5-10 mg Tab] 1 tab PO HS PRN 

08/15/23 [History]


Multivit-Min/FA/Lycopen/Lutein [Centrum Silver Tablet] 1 tab PO DAILY 08/15/23 

[History]


Propylene Glycol [Systane Complete] 1 drop BOTH EYES HS 08/15/23 [History]


Super B Complex 1 tab PO DAILY 08/15/23 [History]


Vitamin E (Dl,Tocopheryl Acet) [Vitamin E (400 Iu = 180 mg)] 400 unit PO DAILY 

08/15/23 [History]


Bumetanide [BUMEX] 4 mg PO BID 10/23/23 [History]


Midodrine [ProAmatine] 10 mg PO AS DIRECTED PRN 10/23/23 [History]


Patiromer Calcium Sorbitex [Veltassa] 1 packet PO DAILY 10/23/23 [History]


Sevelamer Carbonate 1,600 mg PO QID 10/23/23 [History]


amLODIPine [Norvasc] 10 mg PO DAILY 10/23/23 [History]


hydrALAZINE HCL [Apresoline] 50 mg PO AS DIRECTED 10/23/23 [History]


Dicyclomine [Bentyl] 10 mg PO TID PRN #0 10/25/23 [Rx]


Gabapentin [Neurontin] 200 mg PO HS #0 10/25/23 [Rx]








Follow up Appointment(s)/Referral(s): 


[facility] [[facility]-STAFF] - 


Isaiah Jacobson MD [Primary Care Provider] - 1-2 days (Unable to get through at

this time.  Please call to schedule appointment)


Patient Instructions/Handouts:  Hyperkalemia (DC)


Activity/Diet/Wound Care/Special Instructions: 


no new prescriptions


Discharge Disposition: TRANSFER TO SNF/ECF

## 2023-10-25 NOTE — P.PN
Subjective





Patient is seen for f/u for ESRD. Currently seen on HD. Tolerating treatment 

well.





Objective





- Vital Signs


Vital signs: 


                                   Vital Signs











Temp  98.0 F   10/25/23 12:06


 


Pulse  63   10/25/23 11:17


 


Resp  18   10/25/23 12:06


 


BP  156/74   10/25/23 12:06


 


Pulse Ox  98   10/25/23 11:17


 


FiO2  21   10/25/23 09:24








                                 Intake & Output











 10/25/23 10/25/23 10/26/23





 06:59 18:59 06:59


 


Intake Total  540 


 


Output Total 1150 4050 


 


Balance -1150 -3510 


 


Weight 75.3 kg  


 


Intake:   


 


  Oral  240 


 


  Hemodialysis  300 


 


Output:   


 


  Urine 1150 550 


 


  Hemodialysis  3500 


 


Other:   


 


  Voiding Method Toilet Toilet 


 


  # Voids  1 














- Exam





Awke, comfortable


alert and oriented x3


No edema noted.


CNS xam grossly intact





- Labs


CBC & Chem 7: 


                                 10/24/23 08:40





                                 10/24/23 08:40





Assessment and Plan


Assessment: 





1.  End-stage renal disease on hemodialysis on a Monday Wednesday Friday 

schedule


2.  Volume overload currently improved


3.  Hyperkalemia with history of chronic hyperkalemia maintained on Veltassa as 

outpatient


4.  CK D mineral bone disorder


5.  Status post fall, near syncope 


Plan: 





OK to discharge patient post discharge.


Continue with veltassa as outpatient

## 2024-10-09 ENCOUNTER — HOSPITAL ENCOUNTER (INPATIENT)
Dept: HOSPITAL 47 - EC | Age: 64
LOS: 6 days | Discharge: HOME HEALTH | DRG: 689 | End: 2024-10-15
Attending: HOSPITALIST | Admitting: HOSPITALIST
Payer: MEDICARE

## 2024-10-09 DIAGNOSIS — Z99.2: ICD-10-CM

## 2024-10-09 DIAGNOSIS — Z88.2: ICD-10-CM

## 2024-10-09 DIAGNOSIS — I34.0: ICD-10-CM

## 2024-10-09 DIAGNOSIS — F05: ICD-10-CM

## 2024-10-09 DIAGNOSIS — K21.9: ICD-10-CM

## 2024-10-09 DIAGNOSIS — G62.9: ICD-10-CM

## 2024-10-09 DIAGNOSIS — Z86.73: ICD-10-CM

## 2024-10-09 DIAGNOSIS — R35.0: ICD-10-CM

## 2024-10-09 DIAGNOSIS — Z79.51: ICD-10-CM

## 2024-10-09 DIAGNOSIS — E66.9: ICD-10-CM

## 2024-10-09 DIAGNOSIS — N18.6: ICD-10-CM

## 2024-10-09 DIAGNOSIS — I65.21: ICD-10-CM

## 2024-10-09 DIAGNOSIS — R00.0: ICD-10-CM

## 2024-10-09 DIAGNOSIS — R04.0: ICD-10-CM

## 2024-10-09 DIAGNOSIS — Z79.899: ICD-10-CM

## 2024-10-09 DIAGNOSIS — M19.91: ICD-10-CM

## 2024-10-09 DIAGNOSIS — Z88.1: ICD-10-CM

## 2024-10-09 DIAGNOSIS — Z88.0: ICD-10-CM

## 2024-10-09 DIAGNOSIS — N30.90: Primary | ICD-10-CM

## 2024-10-09 DIAGNOSIS — J44.9: ICD-10-CM

## 2024-10-09 DIAGNOSIS — R39.15: ICD-10-CM

## 2024-10-09 DIAGNOSIS — G89.29: ICD-10-CM

## 2024-10-09 DIAGNOSIS — J96.11: ICD-10-CM

## 2024-10-09 DIAGNOSIS — Z82.49: ICD-10-CM

## 2024-10-09 DIAGNOSIS — E78.5: ICD-10-CM

## 2024-10-09 DIAGNOSIS — I13.2: ICD-10-CM

## 2024-10-09 DIAGNOSIS — K29.70: ICD-10-CM

## 2024-10-09 DIAGNOSIS — M89.8X9: ICD-10-CM

## 2024-10-09 DIAGNOSIS — Z79.82: ICD-10-CM

## 2024-10-09 DIAGNOSIS — D63.1: ICD-10-CM

## 2024-10-09 DIAGNOSIS — G93.41: ICD-10-CM

## 2024-10-09 DIAGNOSIS — Z86.14: ICD-10-CM

## 2024-10-09 DIAGNOSIS — I50.32: ICD-10-CM

## 2024-10-09 LAB
ALBUMIN SERPL-MCNC: 3.7 G/DL (ref 3.5–5)
ALP SERPL-CCNC: 120 U/L (ref 38–126)
ALT SERPL-CCNC: 17 U/L (ref 4–34)
ANION GAP SERPL CALC-SCNC: 5 MMOL/L
AST SERPL-CCNC: 22 U/L (ref 14–36)
BASOPHILS # BLD AUTO: 0 K/UL (ref 0–0.2)
BASOPHILS NFR BLD AUTO: 0 %
BUN SERPL-SCNC: 24 MG/DL (ref 7–17)
CALCIUM SPEC-MCNC: 8.8 MG/DL (ref 8.4–10.2)
CHLORIDE SERPL-SCNC: 100 MMOL/L (ref 98–107)
CO2 SERPL-SCNC: 31 MMOL/L (ref 22–30)
EOSINOPHIL # BLD AUTO: 0.1 K/UL (ref 0–0.7)
EOSINOPHIL NFR BLD AUTO: 1 %
ERYTHROCYTE [DISTWIDTH] IN BLOOD BY AUTOMATED COUNT: 2.72 M/UL (ref 3.8–5.4)
ERYTHROCYTE [DISTWIDTH] IN BLOOD: 17.7 % (ref 11.5–15.5)
GLUCOSE SERPL-MCNC: 91 MG/DL (ref 74–99)
HCO3 BLDV-SCNC: 29 MMOL/L (ref 24–28)
HCT VFR BLD AUTO: 25.3 % (ref 34–46)
HGB BLD-MCNC: 8.1 GM/DL (ref 11.4–16)
LYMPHOCYTES # SPEC AUTO: 0.6 K/UL (ref 1–4.8)
LYMPHOCYTES NFR SPEC AUTO: 5 %
MCH RBC QN AUTO: 29.8 PG (ref 25–35)
MCHC RBC AUTO-ENTMCNC: 32 G/DL (ref 31–37)
MCV RBC AUTO: 92.9 FL (ref 80–100)
MONOCYTES # BLD AUTO: 0.7 K/UL (ref 0–1)
MONOCYTES NFR BLD AUTO: 5 %
NEUTROPHILS # BLD AUTO: 11 K/UL (ref 1.3–7.7)
NEUTROPHILS NFR BLD AUTO: 87 %
PCO2 BLDV: 31 MMHG (ref 37–51)
PH BLDV: 7.59 [PH] (ref 7.31–7.41)
PH UR: 7.5 [PH] (ref 5–8)
PLATELET # BLD AUTO: 166 K/UL (ref 150–450)
POTASSIUM SERPL-SCNC: 4 MMOL/L (ref 3.5–5.1)
PROT SERPL-MCNC: 5.8 G/DL (ref 6.3–8.2)
PROT UR QL: (no result)
RBC UR QL: >182 /HPF (ref 0–5)
SODIUM SERPL-SCNC: 136 MMOL/L (ref 137–145)
SP GR UR: 1.02 (ref 1–1.03)
SQUAMOUS UR QL AUTO: 6 /HPF (ref 0–4)
UROBILINOGEN UR QL STRIP: <2 MG/DL (ref ?–2)
WBC # BLD AUTO: 12.7 K/UL (ref 3.8–10.6)
WBC # UR AUTO: >182 /HPF (ref 0–5)

## 2024-10-09 PROCEDURE — 82803 BLOOD GASES ANY COMBINATION: CPT

## 2024-10-09 PROCEDURE — 80048 BASIC METABOLIC PNL TOTAL CA: CPT

## 2024-10-09 PROCEDURE — 83540 ASSAY OF IRON: CPT

## 2024-10-09 PROCEDURE — 85025 COMPLETE CBC W/AUTO DIFF WBC: CPT

## 2024-10-09 PROCEDURE — 81001 URINALYSIS AUTO W/SCOPE: CPT

## 2024-10-09 PROCEDURE — 90935 HEMODIALYSIS ONE EVALUATION: CPT

## 2024-10-09 PROCEDURE — 86140 C-REACTIVE PROTEIN: CPT

## 2024-10-09 PROCEDURE — 80053 COMPREHEN METABOLIC PANEL: CPT

## 2024-10-09 PROCEDURE — 99285 EMERGENCY DEPT VISIT HI MDM: CPT

## 2024-10-09 PROCEDURE — 36415 COLL VENOUS BLD VENIPUNCTURE: CPT

## 2024-10-09 PROCEDURE — 83550 IRON BINDING TEST: CPT

## 2024-10-09 PROCEDURE — 87086 URINE CULTURE/COLONY COUNT: CPT

## 2024-10-09 PROCEDURE — 82728 ASSAY OF FERRITIN: CPT

## 2024-10-09 RX ADMIN — ENOXAPARIN SODIUM SCH MG: 40 INJECTION SUBCUTANEOUS at 23:35

## 2024-10-09 RX ADMIN — ACETAMINOPHEN PRN MG: 325 TABLET, FILM COATED ORAL at 20:52

## 2024-10-09 RX ADMIN — GABAPENTIN SCH MG: 100 CAPSULE ORAL at 18:47

## 2024-10-09 RX ADMIN — METOPROLOL SUCCINATE SCH MG: 25 TABLET, EXTENDED RELEASE ORAL at 20:53

## 2024-10-09 RX ADMIN — DEXTRAN 70 AND HYPROMELLOSE 2910 SCH DROPS: 1; 3 SOLUTION/ DROPS OPHTHALMIC at 20:54

## 2024-10-09 RX ADMIN — DICYCLOMINE HYDROCHLORIDE SCH MG: 10 CAPSULE ORAL at 20:54

## 2024-10-09 NOTE — ED
General Adult HPI





- General


Chief complaint: Recheck/Abnormal Lab/Rx


Stated complaint: lethargy


Time Seen by Provider: 10/09/24 14:23


Source: patient, RN notes reviewed, old records reviewed


Mode of arrival: EMS





- History of Present Illness


Initial comments: 





64-year-old female presenting from [facility] for altered mental status, 

concern for UTI.  Patient was given antibiotics and transferred for further 

evaluation and treatment.  Patient is lethargic but arousable and able to answer

simple questions.  She denies headache.  Denies chest or abdominal pain.  Denies

fever.  Denies nausea.  Patient is on dialysis but continues to make urine and 

was diagnosed with urinary tract infection.





- Related Data


                                Home Medications











 Medication  Instructions  Recorded  Confirmed


 


Albuterol Sulfate [Albuterol 1 puff INHALATION RT-Q4H PRN 08/14/23 10/23/23





Sulfate Hfa]   


 


Atorvastatin Calcium 20 mg PO DAILY 08/14/23 10/23/23


 


Benzonatate [Tessalon Perles] 100 - 200 mg PO TID PRN 08/14/23 10/23/23


 


Budesonide/Formoterol Fumarate 2 puff INHALATION RT-BID 08/14/23 10/23/23





[Symbicort 160-4.5 Mcg Inhaler]   


 


Fluticasone Nasal Spray [Flonase 2 spray EA NOSTRIL DAILY 08/14/23 10/23/23





Nasal Spray]   


 


Isosorbide Mononitrate ER [Imdur] 30 mg PO DAILY 08/14/23 10/23/23


 


Loperamide [Imodium] 4 mg PO QID PRN 08/14/23 10/23/23


 


Metoprolol Succinate (ER) [Toprol 50 mg PO HS 08/14/23 10/23/23





XL]   


 


Montelukast [Singulair] 10 mg PO DAILY 08/14/23 10/23/23


 


Omeprazole [PriLOSEC] 20 mg PO AC-SUPPER 08/14/23 10/23/23


 


Ondansetron Odt [Zofran ODT] 4 mg PO Q8HR PRN 08/14/23 10/23/23


 


SILVER sulfADIAZINE Cream 1 applic TOPICAL BID 08/14/23 10/23/23





[Silvadene 1% Cream]   


 


Ascorbic Acid [Vitamin C] 500 mg PO DAILY 08/15/23 10/23/23


 


Aspirin EC [Ecotrin Low Dose] 81 mg PO DAILY 08/15/23 10/23/23


 


Bismuth Subsalicylate 525 - 1,050 mg PO TID PRN 08/15/23 10/23/23





[Pepto-Bismol Ultra]   


 


Cholecalciferol (Vitamin D3) 125 mcg PO DAILY 08/15/23 10/23/23





[Vitamin D3 (125 MCG = 5,000 IU)]   


 


Cranberry 15,000 Mg Tab 15,000 mg PO DAILY 08/15/23 10/23/23


 


Melatonin/Pyridoxine HCl (B6) 1 tab PO HS PRN 08/15/23 10/23/23





[Melatonin-Vit B6  5-10 mg Tab]   


 


Multivit-Min/FA/Lycopen/Lutein 1 tab PO DAILY 08/15/23 10/23/23





[Centrum Silver Tablet]   


 


Propylene Glycol [Systane Complete] 1 drop BOTH EYES HS 08/15/23 10/23/23


 


Super B Complex 1 tab PO DAILY 08/15/23 10/23/23


 


Vitamin E (Dl,Tocopheryl Acet) 400 unit PO DAILY 08/15/23 10/23/23





[Vitamin E (400 Iu = 180 mg)]   


 


Bumetanide [BUMEX] 4 mg PO BID 10/23/23 10/23/23


 


Midodrine [ProAmatine] 10 mg PO AS DIRECTED PRN 10/23/23 10/23/23


 


Patiromer Calcium Sorbitex 1 packet PO DAILY 10/23/23 10/23/23





[Veltassa]   


 


Sevelamer Carbonate 1,600 mg PO QID 10/23/23 10/23/23


 


amLODIPine [Norvasc] 10 mg PO DAILY 10/23/23 10/23/23


 


hydrALAZINE HCL [Apresoline] 50 mg PO AS DIRECTED 10/23/23 10/23/23








                                  Previous Rx's











 Medication  Instructions  Recorded


 


Dicyclomine [Bentyl] 10 mg PO TID PRN #0 10/25/23


 


Gabapentin [Neurontin] 200 mg PO HS #0 10/25/23











                                    Allergies











Allergy/AdvReac Type Severity Reaction Status Date / Time


 


erythromycin base Allergy  Unknown Verified 10/09/24 14:33


 


furosemide [From Lasix] Allergy  Unknown Verified 10/09/24 14:33


 


green tea Allergy  Unknown Verified 10/09/24 14:33


 


Penicillins Allergy  Unknown Verified 10/09/24 14:33


 


Sulfa (Sulfonamide Allergy  Unknown Verified 10/09/24 14:33





Antibiotics)     


 


sulfacetamide Allergy  Unknown Verified 10/09/24 14:33














Review of Systems


ROS Statement: 


Those systems with pertinent positive or pertinent negative responses have been 

documented in the HPI.





ROS Other: All systems not noted in ROS Statement are negative.





Past Medical History


Past Medical History: Heart Failure, COPD, CVA/TIA, Dialysis, GERD/Reflux, Thyr

oid Disorder


Additional Past Medical History / Comment(s): CKD, endometriosis,


History of Any Multi-Drug Resistant Organisms: Unobtainable


Additional Past Surgical History / Comment(s): Dialysis cath placement


Past Anesthesia/Blood Transfusion Reactions: No Reported Reaction


Past Psychological History: No Psychological Hx Reported


Smoking Status: Unknown if ever smoked


Past Alcohol Use History: Unable to Obtain


Past Drug Use History: Unable to Obtain





General Exam


General appearance: in no apparent distress, lethargic


Head exam: Present: atraumatic, normocephalic


Eye exam: Present: normal appearance, PERRL


Respiratory exam: Present: decreased breath sounds.  Absent: respiratory 

distress


Cardiovascular Exam: Present: regular rate, normal rhythm


GI/Abdominal exam: Present: soft.  Absent: distended, tenderness, guarding


Extremities exam: Present: pedal edema, other ( bilateral lower extremity 

cellulitis)


Neurological exam: Present: alert.  Absent: motor sensory deficit


Skin exam: Present: warm, dry, intact





Course


                                   Vital Signs











  10/09/24





  14:21


 


Temperature 97.6 F


 


Pulse Rate 84


 


Respiratory 20





Rate 


 


Blood Pressure 112/60


 


O2 Sat by Pulse 93 L





Oximetry 














Medical Decision Making





- Medical Decision Making





Was pt. sent in by a medical professional or institution (, PA, NP, urgent 

care, hospital, or nursing home...) When possible be specific


@  -[Transfer from [facility]


Did you speak to anyone other than the patient for history (EMS, parent, family,

 police, friend...)? What history was obtained from this source 


@  -No


Did you review nursing and triage notes (agree or disagree)?  Why? 


@  -I reviewed and agree with nursing and triage notes


Were old charts reviewed (outside hosp., previous admission, EMS record, old 

EKG, old radiological studies, urgent care reports/EKG's, nursing home records)?

 Report findings 


@  -No old charts were reviewed


Differential Weakness:


Hypoglycemia, shock, sepsis, hyponatremia, anemia, infection, MI, ETOH, adverse 

medicine reaction, overdose, stroke, this is not meant to be an all-inclusive 

list.





EKG interpreted by me (3pts min.).


@  -As above


X-rays interpreted by me (1pt min.).


@  -None done


CT interpreted by me (1pt min.).


@  -None done


U/S interpreted by me (1pt. min.).


@  -None done


What testing was considered but not performed or refused? (CT, X-rays, U/S, 

labs)? Why?


@  -None


What meds were considered but not given or refused? Why?


@  -None


Did you discuss the management of the patient with other professionals 

(professionals i.e. , PA, NP, lab, RT, psych nurse, , , 

teacher, , )? Give summary


@  -[Dr. Navarrete


Was smoking cessation discussed for >3mins.?


@  -No


Was critical care preformed (if so, how long)?


@  -No


Were there social determinants of health that impacted care today? How? 

(Homelessness, low income, unemployed, alcoholism, drug addiction, 

transportation, low edu. Level, literacy, decrease access to med. care, residential, 

rehab)?


@  -No


Was there de-escalation of care discussed even if they declined (Discuss DNR or 

withdrawal of care, Hospice)? DNR status


@  -No


What co-morbidities impacted this encounter? (DM, HTN, Smoking, COPD, CAD, 

Cancer, CVA, ARF, Chemo, Hep., AIDS, mental health diagnosis, sleep apnea, 

morbid obesity)?


@  -End-stage renal disease


Was patient admitted / discharged? Hospital course, mention meds given and 

route, prescriptions, significant lab abnormalities, going to OR and other 

pertinent info.


@  -64-year-old female transfer from [facility] end-stage renal disease, 

UTI, lethargy.  Patient received chest x-ray, CT brain, laboratory testing.  

Patient is lethargic but arousable.  Vital signs are stable.  Repeat laboratory 

testing including CBC, CMP and venous blood gas have been ordered these results 

are pending.  The patient will be admitted to Dr. Navarrete who is aware with 

nephrology on consult.  Antibiotics will be continued


Undiagnosed new problem with uncertain prognosis?


@  -No


Drug Therapy requiring intensive monitoring for toxicity (Heparin, Nitro, Insu

aamir, Cardizem)?


@  -No


Were any procedures done?


@  -No


Diagnosis/symptom?


@  -Altered Mental status, UTI, end-stage renal disease


Acute, or Chronic, or Acute on Chronic?


@  -acute


Uncomplicated (without systemic symptoms) or Complicated (systemic symptoms)?


@  -Default


Side effects of treatment?


@  -No


Exacerbation, Progression, or Severe Exacerbation?


@  -No


Poses a threat to life or bodily function? How? (Chest pain, USA, MI, pneumonia,

 PE, COPD, DKA, ARF, appy, cholecystitis, CVA, Diverticulitis, Homicidal, 

Suicidal, threat to staff... and all critical care pts)


@  -yes, delirium, sepsis








Disposition


Clinical Impression: 


 Altered mental status, Renal failure, UTI (urinary tract infection)





Disposition: ADMITTED AS IP TO THIS [facility]


Condition: Stable


Is patient prescribed a controlled substance at d/c from ED?: No


Referrals: 


Isaiha Jacobson MD [Primary Care Provider] - 1-2 days


Time of Disposition: 15:19

## 2024-10-09 NOTE — P.HPIM
History of Present Illness


H&P Date: 10/09/24


Chief Complaint: Lethargic





Pleasant 64-year-old patient, follows with Dr. Alyssa Harrison


 known end-stage kidney disease on hemodialysis  and Friday.  

Chronic medical conditions include mineral bone disease, anemia of chronic 

kidney disease, severe osteoarthritis with chronic pain, uses a walker


Patient went for a scheduled dialysis.  She was found to be somewhat lethargic 

but alert self.  Sent down to [facility].  Patient is found to have 

severe UTI.  Patient is complaining of fever and chills for few days.  Also 

dysuria urinary frequency and urgency.  Did receive a dose of ceftriaxone there.

 Then transferred here because of hemodialysis.





Review of systems:


GEN.: Fever chills


EYES: [None]


HEENT: [None]


NECK: [None]


RESPIRATORY: [None]


CARDIOVASCULAR: [None]


GASTROINTESTINAL: [None]


GENITOURINARY: [Urinary symptoms]


MUSCULOSKELETAL: [Multiple joint pains]


LYMPHATICS: [None]


HEMATOLOGICAL: [None]  


PSYCHIATRY: [None]


NEUROLOGICAL: [Uses a walker]











Past medical history to include:


Chronic kidney disease minimal bone disease, anemia of chronic kidney disease, 

gastritis, carotid artery stenosis, end-stage kidney disease on hemodialysis 

 t and Friday COPD, osteoarthritis





Social history:


Lives alone.  Uses a walker.  Denies alcohol and smoking.





Physical examination:


VITAL SIGNS: 97.6, 84, 20, 112 x 60, 93% on 2 L


GENERAL: BMI 32.9, reclining in the bed lethargic but able to answer questions. 

Right upper chest wall dialysis catheter


EYES: [Pupils equal.  Conjunctiva pale .


HEENT: External appearance of nose and ears normal, oral cavity grossly normal.


NECK: JVD unable to assess; masses not palpable.


HEART: First and second heart sounds are normal;  no edema.  


LUNGS: Respiratory rate normal; decreased breath sounds.  


ABDOMEN: Soft,  nontender, liver spleen not palpable, no masses palpable.  


PSYCH: A bit lethargic but able to answer questions.  To slowly.


MUSCULOSKELETAL:No Clubbing/cyanosis;muscles-grossly intact.  OA


NEUROLOGICAL: Cranial nerves grossly intact; no facial asymmetry, moving all 4 

limbs








INVESTIGATIONS, reviewed in the clinical context:


2024: White count 12.7 hemoglobin 8.1 platelets 166 sodium 136 

potassium 4 BUN 24 creatinine 2.99





Lab work from [facility]:


EKG tracing personally reviewed by me-normal sinus rhythm


Chest x-ray film-mild pulmonary edema reported


CT scan of the brain: Negative


UA: Urine leukoesterase positive


White count 15.5 hemoglobin 9.5 platelets 165 sodium 138 BUN 19.5 creatinine 

2.87








Assessment and plan: 





-Acute metabolic encephalopathy with delirium, from underlying UTI with cystitis


Received 1 dose of ceftriaxone at [facility].  Continue with the same





-Acute UTI with cystitis


IV ceftriaxone.


Culture pending





-Chronic kidney disease minimal bone disease


Sevelamer





-Anemia of chronic kidney disease.


Follow labs





-Gastritis


PPI





-Essential hypertension with end-stage kidney disease


Amlodipine 5 mg twice daily, Toprol XL





-Hyperlipidemia


Lipitor





-Carotid artery stenosis: Moderate approaching severe stenosis right internal 

carotid artery 69%.  50-69% left internal carotid artery. 


Follow-up with Dr. Funez vascular


Aspirin.  Lipitor.





.


-End-stage renal disease( and Friday,


Right subclavian-dialysis access





-Peripheral neuropathy


Neurontin





-Primary osteoarthritis, multiple joints


Pain medications as needed


.





-COPD,


Symbicort.  Albuterol when necessary.  Singulair





-Chronic hypoxic respiratory failure underlying COPD


Has home oxygen





-Full code








Consult nephrology.  Discussed with patient.  Given the complexity and severity 

of patient's condition expect the patient to be in the hospital at least for 2 

overnights





Past Medical History


Past Medical History: Heart Failure, COPD, CVA/TIA, Dialysis, GERD/Reflux, 

Hyperlipidemia, Hypertension, Osteoarthritis (OA), Skin Disorder, Syncope, 

Thyroid Disorder


Additional Past Medical History / Comment(s): CKD, endometriosis, gastritis, 

carotid artery stenosis, Hemodialysis for 7.5 years, shingles-recurrent


History of Any Multi-Drug Resistant Organisms: MRSA


Date of last positivie culture/infection: unk


MDRO Source:: left arm


Past Surgical History: Uterine Ablation


Additional Past Surgical History / Comment(s): Dialysis cath placement right 

subclavian, old fistula left arm- not in use,


Past Anesthesia/Blood Transfusion Reactions: No Reported Reaction


Past Psychological History: No Psychological Hx Reported


Smoking Status: Unknown if ever smoked


Past Alcohol Use History: None Reported


Past Drug Use History: None Reported





- Past Family History


  ** Father


Family Medical History: Diabetes Mellitus





  ** Mother


Family Medical History: Cancer, Congestive Heart Failure (CHF)


Additional Family Medical History / Comment(s): thyroid CA, 





Medications and Allergies


                                Home Medications











 Medication  Instructions  Recorded  Confirmed  Type


 


Albuterol Sulfate [Albuterol 1 puff INHALATION RT-Q4H PRN 08/14/23 10/09/24 

History





Sulfate Hfa]    


 


Atorvastatin Calcium 20 mg PO DAILY 08/14/23 10/09/24 History


 


Fluticasone Nasal Spray [Flonase 2 spray EA NOSTRIL DAILY 08/14/23 10/09/24 

History





Nasal Spray]    


 


Isosorbide Mononitrate ER [Imdur] 30 mg PO DAILY 08/14/23 10/09/24 History


 


Loperamide [Imodium] 4 mg PO QID PRN 08/14/23 10/09/24 History


 


Metoprolol Succinate (ER) [Toprol 25 mg PO BID 08/14/23 10/09/24 History





XL]    


 


Montelukast [Singulair] 10 mg PO DAILY 08/14/23 10/09/24 History


 


Omeprazole [PriLOSEC] 20 mg PO AC-SUPPER 08/14/23 10/09/24 History


 


Ascorbic Acid [Vitamin C] 500 mg PO DAILY 08/15/23 10/09/24 History


 


Aspirin EC [Ecotrin Low Dose] 81 mg PO DAILY 08/15/23 10/09/24 History


 


Bismuth Subsalicylate 525 - 1,050 mg PO TID PRN 08/15/23 10/09/24 History





[Pepto-Bismol Ultra]    


 


Cholecalciferol (Vitamin D3) 125 mcg PO DAILY 08/15/23 10/09/24 History





[Vitamin D3 (125 MCG = 5,000 IU)]    


 


Cranberry 15,000 Mg Tab 15,000 mg PO DAILY 08/15/23 10/09/24 History


 


Multivit-Min/FA/Lycopen/Lutein 1 tab PO DAILY 08/15/23 10/09/24 History





[Centrum Silver Tablet]    


 


Propylene Glycol [Systane Complete] 1 drop BOTH EYES HS 08/15/23 10/09/24 

History


 


Super B Complex 1 tab PO DAILY 08/15/23 10/09/24 History


 


Vitamin E (Dl,Tocopheryl Acet) 400 unit PO DAILY 08/15/23 10/09/24 History





[Vitamin E (400 Iu = 180 mg)]    


 


Bumetanide [BUMEX] 2 mg PO Q4H PRN 10/23/23 10/09/24 History


 


Midodrine [ProAmatine] 1 dose PO AS DIRECTED 10/23/23 10/23/23 History


 


Sevelamer Carbonate 1 dose PO AS DIRECTED 10/23/23 10/23/23 History


 


amLODIPine [Norvasc] 5 mg PO BID 10/23/23 10/09/24 History


 


Cephalexin [Keflex] 500 mg PO Q12HR 10/09/24 10/09/24 History


 


Cinacalcet [Sensipar] 30 mg PO AS DIRECTED 10/09/24 10/09/24 History


 


DULoxetine HCL [Cymbalta] 30 mg PO DAILY 10/09/24 10/09/24 History


 


Dicyclomine [Bentyl] 10 mg PO TID 10/09/24 10/09/24 History


 


Gabapentin [Neurontin] 100 mg PO QID 10/09/24 10/09/24 History


 


Ketoconazole 2% Shampoo [Nizoral] 1 applic TOPICAL WEEKLY 10/09/24 10/09/24 

History


 


Melatonin [Melatonin ER] 5 - 10 mg PO HS PRN 10/09/24 10/09/24 History


 


Metoprolol Succinate (ER) [Toprol 50 mg PO DAILY 10/09/24 10/09/24 History





XL]    








                                    Allergies











Allergy/AdvReac Type Severity Reaction Status Date / Time


 


erythromycin base Allergy  Rash/Hives Verified 10/09/24 16:53


 


furosemide [From Lasix] Allergy  Unknown Verified 10/09/24 16:53


 


green tea Allergy  Unknown Verified 10/09/24 16:53


 


Penicillins Allergy  Rash/Hives Verified 10/09/24 16:53


 


Sulfa (Sulfonamide Allergy  Anaphylaxis Verified 10/09/24 16:53





Antibiotics)     


 


sulfacetamide Allergy  Unknown Verified 10/09/24 16:53














Physical Exam


Vitals: 


                                   Vital Signs











  Temp Pulse Pulse Resp BP BP Pulse Ox


 


 10/09/24 18:30  99.3 F   91  18   116/69  97


 


 10/09/24 16:09   88   18  110/68   95


 


 10/09/24 14:21  97.6 F  84   20  112/60   93 L








                                Intake and Output











 10/09/24 10/09/24 10/09/24





 06:59 14:59 22:59


 


Output Total   25


 


Balance   -25


 


Output:   


 


  Stool   25


 


Other:   


 


  Weight  77.111 kg 79 kg














Results


CBC & Chem 7: 


                                 10/09/24 15:23





                                 10/09/24 15:23


Labs: 


                  Abnormal Lab Results - Last 24 Hours (Table)











  10/09/24 10/09/24 10/09/24 Range/Units





  15:23 15:23 15:23 


 


WBC  12.7 H    (3.8-10.6)  k/uL


 


RBC  2.72 L    (3.80-5.40)  m/uL


 


Hgb  8.1 L    (11.4-16.0)  gm/dL


 


Hct  25.3 L    (34.0-46.0)  %


 


RDW  17.7 H    (11.5-15.5)  %


 


Neutrophils #  11.0 H    (1.3-7.7)  k/uL


 


Lymphocytes #  0.6 L    (1.0-4.8)  k/uL


 


VBG pH    7.59 H  (7.31-7.41)  


 


VBG pCO2    31 L  (37-51)  mmHg


 


VBG HCO3    29 H  (24-28)  mmol/L


 


Sodium   136 L   (137-145)  mmol/L


 


Carbon Dioxide   31 H   (22-30)  mmol/L


 


BUN   24 H   (7-17)  mg/dL


 


Creatinine   2.99 H   (0.52-1.04)  mg/dL


 


Total Protein   5.8 L   (6.3-8.2)  g/dL


 


Urine Appearance     (Clear)  


 


Urine Protein     (Negative)  


 


Urine Blood     (Negative)  


 


Ur Leukocyte Esterase     (Negative)  


 


Urine RBC     (0-5)  /hpf


 


Urine WBC     (0-5)  /hpf


 


Ur Squamous Epith Cells     (0-4)  /hpf














  10/09/24 Range/Units





  19:05 


 


WBC   (3.8-10.6)  k/uL


 


RBC   (3.80-5.40)  m/uL


 


Hgb   (11.4-16.0)  gm/dL


 


Hct   (34.0-46.0)  %


 


RDW   (11.5-15.5)  %


 


Neutrophils #   (1.3-7.7)  k/uL


 


Lymphocytes #   (1.0-4.8)  k/uL


 


VBG pH   (7.31-7.41)  


 


VBG pCO2   (37-51)  mmHg


 


VBG HCO3   (24-28)  mmol/L


 


Sodium   (137-145)  mmol/L


 


Carbon Dioxide   (22-30)  mmol/L


 


BUN   (7-17)  mg/dL


 


Creatinine   (0.52-1.04)  mg/dL


 


Total Protein   (6.3-8.2)  g/dL


 


Urine Appearance  Turbid H  (Clear)  


 


Urine Protein  2+ H  (Negative)  


 


Urine Blood  Moderate H  (Negative)  


 


Ur Leukocyte Esterase  Large H  (Negative)  


 


Urine RBC  >182 H  (0-5)  /hpf


 


Urine WBC  >182 H  (0-5)  /hpf


 


Ur Squamous Epith Cells  6 H  (0-4)  /hpf














Thrombosis Risk Factor Assmnt





- Choose All That Apply


Each Factor Represents 1 point: Abnormal pulmonary function (COPD), Obesity (BMI

>25), Swollen legs (current)


Each Risk Factor Represents 2 Points: Age 61-74 years


Thrombosis Risk Factor Assessment Total Risk Factor Score: 5


Thrombosis Risk Factor Assessment Level: High Risk

## 2024-10-10 LAB
IRON SERPL-MCNC: 83 UG/DL (ref 50–170)
TIBC SERPL-MCNC: 256 UG/DL (ref 228–460)

## 2024-10-10 PROCEDURE — 5A1D70Z PERFORMANCE OF URINARY FILTRATION, INTERMITTENT, LESS THAN 6 HOURS PER DAY: ICD-10-PCS

## 2024-10-10 RX ADMIN — METOCLOPRAMIDE SCH MG: 10 TABLET ORAL at 15:40

## 2024-10-10 RX ADMIN — CHOLECALCIFEROL TAB 125 MCG (5000 UNIT) SCH MCG: 125 TAB at 08:44

## 2024-10-10 RX ADMIN — FLUTICASONE PROPIONATE SCH SPRAY: 50 SPRAY, METERED NASAL at 08:45

## 2024-10-10 RX ADMIN — THERA TABS SCH EACH: TAB at 08:44

## 2024-10-10 RX ADMIN — MONTELUKAST SODIUM SCH MG: 10 TABLET, FILM COATED ORAL at 08:44

## 2024-10-10 RX ADMIN — CINACALCET HYDROCHLORIDE SCH MG: 30 TABLET, COATED ORAL at 08:44

## 2024-10-10 RX ADMIN — OXYCODONE HYDROCHLORIDE AND ACETAMINOPHEN SCH MG: 500 TABLET ORAL at 08:44

## 2024-10-10 RX ADMIN — MIDODRINE HYDROCHLORIDE SCH MG: 5 TABLET ORAL at 17:29

## 2024-10-10 RX ADMIN — Medication SCH UNIT: at 08:44

## 2024-10-10 RX ADMIN — ATORVASTATIN CALCIUM SCH MG: 20 TABLET, FILM COATED ORAL at 08:44

## 2024-10-10 RX ADMIN — ISOSORBIDE MONONITRATE SCH MG: 30 TABLET, EXTENDED RELEASE ORAL at 08:44

## 2024-10-10 RX ADMIN — PANTOPRAZOLE SODIUM SCH MG: 40 TABLET, DELAYED RELEASE ORAL at 17:26

## 2024-10-10 RX ADMIN — ASPIRIN 81 MG CHEWABLE TABLET SCH MG: 81 TABLET CHEWABLE at 08:44

## 2024-10-10 RX ADMIN — SEVELAMER CARBONATE SCH MG: 800 TABLET, FILM COATED ORAL at 17:25

## 2024-10-10 NOTE — P.NPCON
History of Present Illness





- Reason for Consult


end stage renal disease





- History of Present Illness





Reason for consultation: End-stage renal disease





History of present illness:


Patient is a 64-year-old female seen in renal consultation for end-stage renal 

disease.  She is maintained on hemodialysis on  schedule.

 Patient came to the hospital yesterday from the dialysis unit due to 

somnolence.  Patient was noted to be more lethargic and was sent to the 

hospital.  Patient is currently awake and alert.  She does admit to a cough with

yellow phlegm.  She has a permacath for her dialysis access.  Denies any fever 

or chills.  Patient states she does make little urine.  Denies history of 

diabetes.  She is also noted to have a UTI and is currently receiving IV 

antibiotics.  Denies vomiting or diarrhea.  Echocardiogram from 2023 

showed moderate mitral regurgitation.





Vital signs are stable.


General: No acute distress.


HEENT: Head exam is unremarkable.  On nasal cannula.


LUNGS: No audible rhonchi or wheezes.


HEART: Rate and Rhythm are regular.


ABDOMEN: Nontender.


EXTREMITITES: 1+ edema.  Lower extremities tender to touch.





Past Medical History


Past Medical History: Heart Failure, COPD, CVA/TIA, Dialysis, GERD/Reflux, 

Hyperlipidemia, Hypertension, Osteoarthritis (OA), Skin Disorder, Syncope, 

Thyroid Disorder


Additional Past Medical History / Comment(s): CKD, endometriosis, gastritis, 

carotid artery stenosis, Hemodialysis for 7.5 years, shingles-recurrent


History of Any Multi-Drug Resistant Organisms: MRSA


Date of last positivie culture/infection: unk


MDRO Source:: left arm


Past Surgical History: Uterine Ablation


Additional Past Surgical History / Comment(s): Dialysis cath placement right 

subclavian, old fistula left arm- not in use,


Past Anesthesia/Blood Transfusion Reactions: No Reported Reaction


Past Psychological History: No Psychological Hx Reported


Smoking Status: Unknown if ever smoked


Past Alcohol Use History: None Reported


Past Drug Use History: None Reported





- Past Family History


  ** Father


Family Medical History: Diabetes Mellitus





  ** Mother


Family Medical History: Cancer, Congestive Heart Failure (CHF)


Additional Family Medical History / Comment(s): thyroid CA, 





Medications and Allergies


                                Home Medications











 Medication  Instructions  Recorded  Confirmed  Type


 


Albuterol Sulfate [Albuterol 1 puff INHALATION RT-Q4H PRN 08/14/23 10/09/24 

History





Sulfate Hfa]    


 


Atorvastatin Calcium 20 mg PO DAILY 08/14/23 10/09/24 History


 


Fluticasone Nasal Spray [Flonase 2 spray EA NOSTRIL DAILY 08/14/23 10/09/24 

History





Nasal Spray]    


 


Isosorbide Mononitrate ER [Imdur] 30 mg PO DAILY 08/14/23 10/09/24 History


 


Loperamide [Imodium] 4 mg PO QID PRN 08/14/23 10/09/24 History


 


Metoprolol Succinate (ER) [Toprol 25 mg PO BID 08/14/23 10/09/24 History





XL]    


 


Montelukast [Singulair] 10 mg PO DAILY 08/14/23 10/09/24 History


 


Omeprazole [PriLOSEC] 20 mg PO AC-SUPPER 08/14/23 10/09/24 History


 


Ascorbic Acid [Vitamin C] 500 mg PO DAILY 08/15/23 10/09/24 History


 


Aspirin EC [Ecotrin Low Dose] 81 mg PO DAILY 08/15/23 10/09/24 History


 


Bismuth Subsalicylate 525 - 1,050 mg PO TID PRN 08/15/23 10/09/24 History





[Pepto-Bismol Ultra]    


 


Cholecalciferol (Vitamin D3) 125 mcg PO DAILY 08/15/23 10/09/24 History





[Vitamin D3 (125 MCG = 5,000 IU)]    


 


Cranberry 15,000 Mg Tab 15,000 mg PO DAILY 08/15/23 10/09/24 History


 


Multivit-Min/FA/Lycopen/Lutein 1 tab PO DAILY 08/15/23 10/09/24 History





[Centrum Silver Tablet]    


 


Propylene Glycol [Systane Complete] 1 drop BOTH EYES HS 08/15/23 10/09/24 

History


 


Super B Complex 1 tab PO DAILY 08/15/23 10/09/24 History


 


Vitamin E (Dl,Tocopheryl Acet) 400 unit PO DAILY 08/15/23 10/09/24 History





[Vitamin E (400 Iu = 180 mg)]    


 


Bumetanide [BUMEX] 2 mg PO Q4H PRN 10/23/23 10/09/24 History


 


Midodrine [ProAmatine] 5 mg PO TID 10/23/23 10/10/24 History


 


Sevelamer Carbonate 1,600 mg PO TID-W/MEALS 10/23/23 10/10/24 History


 


amLODIPine [Norvasc] 5 mg PO BID 10/23/23 10/09/24 History


 


Cephalexin [Keflex] 500 mg PO Q12HR 10/09/24 10/09/24 History


 


Cinacalcet [Sensipar] 30 mg PO AS DIRECTED 10/09/24 10/09/24 History


 


DULoxetine HCL [Cymbalta] 30 mg PO DAILY 10/09/24 10/09/24 History


 


Dicyclomine [Bentyl] 10 mg PO TID 10/09/24 10/09/24 History


 


Gabapentin [Neurontin] 100 mg PO QID 10/09/24 10/09/24 History


 


Ketoconazole 2% Shampoo [Nizoral] 1 applic TOPICAL WEEKLY 10/09/24 10/09/24 

History


 


Melatonin [Melatonin ER] 5 - 10 mg PO HS PRN 10/09/24 10/09/24 History


 


Metoprolol Succinate (ER) [Toprol 50 mg PO DAILY 10/09/24 10/09/24 History





XL]    


 


Sevelamer [Renvela] 1,600 mg PO DAILY PRN 10/10/24 10/10/24 History








                                    Allergies











Allergy/AdvReac Type Severity Reaction Status Date / Time


 


erythromycin base Allergy  Rash/Hives Verified 10/09/24 16:53


 


furosemide [From Lasix] Allergy  Unknown Verified 10/09/24 16:53


 


green tea Allergy  Unknown Verified 10/09/24 16:53


 


Penicillins Allergy  Rash/Hives Verified 10/09/24 16:53


 


Sulfa (Sulfonamide Allergy  Anaphylaxis Verified 10/09/24 16:53





Antibiotics)     


 


sulfacetamide Allergy  Unknown Verified 10/09/24 16:53














Physical Exam


Vitals: 


                                   Vital Signs











  Temp Pulse Pulse Pulse Resp BP BP


 


 10/10/24 07:16  97.7 F   64   16   111/68


 


 10/09/24 20:00  98.9 F    84  16   102/65


 


 10/09/24 18:30  99.3 F    91  18   116/69


 


 10/09/24 16:09   88    18  110/68 


 


 10/09/24 14:21  97.6 F  84    20  112/60 














  Pulse Ox


 


 10/10/24 07:16  95


 


 10/09/24 20:00  97


 


 10/09/24 18:30  97


 


 10/09/24 16:09  95


 


 10/09/24 14:21  93 L








                                Intake and Output











 10/09/24 10/10/24 10/10/24





 22:59 06:59 14:59


 


Intake Total   240


 


Output Total 25 0 


 


Balance -25 0 240


 


Intake:   


 


  Oral   240


 


Output:   


 


  Urine  0 


 


  Stool 25  


 


Other:   


 


  Weight 79 kg  














Results





- Lab Results


                             Most recent lab results











Calcium  8.8 mg/dL (8.4-10.2)   10/09/24  15:23    














                                 10/09/24 15:23





                                 10/09/24 15:23





Assessment and Plan


Plan: 





Assessment:


1.  End-stage renal disease maintained on hemodialysis on  schedule via permacath.


2.  UTI on antibiotics.


3.  Chronic diastolic CHF and moderate mitral regurgitation.


4.  Chronic kidney disease mineral bone disease maintained on Renvela and 

Sensipar.


5.  Volume overload.


6.  Anemia of chronic kidney disease.  Rule out iron deficiency.





Plan:


Extra treatment of hemodialysis today mostly for ultrafiltration.  Another 

treatment tomorrow per her outpatient schedule.


Maintain Bumex.


Check iron studies.


Hold amlodipine for systolic blood pressure less than 120.





Thank you for the consultation.  I will continue to follow the patient with you 

during her hospital stay.

## 2024-10-10 NOTE — P.PN
Progress Note - Text


Progress Note Date: 10/10/24





Chief Complaint: Lethargic





Pleasant 64-year-old patient, follows with Dr. Alyssa Harrison


 known end-stage kidney disease on hemodialysis Monday Wednesday and Friday.  

Chronic medical conditions include mineral bone disease, anemia of chronic 

kidney disease, severe osteoarthritis with chronic pain, uses a walker


Patient went for a scheduled dialysis.  She was found to be somewhat lethargic 

but alert self.  Sent down to [facility].  Patient is found to have sever

e UTI.  Patient is complaining of fever and chills for few days.  Also dysuria 

urinary frequency and urgency.  Did receive a dose of ceftriaxone there.  Then 

transferred here because of hemodialysis.





October 10, 2024: Seen this morning.  Edema.  Laying in bed.  Looking better.  

Has some epistaxis.  Local packing.  Told patient not to scratch.  No fever.  

Feeling tired.  Had 100% breakfast.  Patient get extra ultrafiltration treatment

today for fluid overload.  And scheduled for her regular dialysis tomorrow.





Active Medications





Acetaminophen (Acetaminophen Tab 325 Mg Tab)  650 mg PO Q6HR PRN


   PRN Reason: Mild Pain or Fever > 100.5


   Last Admin: 10/10/24 12:43 Dose:  650 mg


   


Albuterol Sulfate (Albuterol Nebulized 2.5 Mg/3 Ml)  2.5 mg INHALATION RT-Q4H 

PRN


   PRN Reason: Shortness Of Breath


Amlodipine Besylate (Amlodipine 5 Mg Tab)  5 mg PO BID [facility]


   Last Admin: 10/10/24 08:44 Dose:  5 mg


   


Artificial Tears (Artificial Tears-Hypromellose Drops 15 Ml Btl)  1 drops BOTH 

EYES HS [facility]


   Last Admin: 10/09/24 20:54 Dose:  1 drops


   


Ascorbic Acid (Ascorbic Acid 500 Mg Tab)  500 mg PO DAILY [facility]


   Last Admin: 10/10/24 08:44 Dose:  500 mg


   


Aspirin (Aspirin 81 Mg)  81 mg PO DAILY [facility]


   Last Admin: 10/10/24 08:44 Dose:  81 mg


   


Atorvastatin Calcium (Atorvastatin 20 Mg Tab)  20 mg PO DAILY [facility]


   Last Admin: 10/10/24 08:44 Dose:  20 mg


   


Cholecalciferol (Cholecalciferol 125 Mcg (5000 Iu) Tablet)  125 mcg PO DAILY [facility]


   Last Admin: 10/10/24 08:44 Dose:  125 mcg


   


Cinacalcet (Cinacalcet 30 Mg Tab)  30 mg PO MoTh [facility]


   Last Admin: 10/10/24 08:44 Dose:  30 mg


   


Dicyclomine HCl (Dicyclomine 10 Mg Cap)  10 mg PO TID [facility]


   Last Admin: 10/10/24 15:41 Dose:  10 mg


   


Duloxetine HCl (Duloxetine Hcl 30 Mg Capsule.Dr)  30 mg PO DAILY [facility]


   Last Admin: 10/10/24 08:44 Dose:  30 mg


   


Enoxaparin Sodium (Enoxaparin 30 Mg/0.3 Ml Syringe)  30 mg SQ DAILY [facility]


Fluticasone Propionate (Fluticasone Nasal 50mcg/Spray 16gm Btl)  2 spray EA 

NOSTRIL DAILY [facility]


   Last Admin: 10/10/24 08:45 Dose:  2 spray


   


Gabapentin (Gabapentin 100 Mg Cap)  100 mg PO QID [facility]


   Last Admin: 10/10/24 12:45 Dose:  100 mg


   


Ceftriaxone Sodium 1 gm/ (Sodium Chloride)  50 mls @ 100 mls/hr IVPB Q24HR [facility]; 

Protocol


   Last Admin: 10/10/24 08:44 Dose:  100 mls/hr


   


Isosorbide Mononitrate (Isosorbide Mononitrate Er 30 Mg Tab.Er.24h)  30 mg PO 

DAILY [facility]


   Last Admin: 10/10/24 08:44 Dose:  30 mg


   


Loperamide HCl (Loperamide 2 Mg Cap)  4 mg PO QID PRN


   PRN Reason: Diarrhea


Melatonin (Melatonin 5 Mg Tablet)  5 mg PO HS PRN


   PRN Reason: Insomnia


Metoclopramide HCl (Metoclopramide 10 Mg Tab)  10 mg PO AC-TID [facility]


   Last Admin: 10/10/24 17:13 Dose:  Not Given


   


Metoprolol Succinate (Metoprolol Succinate (Er) 25 Mg Tab.Er.24h)  25 mg PO BID 

[facility]


   Last Admin: 10/10/24 08:44 Dose:  25 mg


   


Midodrine (Midodrine 5 Mg Tab)  5 mg PO AC-TID [facility]


Montelukast Sodium (Montelukast 10 Mg Tab)  10 mg PO DAILY [facility]


   Last Admin: 10/10/24 08:44 Dose:  10 mg


   


Multivitamins (Multivitamins, Thera 1 Each Tab)  1 each PO DAILY [facility]


   Last Admin: 10/10/24 08:44 Dose:  1 each


   


Naloxone HCl (Naloxone 0.4 Mg/Ml 1 Ml Vial)  0.2 mg IV Q2M PRN


   PRN Reason: Opioid Reversal


Pantoprazole Sodium (Pantoprazole 40 Mg Tablet)  40 mg PO AC-SUPPER [facility]


Sevelamer Carbonate (Sevelamer 800 Mg Tab)  1,600 mg PO TID-W/MEALS [facility]


Vitamin E (Vitamin E (Dl,Tocopheryl Acet) 400 Unit (180 Mg) Cap)  400 unit PO 

DAILY [facility]


   Last Admin: 10/10/24 08:44 Dose:  400 unit


   

















Past medical history to include:


Chronic kidney disease minimal bone disease, anemia of chronic kidney disease, 

gastritis, carotid artery stenosis, end-stage kidney disease on hemodialysis M onday Wednesday t and Friday COPD, osteoarthritis





Social history:


Lives alone.  Uses a walker.  Denies alcohol and smoking.





Physical examination:


VITAL SIGNS: 98.4, 72, 20, 124 x 74, 100% on 2 L


GENERAL: BMI 32.9, reclining in the bed, more awake today.  Right upper chest 

wall dialysis catheter


EYES: [Pupils equal.  Conjunctiva pale .


HEENT: External appearance of nose and ears normal, oral cavity grossly normal. 

 Some bleeding at right naris


NECK: JVD unable to assess; masses not palpable.


HEART: First and second heart sounds are normal; edema present.


LUNGS: Respiratory rate normal; decreased breath sounds.  


ABDOMEN: Soft,  nontender, liver spleen not palpable, no masses palpable.  


PSYCH: More awake, answering questions appropriately


MUSCULOSKELETAL:No Clubbing/cyanosis;muscles-grossly intact.  OA


NEUROLOGICAL: Cranial nerves grossly intact; no facial asymmetry, moving all 4 

limbs








INVESTIGATIONS, reviewed in the clinical context:


October 9, 2024: White count 12.7 hemoglobin 8.1 platelets 166 sodium 136 

potassium 4 BUN 24 creatinine 2.99





Lab work from [facility]:


EKG tracing personally reviewed by me-normal sinus rhythm


Chest x-ray film-mild pulmonary edema reported


CT scan of the brain: Negative


UA: Urine leukoesterase positive


White count 15.5 hemoglobin 9.5 platelets 165 sodium 138 BUN 19.5 creatinine 

2.87








Assessment and plan: 





-Acute metabolic encephalopathy with delirium, from underlying UTI with 

cystitis: Improving


Received 1 dose of ceftriaxone at [facility].  Continue with the same





-Acute UTI with cystitis


IV ceftriaxone.


Culture pending





-Fluid overload


Will get extra dose of hemodialysis/ultrafiltration today.  Has scheduled 

dialysis tomorrow.





-Chronic kidney disease minimal bone disease


Sevelamer





-Anemia of chronic kidney disease.


Follow labs





-Gastritis


PPI





-Essential hypertension with end-stage kidney disease


Amlodipine 5 mg twice daily, Toprol XL





-Hyperlipidemia


Lipitor





-Carotid artery stenosis: Moderate approaching severe stenosis right internal 

carotid artery 69%.  50-69% left internal carotid artery. 


Follow-up with Dr. Funez vascular


Aspirin.  Lipitor.





.


-End-stage renal disease(Monday Wednesday and Friday,


Right subclavian-dialysis access





-Peripheral neuropathy


Neurontin





-Primary osteoarthritis, multiple joints


Pain medications as needed


.





-COPD,


Symbicort.  Albuterol when necessary.  Singulair





-Chronic hypoxic respiratory failure underlying COPD


Has home oxygen





-Full code








Extra ultrafiltration today.  Has scheduled dialysis tomorrow.  IV ceftriaxone 

to continue.  Discussed with patient.





Past Medical History


Past Medical History: Heart Failure, COPD, CVA/TIA, Dialysis, GERD/Reflux, 

Hyperlipidemia, Hypertension, Osteoarthritis (OA), Skin Disorder, Syncope, 

Thyroid Disorder


Additional Past Medical History / Comment(s): CKD, endometriosis, gastritis, 

carotid artery stenosis, Hemodialysis for 7.5 years, shingles-recurrent


History of Any Multi-Drug Resistant Organisms: MRSA


Date of last positivie culture/infection: unk


MDRO Source:: left arm


Past Surgical History: Uterine Ablation


Additional Past Surgical History / Comment(s): Dialysis cath placement right 

subclavian, old fistula left arm- not in use,


Past Anesthesia/Blood Transfusion Reactions: No Reported Reaction


Past Psychological History: No Psychological Hx Reported


Smoking Status: Unknown if ever smoked


Past Alcohol Use History: None Reported


Past Drug Use History: None Reported

## 2024-10-11 RX ADMIN — ENOXAPARIN SODIUM SCH MG: 30 INJECTION SUBCUTANEOUS at 09:14

## 2024-10-11 RX ADMIN — DARBEPOETIN ALFA SCH MCG: 40 INJECTION, SOLUTION INTRAVENOUS; SUBCUTANEOUS at 13:26

## 2024-10-11 NOTE — P.PN
Subjective





Patient is seen in follow-up for end-stage renal disease.  She is maintained on 

hemodialysis on Monday Wednesday Friday schedule.  Received extra treatment of 

hemodialysis today.  Currently tolerating the treatment well.





Vital signs are stable.


General: No acute distress.


HEENT: Head exam is unremarkable.  On nasal cannula.


LUNGS: No audible rhonchi or wheezes.


HEART: Rate and Rhythm are regular. 


ABDOMEN: Nontender.


EXTREMITITES: Lower extremities tender to touch.  1+ edema.





Objective





- Vital Signs


Vital signs: 


                                   Vital Signs











Temp  97.7 F   10/11/24 10:41


 


Pulse  58 L  10/11/24 10:41


 


Resp  16   10/11/24 10:41


 


BP  102/57   10/11/24 10:41


 


Pulse Ox  100   10/11/24 08:00


 


FiO2      








                                 Intake & Output











 10/10/24 10/11/24 10/11/24





 18:59 06:59 18:59


 


Intake Total 640  400


 


Output Total 4402  5400


 


Balance -3762  -5000


 


Intake:   


 


  Oral 240  


 


  Hemodialysis 400  400


 


Output:   


 


  Emesis 2  


 


  Hemodialysis 2400  2900


 


  Hemodialysis Net Amount 2000  2500


 


Other:   


 


  # Voids 1 3 














- Labs


CBC & Chem 7: 


                                 10/09/24 15:23





                                 10/09/24 15:23


Labs: 


                  Abnormal Lab Results - Last 24 Hours (Table)











  10/09/24 Range/Units





  15:23 


 


Transferrin  183.0 L  (204.0-354.0)  mg/dL


 


Ferritin  2224.0 H  (10.0-291.0)  ng/mL








                      Microbiology - Last 24 Hours (Table)











 10/09/24 19:05 Urine Culture - Final





 Urine,Voided 














Assessment and Plan


Plan: 





Assessment:


1.  End-stage renal disease maintained on hemodialysis on Monday Wednesday Friday schedule via [facility].


2.  UTI on antibiotics.


3.  Chronic diastolic CHF and moderate mitral regurgitation.


4.  Chronic kidney disease mineral bone disease maintained on Renvela and 

Sensipar.


5.  Volume overload.  Better with ultrafiltration.


6.  Anemia of chronic kidney disease.  Iron replete.





Plan:


Currently seen while undergoing hemodialysis.  Next treatment Monday.


Maintain Bumex.


Add Aranesp.


Hold amlodipine for systolic blood pressure less than 120.

## 2024-10-11 NOTE — P.PN
Subjective


Progress Note Date: 10/11/24





64-year-old patient, follows with Dr. Alyssa Harrison


 known end-stage kidney disease on hemodialysis Monday Wednesday and Friday.  

Chronic medical conditions include mineral bone disease, anemia of chronic ki

dney disease, severe osteoarthritis with chronic pain, uses a walker


Patient went for a scheduled dialysis.  She was found to be somewhat lethargic 

but alert self.  Sent down to [facility].  Patient is found to have 

severe UTI.  Patient is complaining of fever and chills for few days.  Also d

ysuria urinary frequency and urgency.  Did receive a dose of ceftriaxone there. 

Then transferred here because of hemodialysis.





--Patient remains somewhat slow to respond; unclear if patient is back to 

baseline


-- Remains on IV Rocephin; given elevated white blood count, positive UA and 

mental status change, plan to continue with IV Rocephin and make further 

recommendations once urine culture results are available





Objective





- Vital Signs


Vital signs: 


                                   Vital Signs











Temp  97.7 F   10/11/24 10:41


 


Pulse  58 L  10/11/24 10:41


 


Resp  16   10/11/24 10:41


 


BP  102/57   10/11/24 10:41


 


Pulse Ox  100   10/11/24 08:00


 


FiO2      








                                 Intake & Output











 10/10/24 10/11/24 10/11/24





 18:59 06:59 18:59


 


Intake Total 640  400


 


Output Total 4402  5400


 


Balance -3762  -5000


 


Intake:   


 


  Oral 240  


 


  Hemodialysis 400  400


 


Output:   


 


  Emesis 2  


 


  Hemodialysis 2400  2900


 


  Hemodialysis Net Amount 2000  2500


 


Other:   


 


  # Voids 1 3 














- Exam





GENERAL: BMI 32.9, reclining in the bed, more awake today.  Right upper chest 

wall dialysis catheter


EYES: [Pupils equal.  Conjunctiva pale .


HEENT: External appearance of nose and ears normal, oral cavity grossly normal. 

 Some bleeding at right naris


NECK: JVD unable to assess; masses not palpable.


HEART: First and second heart sounds are normal; edema present.


LUNGS: Respiratory rate normal; decreased breath sounds.  


ABDOMEN: Soft,  nontender, liver spleen not palpable, no masses palpable.  


PSYCH: More awake, answering questions appropriately


MUSCULOSKELETAL:No Clubbing/cyanosis;muscles-grossly intact.  OA


NEUROLOGICAL: Cranial nerves grossly intact; no facial asymmetry, moving all 4 

limbs





- Labs


CBC & Chem 7: 


                                 10/09/24 15:23





                                 10/09/24 15:23


Labs: 


                  Abnormal Lab Results - Last 24 Hours (Table)











  10/09/24 Range/Units





  15:23 


 


Transferrin  183.0 L  (204.0-354.0)  mg/dL


 


Ferritin  2224.0 H  (10.0-291.0)  ng/mL








                      Microbiology - Last 24 Hours (Table)











 10/09/24 19:05 Urine Culture - Final





 Urine,Voided 














Assessment and Plan


Assessment: 





-Acute metabolic encephalopathy with delirium, from underlying UTI with 

cystitis: Improving


Received 1 dose of ceftriaxone at [facility].  Continue with the same





-Acute UTI with cystitis


IV ceftriaxone.


Culture pending





-Fluid overload


Will get extra dose of hemodialysis/ultrafiltration today.  Has scheduled 

dialysis tomorrow.





-Chronic kidney disease minimal bone disease


Sevelamer





-Anemia of chronic kidney disease.


Follow labs





-Gastritis


PPI





-Essential hypertension with end-stage kidney disease


Amlodipine 5 mg twice daily, Toprol XL





-Hyperlipidemia


Lipitor





-Carotid artery stenosis: Moderate approaching severe stenosis right internal 

carotid artery 69%.  50-69% left internal carotid artery. 


Follow-up with Dr. Funez vascular


Aspirin.  Lipitor.





.


-End-stage renal disease(Monday Wednesday and Friday,


Right subclavian-dialysis access





-Peripheral neuropathy


Neurontin





-Primary osteoarthritis, multiple joints


Pain medications as needed


.





-COPD,


Symbicort.  Albuterol when necessary.  Singulair





-Chronic hypoxic respiratory failure underlying COPD


Has home oxygen





-Full code

## 2024-10-12 NOTE — P.PN
Subjective


Progress Note Date: 10/12/24





64-year-old patient, follows with Dr. Alyssa Harrison


 known end-stage kidney disease on hemodialysis Monday Wednesday and Friday.  

Chronic medical conditions include mineral bone disease, anemia of chronic ki

dney disease, severe osteoarthritis with chronic pain, uses a walker


Patient went for a scheduled dialysis.  She was found to be somewhat lethargic 

but alert self.  Sent down to [facility].  Patient is found to have 

severe UTI.  Patient is complaining of fever and chills for few days.  Also d

ysuria urinary frequency and urgency.  Did receive a dose of ceftriaxone there. 

Then transferred here because of hemodialysis.





--Patient remains somewhat slow to respond; unclear if patient is back to 

baseline


-- Remains on IV Rocephin; given elevated white blood count, positive UA and 

mental status change, plan to continue with IV Rocephin and make further 

recommendations once urine culture results are available





10/12.  Patient seen and examined.  Patient stated she does not feel the best to

day.  Complaining of lethargic and weakness





REVIEW OF SYSTEMS: 


CONSTITUTIONAL: No fever, no malaise,. 


CARDIOVASCULAR: No chest pain, no palpitations, no syncope. 


PULMONARY: No shortness of breath, no cough, 


GASTROINTESTINAL: No diarrhea, no nausea, no vomiting, no abdominal pain. 


NEUROLOGICAL: No headaches, no weakness, 





PHYSICAL EXAMINATION: 





GENERAL: The patient is alert and oriented x3, ill looking


HEENT: Pupils are round and equally reacting to light. EOMI. No scleral icterus.

No conjunctival pallor. Normocephalic, atraumatic. No pharyngeal erythema. No 

thyromegaly. 


CARDIOVASCULAR: S1 and S2 present. No murmurs, rubs, or gallops. 


PULMONARY: Chest is clear to auscultation, no wheezing or crackles. 


ABDOMEN: Soft, nontender, nondistended, normoactive bowel sounds. No palpable o

rganomegaly. 


MUSCULOSKELETAL: No joint swelling or deformity.


EXTREMITIES: No cyanosis, clubbing, or pedal edema. 


NEUROLOGICAL: Gross neurological examination did not reveal any focal deficits. 


SKIN: No rashes. 





Assessment and plan


-Acute metabolic encephalopathy with delirium, from underlying UTI with 

cystitis:


-Acute UTI with cystitis


Monitor vital signs


Monitor CBC


Monitor CMP


Follow-up on urine culture


IV ceftriaxone.


ID consulted





-Fluid overload


-Chronic kidney disease minimal bone disease


Continue dialysis per nephrology


Continue sevelamer





-Anemia of chronic kidney disease.


Monitor CBC





-Gastritis


PPI





-Essential hypertension with end-stage kidney disease


Amlodipine 5 mg twice daily, Toprol XL





-Hyperlipidemia


Lipitor





-Carotid artery stenosis: Moderate approaching severe stenosis right internal 

carotid artery 69%.  50-69% left internal carotid artery. 


Follow-up with Dr. Funez vascular


Aspirin.  Lipitor.





.


-End-stage renal disease(Monday Wednesday and Friday,


Right subclavian-dialysis access





-Peripheral neuropathy


Neurontin





-Primary osteoarthritis, multiple joints


Pain medications as needed


.





-COPD,


Symbicort.  Albuterol when necessary.  Singulair





-Chronic hypoxic respiratory failure underlying COPD


Has home oxygen





Labs and medication were reviewed..  Continue same treatment.  Continue with 

symptomatic treatment.  Resume home medication.  Monitor labs and vitals.  DVT 

and GI prophylaxis.  Further recommendations as per clinical course of the 

patient








Dictation was produced using dragon dictation software. please excuse any gramma

tical, word or spelling errors. 








Objective





- Vital Signs


Vital signs: 


                                   Vital Signs











Temp  98.2 F   10/12/24 07:45


 


Pulse  66   10/12/24 07:45


 


Resp  14   10/12/24 07:45


 


BP  121/70   10/12/24 07:45


 


Pulse Ox  99   10/12/24 07:45


 


FiO2      








                                 Intake & Output











 10/11/24 10/12/24 10/12/24





 18:59 06:59 18:59


 


Intake Total 400 590 


 


Output Total 5400  


 


Balance -5000 590 


 


Intake:   


 


  Oral  590 


 


  Hemodialysis 400  


 


Output:   


 


  Hemodialysis 2900  


 


  Hemodialysis Net Amount 2500  


 


Other:   


 


  Voiding Method   Bedside Commode


 


  # Voids 1 2 


 


  # Bowel Movements 1  














- Labs


CBC & Chem 7: 


                                 10/09/24 15:23





                                 10/09/24 15:23

## 2024-10-12 NOTE — P.CONS
History of Present Illness





- Reason for Consult


Consult date: 10/12/24


UTI


Requesting physician: Sohail Pnieda





- Chief Complaint


Mental status changes x few days





- History of Present Illness





Patient is a 64-year-old  female with a past medical history 

significant for COPD CVA TIA hypertension hyperlipidemia end-stage renal disease

on hemodialysis patient presenting to the hospital 4 days ago for evaluation of 

mental status changes in this patient with initial presentation to [facility] with the patient was diagnosed with a UTI and subsequently transferred 

to [facility] for further evaluation patient was lethargic but arousable

and denied any headache chest pain or abdominal pain no fever to the ER 

physician patient on presentation to the hospital has been afebrile except for 

low-grade fever of 99.3 degrees warm right patient was not tachycardic 

hypotensive or hypoxic and no need for supplemental oxygen patient did have a 

white count of 12.7 with a left shift and BUN and creatinine has been elevated 

urine has been positive cultures came back negative patient has been treated 

with ceftriaxone infectious disease was consulted today for further management 

of antibiotic therapy patient at time my evaluation was lethargic sleepy and did

not answer any question by the nursing staff this has been her baseline there is

no clear history of any nausea vomiting and nursing staff specifically denied 

any diarrhea has not medicated the patient makes any urine or not or if he has 

any urinary symptoms





Review of Systems





Positive points has been mentioned in HPI complete review could not be obtained 

because of his underlying mental status





Past Medical History


Past Medical History: Heart Failure, COPD, CVA/TIA, Dialysis, GERD/Reflux, 

Hyperlipidemia, Hypertension, Osteoarthritis (OA), Skin Disorder, Syncope, 

Thyroid Disorder


Additional Past Medical History / Comment(s): CKD, endometriosis, gastritis, 

carotid artery stenosis, Hemodialysis for 7.5 years, shingles-recurrent


History of Any Multi-Drug Resistant Organisms: MRSA


Year Discovered:: unk


MDRO Source:: left arm


Past Surgical History: Uterine Ablation


Additional Past Surgical History / Comment(s): Dialysis cath placement right 

subclavian, old fistula left arm- not in use,


Past Anesthesia/Blood Transfusion Reactions: No Reported Reaction


Past Psychological History: No Psychological Hx Reported


Smoking Status: Unknown if ever smoked


Past Alcohol Use History: None Reported


Past Drug Use History: None Reported





- Past Family History


  ** Father


Family Medical History: Diabetes Mellitus





  ** Mother


Family Medical History: Cancer, Congestive Heart Failure (CHF)


Additional Family Medical History / Comment(s): thyroid CA, 





Medications and Allergies


                                Home Medications











 Medication  Instructions  Recorded  Confirmed  Type


 


Albuterol Sulfate [Albuterol 1 puff INHALATION RT-Q4H PRN 08/14/23 10/09/24 

History





Sulfate Hfa]    


 


Atorvastatin Calcium 20 mg PO DAILY 08/14/23 10/09/24 History


 


Fluticasone Nasal Spray [Flonase 2 spray EA NOSTRIL DAILY 08/14/23 10/09/24 

History





Nasal Spray]    


 


Isosorbide Mononitrate ER [Imdur] 30 mg PO DAILY 08/14/23 10/09/24 History


 


Loperamide [Imodium] 4 mg PO QID PRN 08/14/23 10/09/24 History


 


Metoprolol Succinate (ER) [Toprol 25 mg PO BID 08/14/23 10/09/24 History





XL]    


 


Montelukast [Singulair] 10 mg PO DAILY 08/14/23 10/09/24 History


 


Omeprazole [PriLOSEC] 20 mg PO AC-SUPPER 08/14/23 10/09/24 History


 


Ascorbic Acid [Vitamin C] 500 mg PO DAILY 08/15/23 10/09/24 History


 


Aspirin EC [Ecotrin Low Dose] 81 mg PO DAILY 08/15/23 10/09/24 History


 


Bismuth Subsalicylate 525 - 1,050 mg PO TID PRN 08/15/23 10/09/24 History





[Pepto-Bismol Ultra]    


 


Cholecalciferol (Vitamin D3) 125 mcg PO DAILY 08/15/23 10/09/24 History





[Vitamin D3 (125 MCG = 5,000 IU)]    


 


Cranberry 15,000 Mg Tab 15,000 mg PO DAILY 08/15/23 10/09/24 History


 


Multivit-Min/FA/Lycopen/Lutein 1 tab PO DAILY 08/15/23 10/09/24 History





[Centrum Silver Tablet]    


 


Propylene Glycol [Systane Complete] 1 drop BOTH EYES HS 08/15/23 10/09/24 

History


 


Super B Complex 1 tab PO DAILY 08/15/23 10/09/24 History


 


Vitamin E (Dl,Tocopheryl Acet) 400 unit PO DAILY 08/15/23 10/09/24 History





[Vitamin E (400 Iu = 180 mg)]    


 


Bumetanide [BUMEX] 2 mg PO Q4H PRN 10/23/23 10/09/24 History


 


Midodrine [ProAmatine] 5 mg PO TID 10/23/23 10/10/24 History


 


Sevelamer Carbonate 1,600 mg PO TID-W/MEALS 10/23/23 10/10/24 History


 


amLODIPine [Norvasc] 5 mg PO BID 10/23/23 10/09/24 History


 


Cephalexin [Keflex] 500 mg PO Q12HR 10/09/24 10/09/24 History


 


Cinacalcet [Sensipar] 30 mg PO AS DIRECTED 10/09/24 10/09/24 History


 


DULoxetine HCL [Cymbalta] 30 mg PO DAILY 10/09/24 10/09/24 History


 


Dicyclomine [Bentyl] 10 mg PO TID 10/09/24 10/09/24 History


 


Gabapentin [Neurontin] 100 mg PO QID 10/09/24 10/09/24 History


 


Ketoconazole 2% Shampoo [Nizoral] 1 applic TOPICAL WEEKLY 10/09/24 10/09/24 

History


 


Melatonin [Melatonin ER] 5 - 10 mg PO HS PRN 10/09/24 10/09/24 History


 


Metoprolol Succinate (ER) [Toprol 50 mg PO DAILY 10/09/24 10/09/24 History





XL]    


 


Sevelamer [Renvela] 1,600 mg PO DAILY PRN 10/10/24 10/10/24 History








                                    Allergies











Allergy/AdvReac Type Severity Reaction Status Date / Time


 


erythromycin base Allergy  Rash/Hives Verified 10/09/24 16:53


 


furosemide [From Lasix] Allergy  Unknown Verified 10/09/24 16:53


 


green tea Allergy  Unknown Verified 10/09/24 16:53


 


Penicillins Allergy  Rash/Hives Verified 10/09/24 16:53


 


Sulfa (Sulfonamide Allergy  Anaphylaxis Verified 10/09/24 16:53





Antibiotics)     


 


sulfacetamide Allergy  Unknown Verified 10/09/24 16:53














Physical Exam


Vitals: 


                                   Vital Signs











  Temp Pulse Resp BP Pulse Ox


 


 10/12/24 14:06  98.1 F  72  16  122/70  100


 


 10/12/24 07:45  98.2 F  66  14  121/70  99


 


 10/12/24 01:37  97.9 F  70  16  100/63  97


 


 10/11/24 20:00  98.2 F  64  16  108/67  96








                                Intake and Output











 10/12/24 10/12/24 10/12/24





 06:59 14:59 22:59


 


Intake Total 590  


 


Balance 590  


 


Intake:   


 


  Oral 590  


 


Other:   


 


  Voiding Method  Bedside Commode 


 


  # Voids 2  














GENERAL DESCRIPTION: Middle-aged female lying in bed, no distress. No tachypnea 

or accessory muscle of respiration use.


HEENT: Shows Pallor , no scleral icterus. Oral mucous membrane is dry. 


NECK: Trachea central, no thyromegaly.


LUNGS: Unlabored breathing. Clear to auscultation anteriorly. No wheeze or 

crackle.


HEART: S1, S2, regular rate and rhythm. No loud murmur


ABDOMEN: Soft, no tenderness , guarding or rigidity, no organomegaly


EXTREMITIES: No edema of feet.


SKIN: No rash, no masses palpable.


NEUROLOGICAL: The patient is slightly lethargic orientation could not be 

determined





Results


CBC & Chem 7: 


                                 10/09/24 15:23





                                 10/09/24 15:23





Assessment and Plan


(1) Allergy to multiple antibiotics


Current Visit: Yes   Status: Acute   Code(s): Z88.1 - ALLERGY STATUS TO OTHER 

ANTIBIOTIC AGENTS   SNOMED Code(s): 798121870


   





(2) Altered mental status


Current Visit: Yes   Status: Acute   Code(s): R41.82 - ALTERED MENTAL STATUS, 

UNSPECIFIED   SNOMED Code(s): 361109753


   





(3) UTI (urinary tract infection)


Current Visit: Yes   Status: Acute   Code(s): N39.0 - URINARY TRACT INFECTION, 

SITE NOT SPECIFIED   SNOMED Code(s): 34270579


   


Plan: 





1patient presenting to the hospital mental status changes and this patient also

have elevated white count and noted to have significantly positive UA concerning

for urinary source patient at time my evaluation is sleepy lethargic and cannot 

provide any history as for his urinary symptoms patient did not have any fever 

except low-grade on admission no vomiting or diarrhea has been reported by the 

nursing staff or any issues with her dialysis access site


2-patient with multiple antibiotic ALLERGIES that would limit the number of 

antibiotic safe to use


3-we will check her CBC and a CRP with a.m. lab


4-for now continue with Rocephin while waiting for the workup to be completed


We will follow on clinical condition and cultures to further adjust medication 

if needed


Thank you for this consultation we will follow the patient along with you


Dictation was produced using dragon dictation software. please excuse any 

grammatical, word or spelling errors. 








Time with Patient: Greater than 30

## 2024-10-12 NOTE — P.PN
Subjective


Progress Note Date: 10/12/24


Patient is seen in follow-up for end-stage renal disease.  She is maintained on 

hemodialysis on Monday Wednesday Friday schedule.  





Vital signs are stable.


General: No acute distress.


HEENT: Head exam is unremarkable.  On nasal cannula.


LUNGS: No audible rhonchi or wheezes.


HEART: Rate and Rhythm are regular. 


ABDOMEN: Nontender.


EXTREMITITES: Lower extremities tender to touch.  1+ edema.








Objective





- Vital Signs


Vital signs: 


                                   Vital Signs











Temp  98.2 F   10/12/24 07:45


 


Pulse  66   10/12/24 07:45


 


Resp  14   10/12/24 07:45


 


BP  121/70   10/12/24 07:45


 


Pulse Ox  99   10/12/24 07:45


 


FiO2      








                                 Intake & Output











 10/11/24 10/12/24 10/12/24





 18:59 06:59 18:59


 


Intake Total 400 590 


 


Output Total 5400  


 


Balance -5000 590 


 


Intake:   


 


  Oral  590 


 


  Hemodialysis 400  


 


Output:   


 


  Hemodialysis 2900  


 


  Hemodialysis Net Amount 2500  


 


Other:   


 


  Voiding Method   Bedside Commode


 


  # Voids 1 2 


 


  # Bowel Movements 1  














- Labs


CBC & Chem 7: 


                                 10/09/24 15:23





                                 10/09/24 15:23





Assessment and Plan


Assessment: 


1.  End-stage renal disease maintained on hemodialysis on Monday Wednesday Friday schedule via permacath.


2.  UTI on antibiotics.


3.  Chronic diastolic CHF and moderate mitral regurgitation.


4.  Chronic kidney disease mineral bone disease maintained on Renvela and 

Sensipar.


5.  Volume overload.  Better with ultrafiltration.


6.  Anemia of chronic kidney disease.  Iron replete.





Plan:


Next HD treatment Monday.


Maintain Bumex.


Aranesp.


Hold amlodipine for systolic blood pressure less than 120.

## 2024-10-13 LAB
ALBUMIN SERPL-MCNC: 3.7 G/DL (ref 3.8–4.9)
ALBUMIN/GLOB SERPL: 1.85 RATIO (ref 1.6–3.17)
ALP SERPL-CCNC: 137 U/L (ref 41–126)
ALT SERPL-CCNC: 18 U/L (ref 8–44)
ANION GAP SERPL CALC-SCNC: 15.4 MMOL/L (ref 4–12)
AST SERPL-CCNC: 18 U/L (ref 13–35)
BASOPHILS # BLD AUTO: 0.06 X 10*3/UL (ref 0–0.1)
BASOPHILS NFR BLD AUTO: 0.9 %
BUN SERPL-SCNC: 52.7 MG/DL (ref 9–27)
BUN/CREAT SERPL: 9.76 RATIO (ref 12–20)
CALCIUM SPEC-MCNC: 8.5 MG/DL (ref 8.7–10.3)
CHLORIDE SERPL-SCNC: 95 MMOL/L (ref 96–109)
CO2 SERPL-SCNC: 24.6 MMOL/L (ref 21.6–31.8)
EOSINOPHIL # BLD AUTO: 0.43 X 10*3/UL (ref 0.04–0.35)
EOSINOPHIL NFR BLD AUTO: 6.3 %
ERYTHROCYTE [DISTWIDTH] IN BLOOD BY AUTOMATED COUNT: 2.66 X 10*6/UL (ref 4.1–5.2)
ERYTHROCYTE [DISTWIDTH] IN BLOOD: 17.2 % (ref 11.5–14.5)
GLOBULIN SER CALC-MCNC: 2 G/DL (ref 1.6–3.3)
GLUCOSE SERPL-MCNC: 89 MG/DL (ref 70–110)
HCT VFR BLD AUTO: 24.7 % (ref 37.2–46.3)
HGB BLD-MCNC: 7.9 G/DL (ref 12–15)
IMM GRANULOCYTES BLD QL AUTO: 2.9 %
LYMPHOCYTES # SPEC AUTO: 1.77 X 10*3/UL (ref 0.9–5)
LYMPHOCYTES NFR SPEC AUTO: 26.1 %
MCH RBC QN AUTO: 29.7 PG (ref 27–32)
MCHC RBC AUTO-ENTMCNC: 32 G/DL (ref 32–37)
MCV RBC AUTO: 92.9 FL (ref 80–97)
MONOCYTES # BLD AUTO: 0.73 X 10*3/UL (ref 0.2–1)
MONOCYTES NFR BLD AUTO: 10.8 %
NEUTROPHILS # BLD AUTO: 3.6 X 10*3/UL (ref 1.8–7.7)
NEUTROPHILS NFR BLD AUTO: 53 %
NRBC BLD AUTO-RTO: 0 X 10*3/UL (ref 0–0.01)
PLATELET # BLD AUTO: 246 X 10*3/UL (ref 140–440)
POTASSIUM SERPL-SCNC: 5.3 MMOL/L (ref 3.5–5.5)
PROT SERPL-MCNC: 5.7 G/DL (ref 6.2–8.2)
SODIUM SERPL-SCNC: 135 MMOL/L (ref 135–145)
WBC # BLD AUTO: 6.79 X 10*3/UL (ref 4.5–10)

## 2024-10-13 NOTE — P.PN
Subjective


Progress Note Date: 10/13/24


Principal diagnosis: 





Reason for consultation is a urinary tract infection





Patient is a 64-year-old  female with a past medical history 

significant for COPD CVA TIA hypertension hyperlipidemia end-stage renal disease

on hemodialysis patient presenting to the hospital for evaluation of mental 

status changes with initial evaluation at outside facility concerning for UTI 

head this patient still make some urine and was complaining of some burning 

urine and did have elevated white count admission.


On today's evaluation that is 10/13/2024,the patient remains to be afebrile, 

patient is more awake alert today, on room air not requiring supplemental oxygen

and denies any shortness of breath no chest pain or cough.Patient denies having 

any nausea or vomiting, no abdominal pain and no diarrhea has been reported.


Patient white normalized to 6.79, creatinine is 5.4 liver isms are normal








Objective





- Vital Signs


Vital signs: 


                                   Vital Signs











Temp  97.6 F   10/13/24 13:18


 


Pulse  56 L  10/13/24 13:18


 


Resp  16   10/13/24 13:18


 


BP  134/80   10/13/24 13:18


 


Pulse Ox  96   10/13/24 13:18


 


FiO2      








                                 Intake & Output











 10/12/24 10/13/24 10/13/24





 18:59 06:59 18:59


 


Intake Total  590 


 


Balance  590 


 


Intake:   


 


  Oral  590 


 


Other:   


 


  Voiding Method Bedside Commode Bedside Commode 


 


  # Voids 2 2 


 


  # Bowel Movements 1  














- Exam





GENERAL DESCRIPTION: Middle-age female lying in bed in no distress





RESPIRATORY SYSTEM: Unlabored breathing , decreased breath sounds at bases





HEART: S1 S2 regular rate and rhythm ,





ABDOMEN: Soft , no tenderness





EXTREMITIES: No edema feet





- Labs


CBC & Chem 7: 


                                 10/13/24 06:50





                                 10/13/24 06:50


Labs: 


                  Abnormal Lab Results - Last 24 Hours (Table)











  10/13/24 10/13/24 Range/Units





  06:50 06:50 


 


RBC  2.66 L   (4.10-5.20)  X 10*6/uL


 


Hgb  7.9 L   (12.0-15.0)  g/dL


 


Hct  24.7 L   (37.2-46.3)  %


 


RDW  17.2 H   (11.5-14.5)  %


 


Immature Gran #  0.20 H   (0.00-0.04)  X 10*3/uL


 


Eosinophils #  0.43 H   (0.04-0.35)  X 10*3/uL


 


Chloride   95 L  ()  mmol/L


 


Anion Gap   15.40 H  (4.00-12.00)  mmol/L


 


BUN   52.7 H  (9.0-27.0)  mg/dL


 


Creatinine   5.4 H  (0.6-1.5)  mg/dL


 


Est GFR (CKD-EPI)   8 L  (>=60)   


 


BUN/Creatinine Ratio   9.76 L  (12.00-20.00)  Ratio


 


Calcium   8.5 L  (8.7-10.3)  mg/dL


 


Total Bilirubin   <0.2 L  (0.3-1.2)  mg/dL


 


Alkaline Phosphatase   137 H  ()  U/L


 


C-Reactive Protein   7.30 H  (0.00-0.80)  mg/dL


 


Total Protein   5.7 L  (6.2-8.2)  g/dL


 


Albumin   3.7 L  (3.8-4.9)  g/dL














Assessment and Plan


(1) Allergy to multiple antibiotics


Current Visit: Yes   Status: Acute   Code(s): Z88.1 - ALLERGY STATUS TO OTHER 

ANTIBIOTIC AGENTS   SNOMED Code(s): 047614449


   





(2) Altered mental status


Current Visit: Yes   Status: Acute   Code(s): R41.82 - ALTERED MENTAL STATUS, 

UNSPECIFIED   SNOMED Code(s): 431808159


   





(3) UTI (urinary tract infection)


Current Visit: Yes   Status: Acute   Code(s): N39.0 - URINARY TRACT INFECTION, 

SITE NOT SPECIFIED   SNOMED Code(s): 47392523


   


Plan: 





1patient presenting to the hospital mental status changes and this patient also

have elevated white count and noted to have significantly positive UA concerning

for urinary source patient at time my evaluation is sleepy lethargic and cannot 

provide any history as for his urinary symptoms patient did not have any fever 

except low-grade on admission no vomiting or diarrhea has been reported by the 

nursing staff or any issues with her dialysis access site


2-patient with multiple antibiotic ALLERGIES that would limit the number of ant

ibiotic safe to use


3-patient did have normalization of the white count on Rocephin to continue will

transition to short course of oral Ceftin on discharge


Dictation was produced using dragon dictation software. please excuse any 

grammatical, word or spelling errors. 








Time with Patient: Less than 30

## 2024-10-13 NOTE — P.PN
Subjective


Progress Note Date: 10/13/24


Patient is seen in follow-up for end-stage renal disease.  She is maintained on 

hemodialysis on Monday Wednesday Friday schedule.  





Vital signs are stable.


General: No acute distress.


HEENT: Head exam is unremarkable.  On nasal cannula.


LUNGS: No audible rhonchi or wheezes.


HEART: Rate and Rhythm are regular. 


ABDOMEN: Nontender.


EXTREMITITES: Lower extremities tender to touch.  1+ edema.








Objective





- Vital Signs


Vital signs: 


                                   Vital Signs











Temp  97.5 F L  10/13/24 07:48


 


Pulse  59 L  10/13/24 07:48


 


Resp  17   10/13/24 07:48


 


BP  118/72   10/13/24 07:48


 


Pulse Ox  92 L  10/13/24 07:48


 


FiO2      








                                 Intake & Output











 10/12/24 10/13/24 10/13/24





 18:59 06:59 18:59


 


Intake Total  590 


 


Balance  590 


 


Intake:   


 


  Oral  590 


 


Other:   


 


  Voiding Method Bedside Commode Bedside Commode 


 


  # Voids 2 2 


 


  # Bowel Movements 1  














- Labs


CBC & Chem 7: 


                                 10/13/24 06:50





                                 10/13/24 06:50


Labs: 


                  Abnormal Lab Results - Last 24 Hours (Table)











  10/13/24 10/13/24 Range/Units





  06:50 06:50 


 


RBC  2.66 L   (4.10-5.20)  X 10*6/uL


 


Hgb  7.9 L   (12.0-15.0)  g/dL


 


Hct  24.7 L   (37.2-46.3)  %


 


RDW  17.2 H   (11.5-14.5)  %


 


Immature Gran #  0.20 H   (0.00-0.04)  X 10*3/uL


 


Eosinophils #  0.43 H   (0.04-0.35)  X 10*3/uL


 


Chloride   95 L  ()  mmol/L


 


Anion Gap   15.40 H  (4.00-12.00)  mmol/L


 


BUN   52.7 H  (9.0-27.0)  mg/dL


 


Creatinine   5.4 H  (0.6-1.5)  mg/dL


 


Est GFR (CKD-EPI)   8 L  (>=60)   


 


BUN/Creatinine Ratio   9.76 L  (12.00-20.00)  Ratio


 


Calcium   8.5 L  (8.7-10.3)  mg/dL


 


Total Bilirubin   <0.2 L  (0.3-1.2)  mg/dL


 


Alkaline Phosphatase   137 H  ()  U/L


 


C-Reactive Protein   7.30 H  (0.00-0.80)  mg/dL


 


Total Protein   5.7 L  (6.2-8.2)  g/dL


 


Albumin   3.7 L  (3.8-4.9)  g/dL














Assessment and Plan


Assessment: 


1.  End-stage renal disease maintained on hemodialysis on Monday Wednesday Friday schedule via permacath.


2.  UTI on antibiotics.


3.  Chronic diastolic CHF and moderate mitral regurgitation.


4.  Chronic kidney disease mineral bone disease maintained on Renvela and 

Sensipar.


5.  Volume overload.  Better with ultrafiltration.


6.  Anemia of chronic kidney disease.  Iron replete.





Plan:


Next HD treatment Monday.


Maintain Bumex.


Aranesp.


Hold amlodipine for systolic blood pressure less than 120.

## 2024-10-13 NOTE — P.PN
Subjective


Progress Note Date: 10/13/24





64-year-old patient, follows with Dr. Alyssa Harrison


 known end-stage kidney disease on hemodialysis Monday Wednesday and Friday.  

Chronic medical conditions include mineral bone disease, anemia of chronic ki

dney disease, severe osteoarthritis with chronic pain, uses a walker


Patient went for a scheduled dialysis.  She was found to be somewhat lethargic 

but alert self.  Sent down to [facility].  Patient is found to have 

severe UTI.  Patient is complaining of fever and chills for few days.  Also d

ysuria urinary frequency and urgency.  Did receive a dose of ceftriaxone there. 

Then transferred here because of hemodialysis.





--Patient remains somewhat slow to respond; unclear if patient is back to 

baseline


-- Remains on IV Rocephin; given elevated white blood count, positive UA and 

mental status change, plan to continue with IV Rocephin and make further 

recommendations once urine culture results are available





10/12.  Patient seen and examined.  Patient stated she does not feel the best to

day.  Complaining of lethargic and weakness


10/13.  Patient seen and examined.  No acute issues overnight.  Patient is 

afebrile





REVIEW OF SYSTEMS: 


CONSTITUTIONAL: No fever, no malaise,. 


CARDIOVASCULAR: No chest pain, no palpitations, no syncope. 


PULMONARY: No shortness of breath, no cough, 


GASTROINTESTINAL: No diarrhea, no nausea, no vomiting, no abdominal pain. 


NEUROLOGICAL: No headaches, no weakness, 





PHYSICAL EXAMINATION: 





GENERAL: The patient is alert and oriented x3, ill looking


HEENT: Pupils are round and equally reacting to light. EOMI. No scleral icterus.

No conjunctival pallor. Normocephalic, atraumatic. No pharyngeal erythema. No 

thyromegaly. 


CARDIOVASCULAR: S1 and S2 present. No murmurs, rubs, or gallops. 


PULMONARY: Chest is clear to auscultation, no wheezing or crackles. 


ABDOMEN: Soft, nontender, nondistended, normoactive bowel sounds. No palpable 

organomegaly. 


MUSCULOSKELETAL: No joint swelling or deformity.


EXTREMITIES: No cyanosis, clubbing, or pedal edema. 


NEUROLOGICAL: Gross neurological examination did not reveal any focal deficits. 


SKIN: No rashes. 





Assessment and plan


-Acute metabolic encephalopathy with delirium, from underlying UTI with 

cystitis:


-Acute UTI with cystitis


Monitor vital signs


Monitor CBC


Monitor CMP


Follow-up on urine culture


IV ceftriaxone.


ID following





-Fluid overload


-Chronic kidney disease minimal bone disease


Continue dialysis per nephrology


Continue sevelamer





-Anemia of chronic kidney disease.


Monitor CBC





-Gastritis


PPI





-Essential hypertension with end-stage kidney disease


Amlodipine 5 mg twice daily, Toprol XL





-Hyperlipidemia


Lipitor





-Carotid artery stenosis: Moderate approaching severe stenosis right internal 

carotid artery 69%.  50-69% left internal carotid artery. 


Follow-up with Dr. Funez vascular


Aspirin.  Lipitor.





.


-End-stage renal disease(Monday Wednesday and Friday,


Right subclavian-dialysis access





-Peripheral neuropathy


Neurontin





-Primary osteoarthritis, multiple joints


Pain medications as needed


.





-COPD,


Symbicort.  Albuterol when necessary.  Singulair





-Chronic hypoxic respiratory failure underlying COPD


Has home oxygen





Labs and medication were reviewed..  Continue same treatment.  Continue with 

symptomatic treatment.  Resume home medication.  Monitor labs and vitals.  DVT 

and GI prophylaxis.  Further recommendations as per clinical course of the 

patient








Dictation was produced using dragon dictation software. please excuse any 

grammatical, word or spelling errors. 








Objective





- Vital Signs


Vital signs: 


                                   Vital Signs











Temp  97.6 F   10/13/24 13:18


 


Pulse  56 L  10/13/24 13:18


 


Resp  16   10/13/24 13:18


 


BP  134/80   10/13/24 13:18


 


Pulse Ox  96   10/13/24 13:18


 


FiO2      








                                 Intake & Output











 10/12/24 10/13/24 10/13/24





 18:59 06:59 18:59


 


Intake Total  590 


 


Balance  590 


 


Intake:   


 


  Oral  590 


 


Other:   


 


  Voiding Method Bedside Commode Bedside Commode 


 


  # Voids 2 2 


 


  # Bowel Movements 1  














- Labs


CBC & Chem 7: 


                                 10/13/24 06:50





                                 10/13/24 06:50


Labs: 


                  Abnormal Lab Results - Last 24 Hours (Table)











  10/13/24 10/13/24 Range/Units





  06:50 06:50 


 


RBC  2.66 L   (4.10-5.20)  X 10*6/uL


 


Hgb  7.9 L   (12.0-15.0)  g/dL


 


Hct  24.7 L   (37.2-46.3)  %


 


RDW  17.2 H   (11.5-14.5)  %


 


Immature Gran #  0.20 H   (0.00-0.04)  X 10*3/uL


 


Eosinophils #  0.43 H   (0.04-0.35)  X 10*3/uL


 


Chloride   95 L  ()  mmol/L


 


Anion Gap   15.40 H  (4.00-12.00)  mmol/L


 


BUN   52.7 H  (9.0-27.0)  mg/dL


 


Creatinine   5.4 H  (0.6-1.5)  mg/dL


 


Est GFR (CKD-EPI)   8 L  (>=60)   


 


BUN/Creatinine Ratio   9.76 L  (12.00-20.00)  Ratio


 


Calcium   8.5 L  (8.7-10.3)  mg/dL


 


Total Bilirubin   <0.2 L  (0.3-1.2)  mg/dL


 


Alkaline Phosphatase   137 H  ()  U/L


 


C-Reactive Protein   7.30 H  (0.00-0.80)  mg/dL


 


Total Protein   5.7 L  (6.2-8.2)  g/dL


 


Albumin   3.7 L  (3.8-4.9)  g/dL

## 2024-10-14 NOTE — P.PN
Subjective





Patient is seen for follow-up for end-stage renal disease.


Seen on dialysis.


Tolerating treatment well.


No significant complaints.





Objective





- Vital Signs


Vital signs: 


                                   Vital Signs











Temp  97.6 F   10/14/24 12:57


 


Pulse  66   10/14/24 12:57


 


Resp  16   10/14/24 12:57


 


BP  145/77   10/14/24 12:57


 


Pulse Ox  94 L  10/14/24 12:57


 


FiO2      








                                 Intake & Output











 10/14/24 10/14/24 10/15/24





 06:59 18:59 06:59


 


Intake Total 780 6240 


 


Output Total  4800 


 


Balance 780 1440 


 


Intake:   


 


  Oral 780 1440 


 


  Hemodialysis  4800 


 


Output:   


 


  Hemodialysis  800 


 


  Hemodialysis Net Amount  4000 


 


Other:   


 


  Voiding Method Bedside Commode Bedside Commode 


 


  # Voids  1 


 


  # Bowel Movements  1 














- Exam





Patient is awake, comfortable, no acute distress.


Examination of the heart S1 and S2


Examination of the lungs decreased breath sounds at the bases


Abdomen is soft nontender


Examination of lower extremity shows edema 1+ bilaterally.  Chronic skin changes

noted.





- Labs


CBC & Chem 7: 


                                 10/13/24 06:50





                                 10/13/24 06:50





Assessment and Plan


Assessment: 





1.  End-stage renal disease maintained on hemodialysis on Monday Wednesday Friday schedule via permacath.


2.  UTI on antibiotics.


3.  Chronic diastolic CHF and moderate mitral regurgitation.


4.  Chronic kidney disease mineral bone disease maintained on Renvela and 

Sensipar.


5.  Volume overload.  Better with ultrafiltration.


6.  Anemia of chronic kidney disease.  Iron replete.


Plan: 





Continue with hemodialysis on Monday Wednesday Friday schedule.


Continue Bumex

## 2024-10-15 VITALS
DIASTOLIC BLOOD PRESSURE: 75 MMHG | RESPIRATION RATE: 18 BRPM | TEMPERATURE: 98 F | SYSTOLIC BLOOD PRESSURE: 144 MMHG | HEART RATE: 61 BPM

## 2024-10-15 LAB
ANION GAP SERPL CALC-SCNC: 14.5 MMOL/L (ref 4–12)
BASOPHILS # BLD MANUAL: 0.16 X 10*3/UL (ref 0–0.1)
BUN SERPL-SCNC: 32.6 MG/DL (ref 9–27)
BUN/CREAT SERPL: 8.15 RATIO (ref 12–20)
CALCIUM SPEC-MCNC: 8.7 MG/DL (ref 8.7–10.3)
CHLORIDE SERPL-SCNC: 95 MMOL/L (ref 96–109)
CO2 SERPL-SCNC: 27.5 MMOL/L (ref 21.6–31.8)
EOSINOPHIL # BLD MANUAL: 0.33 X 10*3/UL (ref 0.04–0.35)
ERYTHROCYTE [DISTWIDTH] IN BLOOD BY AUTOMATED COUNT: 2.95 X 10*6/UL (ref 4.1–5.2)
ERYTHROCYTE [DISTWIDTH] IN BLOOD: 17.2 % (ref 11.5–14.5)
GLUCOSE SERPL-MCNC: 85 MG/DL (ref 70–110)
HCT VFR BLD AUTO: 27.7 % (ref 37.2–46.3)
HGB BLD-MCNC: 8.7 G/DL (ref 12–15)
LYMPHOCYTES # BLD MANUAL: 1.63 X 10*3/UL (ref 0.9–5)
MCH RBC QN AUTO: 29.5 PG (ref 27–32)
MCHC RBC AUTO-ENTMCNC: 31.4 G/DL (ref 32–37)
MCV RBC AUTO: 93.9 FL (ref 80–97)
MONOCYTES # BLD MANUAL: 0.33 X 10*3/UL (ref 0.2–1)
NEUTROPHILS NFR BLD MANUAL: 67 %
NEUTS SEG # BLD MANUAL: 5.47 X 10*3/UL (ref 1.8–7.7)
NRBC BLD AUTO-RTO: 0 X 10*3/UL (ref 0–0.01)
PLATELET # BLD AUTO: 254 X 10*3/UL (ref 140–440)
POTASSIUM SERPL-SCNC: 5.3 MMOL/L (ref 3.5–5.5)
SODIUM SERPL-SCNC: 137 MMOL/L (ref 135–145)
WBC # BLD AUTO: 8.17 X 10*3/UL (ref 4.5–10)

## 2024-10-15 NOTE — PN
PROGRESS NOTE



DATE OF SERVICE:  10/14/2024



SUBJECTIVE:

This is a 64-year-old woman, who was admitted with fluid overload and metabolic

encephalopathy, is being closely monitored.  No chest pain. No palpitation.



OBJECTIVE:

VITAL SIGNS: Pulse is 66, blood pressure 140/70, respirations 16.

CHEST: Clear to auscultation.

CARDIOVASCULAR: S1, S2.

ABDOMEN: Soft.

NERVOUS SYSTEM: Nonfocal.



LABORATORY DATA:

Hemoglobin 7.9.



ASSESSMENT:

1. Acute urinary tract infection with cystitis present on admission.

2. Fluid overload.

3. Anemia, chronic.

4. History of renal failure, on hemodialysis.

5. Multiple complex medical issues.



RECOMMENDATIONS:

Recommend to continue current management and continue symptomatic treatment. Otherwise,

monitor closely. Repeat labs in the morning. Possible home with home care.





MMODL / IJN: [id number] / Job#: [id number]

## 2024-10-15 NOTE — P.DS
Providers


Date of admission: 


10/09/24 15:14





Expected date of discharge: 10/14/24


Attending physician: 


Melecio Navarrete





Consults: 





                                        





10/09/24 15:14


Consult Physician Routine 


   Consulting Provider: Sweetie Bone


   Consult Reason/Comments: ESRD


   Do you want consulting provider notified?: Yes





10/12/24 10:06


Consult Physician Routine 


   Consulting Provider: Marguerite White


   Consult Reason/Comments: UTI


   Do you want consulting provider notified?: Yes











Primary care physician: 


Alyssa Harrison MD





Hospital Course: 





Final diagnosis





Acute urinary tract infection with cystitis, present on admission


Fluid overload and missed dialysis


History of anemia, chronic


History of renal failure on hemodialysis 3 days a week


History of heart failure


History of COPD, not in exacerbation


History of CVA/TIA


GERD


Hyperlipidemia


Hypertension


History of osteoarthritis


GI prophylaxis


Obesity with a BMI of 32.9


DVT prophylaxis


Full code














Discharge disposition


Patient is being discharged in a stable condition with guarded prognosis to home

with continued home care.  Patient will follow-up with Dr. Hunter Shah in the 

outpatient setting upon discharge.  Patient is to continue with hemodialysis as 

scheduled.  Continue a course of Ceftin 500 mg twice daily for the next few days

to complete the course.  Total time taken is greater than 35 minutes.





Hospital course


This is a 64-year-old female who was recently admitted with volume overload 

missing dialysis and not feeling well.  Patient also with acute urinary tract 

infection being monitored with infectious disease following.  Patient maintained

on ceftriaxone and will continue oral Ceftin for short course on discharge as 

cultures finalized showing some normal genital jose and patient's clinical 

symptoms have improved.  Patient being followed by nephrology underwent 

hemodialysis today and will continue current regimen.  Patient is cleared and 

will be returning home with home care.  Patient has been instructed to follow-up

with primary care provider on discharge.  Please refer to other consultation 

notes for further HPI. Currently no reports of chest pain, shortness of breath, 

or palpitations.  Patient is afebrile.  No reports of nausea or vomiting and 

patient is tolerating diet.  Patient will be discharged home today.  Guarded 

prognosis and high risk for readmissions given significant comorbidities.





Physical exam:








Gen: This is a 64-year-old female who is awake, alert and oriented x 3, well-

developed, elderly appearing, ill-appearing, obese


HEENT: Head is atraumatic, normocephalic. Pupils equal, round. Sclerae is 

anicteric. 


NECK: Supple. No JVD. No lymphadenopathy. No thyromegaly. 


LUNGS: Diminished breath sounds bilaterally with a few scattered rhonchi noted. 

No intercostal retractions.


HEART: S1, S2 are muffled


ABDOMEN: Soft.  Obese.  Bowel sounds are present. No masses.  No tenderness.


EXTREMITIES: No pedal edema.  No calf tenderness.


NEUROLOGICAL: Patient is awake, alert and oriented x3. Cranial nerves 2 through 

12 are grossly intact.  Diffusely weak





Please refer to medication reconciliation sheet for a list of medications.





The impression and plan of care has been dictated by Shanell Smart Nurse Pr

actitioner as directed.





Dr. Angelo MD


I have performed a history and examination and MDM of this patient, discussed 

the same with the dictator, and  agree with the dictator's assessment and plan 

as written ,documented as a scribe. Based on total visit time,  I have performed

more than 50% of the visit.


Patient Condition at Discharge: Fair





Plan - Discharge Summary


Discharge Rx Participant: No


New Discharge Prescriptions: 


New


   cefUROXime axetiL [Ceftin] 500 mg PO BID 5 Days #10 tab


   Darbepoetin Jose Carlos [Aranesp] 40 mcg SQ Q7D 30 Days #4 each


   Metoclopramide [Reglan] 10 mg PO AC-TID PRN #20 tab


     PRN Reason: Nausea


   Acetaminophen Tab [Tylenol] 650 mg PO Q6HR PRN  tab


     PRN Reason: Mild Pain Or Fever > 100.5





Continue


   Montelukast [Singulair] 10 mg PO DAILY


   Atorvastatin Calcium 20 mg PO DAILY


   Fluticasone Nasal Spray [Flonase Nasal Spray] 2 spray EA NOSTRIL DAILY


   Loperamide [Imodium] 4 mg PO QID PRN


     PRN Reason: Diarrhea


   Cholecalciferol (Vitamin D3) [Vitamin D3 (125 MCG = 5,000 IU)] 125 mcg PO 

DAILY


   Super B Complex 1 tab PO DAILY


   Ascorbic Acid [Vitamin C] 500 mg PO DAILY


   Vitamin E (Dl,Tocopheryl Acet) [Vitamin E (400 Iu = 180 mg)] 400 unit PO 

DAILY


   Cranberry 15,000 Mg Tab 15,000 mg PO DAILY


   Midodrine [ProAmatine] 5 mg PO TID


   amLODIPine [Norvasc] 5 mg PO BID


   Sevelamer Carbonate 1,600 mg PO TID-W/MEALS


   Cinacalcet [Sensipar] 30 mg PO AS DIRECTED


   DULoxetine HCL [Cymbalta] 30 mg PO DAILY


   Ketoconazole 2% Shampoo [Nizoral] 1 applic TOPICAL WEEKLY


   Sevelamer [Renvela] 1,600 mg PO DAILY PRN


     PRN Reason: with snacks


   Albuterol Sulfate [Albuterol Sulfate Hfa] 1 puff INHALATION RT-Q4H PRN


     PRN Reason: Shortness Of Breath


   Omeprazole [PriLOSEC] 20 mg PO AC-SUPPER


   Isosorbide Mononitrate ER [Imdur] 30 mg PO DAILY


   Metoprolol Succinate (ER) [Toprol XL] 25 mg PO BID


   Propylene Glycol [Systane Complete] 1 drop BOTH EYES HS


   Aspirin EC [Ecotrin Low Dose] 81 mg PO DAILY


   Bismuth Subsalicylate [Pepto-Bismol Ultra] 525 - 1,050 mg PO TID PRN


     PRN Reason: Gi Upset


   Multivit-Min/FA/Lycopen/Lutein [Centrum Silver Tablet] 1 tab PO DAILY


   Bumetanide [BUMEX] 2 mg PO Q4H PRN


     PRN Reason: Edema


   Dicyclomine [Bentyl] 10 mg PO TID


   Gabapentin [Neurontin] 100 mg PO QID


   Melatonin [Melatonin ER] 5 - 10 mg PO HS PRN


     PRN Reason: Insomnia


   Metoprolol Succinate (ER) [Toprol XL] 50 mg PO DAILY





Discontinued


   Cephalexin [Keflex] 500 mg PO Q12HR


Discharge Medication List





Albuterol Sulfate [Albuterol Sulfate Hfa] 1 puff INHALATION RT-Q4H PRN 08/14/23 

[History]


Atorvastatin Calcium 20 mg PO DAILY 08/14/23 [History]


Fluticasone Nasal Spray [Flonase Nasal Spray] 2 spray EA NOSTRIL DAILY 08/14/23 

[History]


Isosorbide Mononitrate ER [Imdur] 30 mg PO DAILY 08/14/23 [History]


Loperamide [Imodium] 4 mg PO QID PRN 08/14/23 [History]


Metoprolol Succinate (ER) [Toprol XL] 25 mg PO BID 08/14/23 [History]


Montelukast [Singulair] 10 mg PO DAILY 08/14/23 [History]


Omeprazole [PriLOSEC] 20 mg PO AC-SUPPER 08/14/23 [History]


Ascorbic Acid [Vitamin C] 500 mg PO DAILY 08/15/23 [History]


Aspirin EC [Ecotrin Low Dose] 81 mg PO DAILY 08/15/23 [History]


Bismuth Subsalicylate [Pepto-Bismol Ultra] 525 - 1,050 mg PO TID PRN 08/15/23 

[History]


Cholecalciferol (Vitamin D3) [Vitamin D3 (125 MCG = 5,000 IU)] 125 mcg PO DAILY 

08/15/23 [History]


Cranberry 15,000 Mg Tab 15,000 mg PO DAILY 08/15/23 [History]


Multivit-Min/FA/Lycopen/Lutein [Centrum Silver Tablet] 1 tab PO DAILY 08/15/23 

[History]


Propylene Glycol [Systane Complete] 1 drop BOTH EYES HS 08/15/23 [History]


Super B Complex 1 tab PO DAILY 08/15/23 [History]


Vitamin E (Dl,Tocopheryl Acet) [Vitamin E (400 Iu = 180 mg)] 400 unit PO DAILY 

08/15/23 [History]


Bumetanide [BUMEX] 2 mg PO Q4H PRN 10/23/23 [History]


Midodrine [ProAmatine] 5 mg PO TID 10/23/23 [History]


Sevelamer Carbonate 1,600 mg PO TID-W/MEALS 10/23/23 [History]


amLODIPine [Norvasc] 5 mg PO BID 10/23/23 [History]


Cinacalcet [Sensipar] 30 mg PO AS DIRECTED 10/09/24 [History]


DULoxetine HCL [Cymbalta] 30 mg PO DAILY 10/09/24 [History]


Dicyclomine [Bentyl] 10 mg PO TID 10/09/24 [History]


Gabapentin [Neurontin] 100 mg PO QID 10/09/24 [History]


Ketoconazole 2% Shampoo [Nizoral] 1 applic TOPICAL WEEKLY 10/09/24 [History]


Melatonin [Melatonin ER] 5 - 10 mg PO HS PRN 10/09/24 [History]


Metoprolol Succinate (ER) [Toprol XL] 50 mg PO DAILY 10/09/24 [History]


Sevelamer [Renvela] 1,600 mg PO DAILY PRN 10/10/24 [History]


Acetaminophen Tab [Tylenol] 650 mg PO Q6HR PRN  tab 10/14/24 [Rx]


Darbepoetin Jose Carlos [Aranesp] 40 mcg SQ Q7D 30 Days #4 each 10/14/24 [Rx]


Metoclopramide [Reglan] 10 mg PO AC-TID PRN #20 tab 10/14/24 [Rx]


cefUROXime axetiL [Ceftin] 500 mg PO BID 5 Days #10 tab 10/14/24 [Rx]








Follow up Appointment(s)/Referral(s): 


[facility] [NON-STAFF] - 1 Week


Lew Mack MD [REFERRING] - 10/28/24 2:00 pm


(previously scheduled appt. 


scheduled as a new patient.)


Morales Lozano DO [STAFF PHYSICIAN] - 1 Week


(per the office, follow up at the dialysis center. 


Dialysis Monday/Wednesday/Friday per scheduled chair time. )


Patient Instructions/Handouts:  Cefuroxime (By mouth), Metoclopramide (By 

mouth), Darbepoetin Jose Carlos (By injection), Urinary Tract Infection in Women (DC)


Activity/Diet/Wound Care/Special Instructions: 


Activity limited until follow-up


Follow-up with primary care provider on discharge


Follow-up with nephrology and continue with your dialysis sessions as scheduled


Continue taking antibiotics for the next 5 days to complete the course





Discharge Disposition: HOME WITH HOME HEALTH SERVICES

## 2024-10-15 NOTE — P.PN
Subjective


Progress Note Date: 10/14/24


Principal diagnosis: 





Reason for consultation is a urinary tract infection





Patient is a 64-year-old  female with a past medical history 

significant for COPD CVA TIA hypertension hyperlipidemia end-stage renal disease

on hemodialysis patient presenting to the hospital for evaluation of mental 

status changes with initial evaluation at outside facility concerning for UTI 

head this patient still make some urine and was complaining of some burning 

urine and did have elevated white count admission.


On today's evaluation that is10/14/2024, the patient continues to be afebrile, 

the patient is on room air and breathing comfortably, the Pt denies having any 

chest pain or cough, the patient denies having any abdominal pain no vomiting or

any diarrhea has been reported by the nursing staff.


The patient white count is 8.17, creatinine 4.0














Objective





- Vital Signs


Vital signs: 


                                   Vital Signs











Temp  97.7 F   10/14/24 07:26


 


Pulse  54 L  10/14/24 07:26


 


Resp  16   10/14/24 07:26


 


BP  122/65   10/14/24 07:26


 


Pulse Ox  93 L  10/14/24 07:26


 


FiO2      








                                 Intake & Output











 10/13/24 10/14/24 10/14/24





 18:59 06:59 18:59


 


Intake Total 780 780 480


 


Balance 780 780 480


 


Intake:   


 


  Oral 780 780 480


 


Other:   


 


  Voiding Method  Bedside Commode Bedside Commode














- Exam





GENERAL DESCRIPTION: Middle-age female lying in bed in no distress





RESPIRATORY SYSTEM: Unlabored breathing , decreased breath sounds at bases





HEART: S1 S2 regular rate and rhythm ,





ABDOMEN: Soft , no tenderness





EXTREMITIES: No edema feet





- Labs


CBC & Chem 7: 


                                 10/15/24 04:41





                                 10/15/24 04:41





Assessment and Plan


(1) Allergy to multiple antibiotics


Status: Acute   Code(s): Z88.1 - ALLERGY STATUS TO OTHER ANTIBIOTIC AGENTS   

SNOMED Code(s): 019296268


   





(2) Altered mental status


Status: Acute   Code(s): R41.82 - ALTERED MENTAL STATUS, UNSPECIFIED   SNOMED 

Code(s): 660805778


   





(3) UTI (urinary tract infection)


Status: Acute   Code(s): N39.0 - URINARY TRACT INFECTION, SITE NOT SPECIFIED   

SNOMED Code(s): 81261677


   


Plan: 





1patient presenting to the hospital mental status changes and this patient also

have elevated white count and noted to have significantly positive UA concerning

for urinary source patient at time my evaluation is sleepy lethargic and cannot 

provide any history as for his urinary symptoms patient did not have any fever 

except low-grade on admission no vomiting or diarrhea has been reported by the 

nursing staff or any issues with her dialysis access site


2-patient with multiple antibiotic ALLERGIES that would limit the number of 

antibiotic safe to use


3-patient did have normalization of the white count and improvement in her 

symptoms to continue with Rocephin while inpatient and will transition to short 

course of oral Ceftin on discharge


Dictation was produced using dragon dictation software. please excuse any 

grammatical, word or spelling errors. 








Time with Patient: Less than 30

## 2024-11-18 ENCOUNTER — HOSPITAL ENCOUNTER (INPATIENT)
Dept: HOSPITAL 47 - EC | Age: 64
LOS: 5 days | Discharge: HOME HEALTH | DRG: 291 | End: 2024-11-23
Attending: HOSPITALIST | Admitting: HOSPITALIST
Payer: MEDICARE

## 2024-11-18 DIAGNOSIS — G62.9: ICD-10-CM

## 2024-11-18 DIAGNOSIS — N18.6: ICD-10-CM

## 2024-11-18 DIAGNOSIS — E07.9: ICD-10-CM

## 2024-11-18 DIAGNOSIS — Z88.2: ICD-10-CM

## 2024-11-18 DIAGNOSIS — D63.1: ICD-10-CM

## 2024-11-18 DIAGNOSIS — E78.5: ICD-10-CM

## 2024-11-18 DIAGNOSIS — R78.81: ICD-10-CM

## 2024-11-18 DIAGNOSIS — Z88.0: ICD-10-CM

## 2024-11-18 DIAGNOSIS — Z86.73: ICD-10-CM

## 2024-11-18 DIAGNOSIS — J96.11: ICD-10-CM

## 2024-11-18 DIAGNOSIS — N30.90: ICD-10-CM

## 2024-11-18 DIAGNOSIS — Z91.018: ICD-10-CM

## 2024-11-18 DIAGNOSIS — M15.9: ICD-10-CM

## 2024-11-18 DIAGNOSIS — I95.3: ICD-10-CM

## 2024-11-18 DIAGNOSIS — G89.29: ICD-10-CM

## 2024-11-18 DIAGNOSIS — K29.70: ICD-10-CM

## 2024-11-18 DIAGNOSIS — Z79.82: ICD-10-CM

## 2024-11-18 DIAGNOSIS — I65.21: ICD-10-CM

## 2024-11-18 DIAGNOSIS — I13.2: Primary | ICD-10-CM

## 2024-11-18 DIAGNOSIS — Z86.14: ICD-10-CM

## 2024-11-18 DIAGNOSIS — K21.9: ICD-10-CM

## 2024-11-18 DIAGNOSIS — J44.9: ICD-10-CM

## 2024-11-18 DIAGNOSIS — I50.9: ICD-10-CM

## 2024-11-18 LAB
ALBUMIN SERPL-MCNC: 3.4 G/DL (ref 3.5–5)
ALBUMIN/GLOB SERPL: 1.4 {RATIO}
ALP SERPL-CCNC: 113 U/L (ref 38–126)
ALT SERPL-CCNC: 12 U/L (ref 4–34)
ANION GAP SERPL CALC-SCNC: 7 MMOL/L
AST SERPL-CCNC: 18 U/L (ref 14–36)
BASOPHILS # BLD AUTO: 0 K/UL (ref 0–0.2)
BASOPHILS NFR BLD AUTO: 0 %
BUN SERPL-SCNC: 22 MG/DL (ref 7–17)
CALCIUM SPEC-MCNC: 8.1 MG/DL (ref 8.4–10.2)
CHLORIDE SERPL-SCNC: 98 MMOL/L (ref 98–107)
CO2 SERPL-SCNC: 30 MMOL/L (ref 22–30)
EOSINOPHIL # BLD AUTO: 0.1 K/UL (ref 0–0.7)
EOSINOPHIL NFR BLD AUTO: 0 %
ERYTHROCYTE [DISTWIDTH] IN BLOOD BY AUTOMATED COUNT: 3.11 M/UL (ref 3.8–5.4)
ERYTHROCYTE [DISTWIDTH] IN BLOOD: 15.9 % (ref 11.5–15.5)
GLOBULIN SER CALC-MCNC: 2.4 G/DL
GLUCOSE SERPL-MCNC: 85 MG/DL (ref 74–99)
HCT VFR BLD AUTO: 30.2 % (ref 34–46)
HGB BLD-MCNC: 9.5 GM/DL (ref 11.4–16)
LYMPHOCYTES # SPEC AUTO: 1 K/UL (ref 1–4.8)
LYMPHOCYTES NFR SPEC AUTO: 10 %
MCH RBC QN AUTO: 30.5 PG (ref 25–35)
MCHC RBC AUTO-ENTMCNC: 31.4 G/DL (ref 31–37)
MCV RBC AUTO: 97 FL (ref 80–100)
MONOCYTES # BLD AUTO: 0.6 K/UL (ref 0–1)
MONOCYTES NFR BLD AUTO: 6 %
NEUTROPHILS # BLD AUTO: 8.8 K/UL (ref 1.3–7.7)
NEUTROPHILS NFR BLD AUTO: 82 %
PLATELET # BLD AUTO: 146 K/UL (ref 150–450)
POTASSIUM SERPL-SCNC: 5.1 MMOL/L (ref 3.5–5.1)
PROT SERPL-MCNC: 5.8 G/DL (ref 6.3–8.2)
SODIUM SERPL-SCNC: 135 MMOL/L (ref 137–145)
WBC # BLD AUTO: 10.8 K/UL (ref 3.8–10.6)

## 2024-11-18 PROCEDURE — 99285 EMERGENCY DEPT VISIT HI MDM: CPT

## 2024-11-18 PROCEDURE — 83735 ASSAY OF MAGNESIUM: CPT

## 2024-11-18 PROCEDURE — 87040 BLOOD CULTURE FOR BACTERIA: CPT

## 2024-11-18 PROCEDURE — 71045 X-RAY EXAM CHEST 1 VIEW: CPT

## 2024-11-18 PROCEDURE — 83550 IRON BINDING TEST: CPT

## 2024-11-18 PROCEDURE — 80202 ASSAY OF VANCOMYCIN: CPT

## 2024-11-18 PROCEDURE — 90935 HEMODIALYSIS ONE EVALUATION: CPT

## 2024-11-18 PROCEDURE — 83540 ASSAY OF IRON: CPT

## 2024-11-18 PROCEDURE — 82728 ASSAY OF FERRITIN: CPT

## 2024-11-18 PROCEDURE — 84100 ASSAY OF PHOSPHORUS: CPT

## 2024-11-18 PROCEDURE — 94640 AIRWAY INHALATION TREATMENT: CPT

## 2024-11-18 PROCEDURE — 80053 COMPREHEN METABOLIC PANEL: CPT

## 2024-11-18 PROCEDURE — 85025 COMPLETE CBC W/AUTO DIFF WBC: CPT

## 2024-11-18 PROCEDURE — 94760 N-INVAS EAR/PLS OXIMETRY 1: CPT

## 2024-11-18 PROCEDURE — 80048 BASIC METABOLIC PNL TOTAL CA: CPT

## 2024-11-19 LAB
ALBUMIN SERPL-MCNC: 3.3 G/DL (ref 3.8–4.9)
ALBUMIN/GLOB SERPL: 1.57 RATIO (ref 1.6–3.17)
ALP SERPL-CCNC: 113 U/L (ref 41–126)
ALT SERPL-CCNC: 10 U/L (ref 8–44)
ANION GAP SERPL CALC-SCNC: 16.1 MMOL/L (ref 4–12)
AST SERPL-CCNC: 16 U/L (ref 13–35)
BASOPHILS # BLD AUTO: 0.04 X 10*3/UL (ref 0–0.1)
BASOPHILS NFR BLD AUTO: 0.5 %
BUN SERPL-SCNC: 30.5 MG/DL (ref 9–27)
BUN/CREAT SERPL: 6.35 RATIO (ref 12–20)
CALCIUM SPEC-MCNC: 8.2 MG/DL (ref 8.7–10.3)
CHLORIDE SERPL-SCNC: 97 MMOL/L (ref 96–109)
CO2 SERPL-SCNC: 24.9 MMOL/L (ref 21.6–31.8)
EOSINOPHIL # BLD AUTO: 0.17 X 10*3/UL (ref 0.04–0.35)
EOSINOPHIL NFR BLD AUTO: 2.3 %
ERYTHROCYTE [DISTWIDTH] IN BLOOD BY AUTOMATED COUNT: 2.89 X 10*6/UL (ref 4.1–5.2)
ERYTHROCYTE [DISTWIDTH] IN BLOOD: 15.5 % (ref 11.5–14.5)
GLOBULIN SER CALC-MCNC: 2.1 G/DL (ref 1.6–3.3)
GLUCOSE BLD-MCNC: 104 MG/DL (ref 70–110)
GLUCOSE SERPL-MCNC: 79 MG/DL (ref 70–110)
HCT VFR BLD AUTO: 29.3 % (ref 37.2–46.3)
HGB BLD-MCNC: 8.8 G/DL (ref 12–15)
IMM GRANULOCYTES BLD QL AUTO: 0.7 %
IRON SERPL-MCNC: 31 UG/DL (ref 50–170)
LYMPHOCYTES # SPEC AUTO: 1.16 X 10*3/UL (ref 0.9–5)
LYMPHOCYTES NFR SPEC AUTO: 15.5 %
MAGNESIUM SPEC-SCNC: 2.6 MG/DL (ref 1.5–2.4)
MCH RBC QN AUTO: 30.4 PG (ref 27–32)
MCHC RBC AUTO-ENTMCNC: 30 G/DL (ref 32–37)
MCV RBC AUTO: 101.4 FL (ref 80–97)
MONOCYTES # BLD AUTO: 0.99 X 10*3/UL (ref 0.2–1)
MONOCYTES NFR BLD AUTO: 13.2 %
NEUTROPHILS # BLD AUTO: 5.07 X 10*3/UL (ref 1.8–7.7)
NEUTROPHILS NFR BLD AUTO: 67.8 %
NRBC BLD AUTO-RTO: 0 X 10*3/UL (ref 0–0.01)
PLATELET # BLD AUTO: 154 X 10*3/UL (ref 140–440)
POTASSIUM SERPL-SCNC: 4.6 MMOL/L (ref 3.5–5.5)
PROT SERPL-MCNC: 5.4 G/DL (ref 6.2–8.2)
SODIUM SERPL-SCNC: 138 MMOL/L (ref 135–145)
TIBC SERPL-MCNC: 204 UG/DL (ref 228–460)
WBC # BLD AUTO: 7.48 X 10*3/UL (ref 4.5–10)

## 2024-11-19 PROCEDURE — 5A1D70Z PERFORMANCE OF URINARY FILTRATION, INTERMITTENT, LESS THAN 6 HOURS PER DAY: ICD-10-PCS

## 2024-11-19 RX ADMIN — ISOSORBIDE MONONITRATE SCH MG: 30 TABLET, EXTENDED RELEASE ORAL at 14:06

## 2024-11-19 RX ADMIN — MONTELUKAST SODIUM SCH MG: 10 TABLET, FILM COATED ORAL at 14:06

## 2024-11-19 RX ADMIN — SODIUM POLYSTYRENE SULFONATE SCH GM: 15 SUSPENSION ORAL; RECTAL at 14:08

## 2024-11-19 RX ADMIN — Medication SCH UNIT: at 14:07

## 2024-11-19 RX ADMIN — ASPIRIN 81 MG CHEWABLE TABLET SCH MG: 81 TABLET CHEWABLE at 14:05

## 2024-11-19 RX ADMIN — DEXTRAN 70 AND HYPROMELLOSE 2910 SCH DROPS: 1; 3 SOLUTION/ DROPS OPHTHALMIC at 22:43

## 2024-11-19 RX ADMIN — OXYCODONE HYDROCHLORIDE AND ACETAMINOPHEN SCH MG: 500 TABLET ORAL at 14:05

## 2024-11-19 RX ADMIN — METOPROLOL SUCCINATE SCH MG: 50 TABLET, EXTENDED RELEASE ORAL at 14:06

## 2024-11-19 RX ADMIN — DICYCLOMINE HYDROCHLORIDE SCH: 10 CAPSULE ORAL at 13:57

## 2024-11-19 RX ADMIN — FLUTICASONE PROPIONATE SCH SPRAY: 50 SPRAY, METERED NASAL at 14:07

## 2024-11-19 RX ADMIN — SEVELAMER CARBONATE SCH MG: 800 TABLET, FILM COATED ORAL at 12:00

## 2024-11-19 RX ADMIN — CEFDINIR SCH MG: 300 CAPSULE ORAL at 22:41

## 2024-11-19 RX ADMIN — GABAPENTIN SCH MG: 100 CAPSULE ORAL at 14:06

## 2024-11-19 RX ADMIN — ONDANSETRON PRN MG: 2 INJECTION INTRAMUSCULAR; INTRAVENOUS at 16:10

## 2024-11-19 RX ADMIN — ATORVASTATIN CALCIUM SCH MG: 20 TABLET, FILM COATED ORAL at 14:05

## 2024-11-19 RX ADMIN — THERA TABS SCH EACH: TAB at 14:05

## 2024-11-19 RX ADMIN — ACETAMINOPHEN PRN MG: 325 TABLET, FILM COATED ORAL at 14:15

## 2024-11-19 RX ADMIN — MIDODRINE HYDROCHLORIDE SCH: 5 TABLET ORAL at 13:45

## 2024-11-19 RX ADMIN — CHOLECALCIFEROL TAB 125 MCG (5000 UNIT) SCH MCG: 125 TAB at 14:06

## 2024-11-19 RX ADMIN — PANTOPRAZOLE SODIUM SCH MG: 40 TABLET, DELAYED RELEASE ORAL at 17:38

## 2024-11-19 RX ADMIN — BISMUTH SUBSALICYLATE PRN MG: 525 LIQUID ORAL at 14:31

## 2024-11-20 RX ADMIN — DARBEPOETIN ALFA SCH MCG: 40 INJECTION, SOLUTION INTRAVENOUS; SUBCUTANEOUS at 15:47

## 2024-11-20 RX ADMIN — NAPROXEN ONE MG: 250 TABLET ORAL at 22:27

## 2024-11-20 RX ADMIN — ISODIUM CHLORIDE PRN MG: 0.03 SOLUTION RESPIRATORY (INHALATION) at 09:39

## 2024-11-20 RX ADMIN — CINACALCET HYDROCHLORIDE SCH MG: 30 TABLET, COATED ORAL at 17:35

## 2024-11-20 RX ADMIN — SODIUM CHLORIDE ONE MLS/HR: 9 INJECTION, SOLUTION INTRAVENOUS at 17:40

## 2024-11-21 LAB
BASOPHILS # BLD AUTO: 0 K/UL (ref 0–0.2)
BASOPHILS NFR BLD AUTO: 1 %
EOSINOPHIL # BLD AUTO: 0.2 K/UL (ref 0–0.7)
EOSINOPHIL NFR BLD AUTO: 3 %
ERYTHROCYTE [DISTWIDTH] IN BLOOD BY AUTOMATED COUNT: 2.94 M/UL (ref 3.8–5.4)
ERYTHROCYTE [DISTWIDTH] IN BLOOD: 15.5 % (ref 11.5–15.5)
HCT VFR BLD AUTO: 28.9 % (ref 34–46)
HGB BLD-MCNC: 8.9 GM/DL (ref 11.4–16)
LYMPHOCYTES # SPEC AUTO: 0.9 K/UL (ref 1–4.8)
LYMPHOCYTES NFR SPEC AUTO: 16 %
MCH RBC QN AUTO: 30.5 PG (ref 25–35)
MCHC RBC AUTO-ENTMCNC: 31 G/DL (ref 31–37)
MCV RBC AUTO: 98.4 FL (ref 80–100)
MONOCYTES # BLD AUTO: 0.4 K/UL (ref 0–1)
MONOCYTES NFR BLD AUTO: 7 %
NEUTROPHILS # BLD AUTO: 3.9 K/UL (ref 1.3–7.7)
NEUTROPHILS NFR BLD AUTO: 70 %
PLATELET # BLD AUTO: 234 K/UL (ref 150–450)
WBC # BLD AUTO: 5.5 K/UL (ref 3.8–10.6)

## 2024-11-21 RX ADMIN — SODIUM CHLORIDE ONE MLS/HR: 9 INJECTION, SOLUTION INTRAVENOUS at 17:02

## 2024-11-23 VITALS — HEART RATE: 66 BPM | DIASTOLIC BLOOD PRESSURE: 67 MMHG | SYSTOLIC BLOOD PRESSURE: 113 MMHG | TEMPERATURE: 98.3 F

## 2024-11-23 VITALS — RESPIRATION RATE: 20 BRPM

## 2024-11-23 LAB
ANION GAP SERPL CALC-SCNC: 14.4 MMOL/L (ref 4–12)
BUN SERPL-SCNC: 33.4 MG/DL (ref 9–27)
BUN/CREAT SERPL: 9.03 RATIO (ref 12–20)
CALCIUM SPEC-MCNC: 8.2 MG/DL (ref 8.7–10.3)
CHLORIDE SERPL-SCNC: 93 MMOL/L (ref 96–109)
CO2 SERPL-SCNC: 25.6 MMOL/L (ref 21.6–31.8)
GLUCOSE SERPL-MCNC: 86 MG/DL (ref 70–110)
POTASSIUM SERPL-SCNC: 5 MMOL/L (ref 3.5–5.5)
SODIUM SERPL-SCNC: 133 MMOL/L (ref 135–145)

## 2025-06-18 ENCOUNTER — HOSPITAL ENCOUNTER (INPATIENT)
Dept: HOSPITAL 47 - EC | Age: 65
LOS: 13 days | Discharge: HOME HEALTH | DRG: 640 | End: 2025-07-01
Attending: HOSPITALIST | Admitting: HOSPITALIST
Payer: MEDICARE

## 2025-06-18 VITALS — BODY MASS INDEX: 30.2 KG/M2

## 2025-06-18 DIAGNOSIS — M89.8X8: ICD-10-CM

## 2025-06-18 DIAGNOSIS — K29.70: ICD-10-CM

## 2025-06-18 DIAGNOSIS — Z79.52: ICD-10-CM

## 2025-06-18 DIAGNOSIS — G89.29: ICD-10-CM

## 2025-06-18 DIAGNOSIS — Z88.1: ICD-10-CM

## 2025-06-18 DIAGNOSIS — E87.5: Primary | ICD-10-CM

## 2025-06-18 DIAGNOSIS — I87.2: ICD-10-CM

## 2025-06-18 DIAGNOSIS — J44.1: ICD-10-CM

## 2025-06-18 DIAGNOSIS — Z99.2: ICD-10-CM

## 2025-06-18 DIAGNOSIS — I87.333: ICD-10-CM

## 2025-06-18 DIAGNOSIS — I65.23: ICD-10-CM

## 2025-06-18 DIAGNOSIS — L97.812: ICD-10-CM

## 2025-06-18 DIAGNOSIS — E87.20: ICD-10-CM

## 2025-06-18 DIAGNOSIS — E78.5: ICD-10-CM

## 2025-06-18 DIAGNOSIS — Z88.0: ICD-10-CM

## 2025-06-18 DIAGNOSIS — R53.81: ICD-10-CM

## 2025-06-18 DIAGNOSIS — D63.1: ICD-10-CM

## 2025-06-18 DIAGNOSIS — Z79.82: ICD-10-CM

## 2025-06-18 DIAGNOSIS — G62.9: ICD-10-CM

## 2025-06-18 DIAGNOSIS — L97.822: ICD-10-CM

## 2025-06-18 DIAGNOSIS — J96.21: ICD-10-CM

## 2025-06-18 DIAGNOSIS — Z82.49: ICD-10-CM

## 2025-06-18 DIAGNOSIS — I13.2: ICD-10-CM

## 2025-06-18 DIAGNOSIS — J20.9: ICD-10-CM

## 2025-06-18 DIAGNOSIS — N18.6: ICD-10-CM

## 2025-06-18 DIAGNOSIS — Z79.899: ICD-10-CM

## 2025-06-18 DIAGNOSIS — L29.9: ICD-10-CM

## 2025-06-18 DIAGNOSIS — I65.21: ICD-10-CM

## 2025-06-18 DIAGNOSIS — J44.0: ICD-10-CM

## 2025-06-18 DIAGNOSIS — I50.32: ICD-10-CM

## 2025-06-18 DIAGNOSIS — M19.91: ICD-10-CM

## 2025-06-18 DIAGNOSIS — Z91.158: ICD-10-CM

## 2025-06-18 DIAGNOSIS — Z86.73: ICD-10-CM

## 2025-06-18 LAB
ALBUMIN SERPL-MCNC: 4 G/DL (ref 3.5–5)
ALP SERPL-CCNC: 102 U/L (ref 38–126)
ALT SERPL-CCNC: 17 U/L (ref 4–34)
ANION GAP SERPL CALC-SCNC: 20 MMOL/L
ANISOCYTOSIS BLD QL SMEAR: (no result)
AST SERPL-CCNC: 27 U/L (ref 14–36)
BASO STIPL BLD QL SMEAR: (no result)
BASOPHILS # BLD AUTO: 0.08 10*3/UL (ref 0–0.1)
BASOPHILS NFR BLD AUTO: 0.8 %
BILIRUB BLD-MCNC: 1 MG/DL (ref 0.2–1.3)
BUN SERPL-SCNC: 77 MG/DL (ref 7–17)
BURR CELLS BLD QL SMEAR: (no result)
CALCIUM SPEC-MCNC: 8.7 MG/DL (ref 8.4–10.2)
CHLORIDE SERPL-SCNC: 102 MMOL/L (ref 98–107)
CO2 SERPL-SCNC: 14 MMOL/L (ref 22–30)
CREATININE: 8.23 MG/DL (ref 0.52–1.04)
DACRYOCYTES BLD QL SMEAR: (no result)
DOHLE BOD BLD QL SMEAR: (no result)
EOSINOPHIL # BLD AUTO: 0.17 10*3/UL (ref 0.04–0.35)
EOSINOPHIL NFR BLD AUTO: 1.6 %
ERYTHROCYTE [DISTWIDTH] IN BLOOD BY AUTOMATED COUNT: 3.18 10*6/UL (ref 4.1–5.2)
ERYTHROCYTE [DISTWIDTH] IN BLOOD: 17.8 % (ref 11.5–14.5)
GLUCOSE BLD-MCNC: 67 MG/DL (ref 70–110)
GLUCOSE BLD-MCNC: 77 MG/DL (ref 70–110)
GLUCOSE SERPL-MCNC: 53 MG/DL (ref 74–99)
HCT VFR BLD AUTO: 29.6 % (ref 37.2–46.3)
HGB BLD-MCNC: 9.1 G/DL (ref 12–15)
HOWELL-JOLLY BOD BLD QL SMEAR: (no result)
HYPOCHROMIA BLD QL SMEAR: (no result)
IMM GRANULOCYTES # BLD: 0.06 10*3/UL (ref 0–0.04)
LG PLATELETS BLD QL SMEAR: (no result)
LYMPHOCYTES # SPEC AUTO: 0.85 10*3/UL (ref 0.9–5)
LYMPHOCYTES NFR SPEC AUTO: 8.2 %
Lab: (no result)
MAGNESIUM SPEC-SCNC: 3.1 MG/DL (ref 1.6–2.3)
MCH RBC QN AUTO: 28.6 PG (ref 27–32)
MCHC RBC AUTO-ENTMCNC: 30.7 G/DL (ref 32–37)
MCV RBC AUTO: 93.1 FL (ref 80–97)
MONOCYTES # BLD AUTO: 0.71 10*3/UL (ref 0.2–1)
MONOCYTES NFR BLD AUTO: 6.9 %
NEUTROPHILS # BLD AUTO: 8.48 10*3/UL (ref 1.8–7.7)
NEUTROPHILS NFR BLD AUTO: 81.9 %
NEUTS HYPERSEG # BLD: (no result) 10*3/UL
NRBC BLD AUTO-RTO: (no result) %
OVALOCYTES BLD QL SMEAR: (no result)
PELGER HUET CELLS BLD QL SMEAR: (no result)
PLATELET # BLD AUTO: 162 10*3/UL (ref 140–440)
PLATELETS.RETICULATED NFR BLD AUTO: 2.9 % (ref 1.1–6.1)
PMV BLD AUTO: 11.9 FL (ref 9.5–12.2)
POIKILOCYTOSIS BLD QL SMEAR: (no result)
POIKILOCYTOSIS BLD QL SMEAR: (no result)
POLYCHROMASIA BLD QL SMEAR: (no result)
POTASSIUM SERPL-SCNC: 8.1 MMOL/L (ref 3.5–5.1)
PROT SERPL-MCNC: 6.8 G/DL (ref 6.3–8.2)
RBC MORPH BLD: (no result)
ROULEAUX BLD QL SMEAR: (no result)
SCHISTOCYTES BLD QL SMEAR: (no result)
SICKLE CELLS BLD QL SMEAR: (no result)
SODIUM SERPL-SCNC: 136 MMOL/L (ref 137–145)
SPHEROCYTES BLD QL SMEAR: (no result)
STOMATOCYTES BLD QL SMEAR: (no result)
TARGETS BLD QL SMEAR: (no result)
TOXIC GRANULES BLD QL SMEAR: (no result)
VARIANT LYMPHS BLD QL SMEAR: (no result)
WBC # BLD AUTO: 10.35 10*3/UL (ref 4.5–10)
WBC NRBC COR # BLD: (no result) K/UL
WBC TOXIC VACUOLES BLD QL SMEAR: (no result)

## 2025-06-18 PROCEDURE — 93005 ELECTROCARDIOGRAM TRACING: CPT

## 2025-06-18 PROCEDURE — 85025 COMPLETE CBC W/AUTO DIFF WBC: CPT

## 2025-06-18 PROCEDURE — 96374 THER/PROPH/DIAG INJ IV PUSH: CPT

## 2025-06-18 PROCEDURE — 83735 ASSAY OF MAGNESIUM: CPT

## 2025-06-18 PROCEDURE — 36415 COLL VENOUS BLD VENIPUNCTURE: CPT

## 2025-06-18 PROCEDURE — 96376 TX/PRO/DX INJ SAME DRUG ADON: CPT

## 2025-06-18 PROCEDURE — 96375 TX/PRO/DX INJ NEW DRUG ADDON: CPT

## 2025-06-18 PROCEDURE — 94760 N-INVAS EAR/PLS OXIMETRY 1: CPT

## 2025-06-18 PROCEDURE — 82805 BLOOD GASES W/O2 SATURATION: CPT

## 2025-06-18 PROCEDURE — 93306 TTE W/DOPPLER COMPLETE: CPT

## 2025-06-18 PROCEDURE — 80053 COMPREHEN METABOLIC PANEL: CPT

## 2025-06-18 PROCEDURE — 36600 WITHDRAWAL OF ARTERIAL BLOOD: CPT

## 2025-06-18 PROCEDURE — 87636 SARSCOV2 & INF A&B AMP PRB: CPT

## 2025-06-18 PROCEDURE — 71045 X-RAY EXAM CHEST 1 VIEW: CPT

## 2025-06-18 PROCEDURE — 85027 COMPLETE CBC AUTOMATED: CPT

## 2025-06-18 PROCEDURE — 86706 HEP B SURFACE ANTIBODY: CPT

## 2025-06-18 PROCEDURE — 84132 ASSAY OF SERUM POTASSIUM: CPT

## 2025-06-18 PROCEDURE — 84145 PROCALCITONIN (PCT): CPT

## 2025-06-18 PROCEDURE — 94640 AIRWAY INHALATION TREATMENT: CPT

## 2025-06-18 PROCEDURE — 83540 ASSAY OF IRON: CPT

## 2025-06-18 PROCEDURE — 84100 ASSAY OF PHOSPHORUS: CPT

## 2025-06-18 PROCEDURE — 87340 HEPATITIS B SURFACE AG IA: CPT

## 2025-06-18 PROCEDURE — 90935 HEMODIALYSIS ONE EVALUATION: CPT

## 2025-06-18 PROCEDURE — 71046 X-RAY EXAM CHEST 2 VIEWS: CPT

## 2025-06-18 PROCEDURE — 82728 ASSAY OF FERRITIN: CPT

## 2025-06-18 PROCEDURE — 99291 CRITICAL CARE FIRST HOUR: CPT

## 2025-06-18 PROCEDURE — 80048 BASIC METABOLIC PNL TOTAL CA: CPT

## 2025-06-18 PROCEDURE — 83550 IRON BINDING TEST: CPT

## 2025-06-18 RX ADMIN — DEXTROSE MONOHYDRATE STA ML: 25 INJECTION, SOLUTION INTRAVENOUS at 18:41

## 2025-06-18 RX ADMIN — CALCIUM GLUCONATE ONE MLS/HR: 10 INJECTION, SOLUTION INTRAVENOUS at 17:11

## 2025-06-18 RX ADMIN — SODIUM ZIRCONIUM CYCLOSILICATE ONE GM: 10 POWDER, FOR SUSPENSION ORAL at 17:14

## 2025-06-18 RX ADMIN — CEFAZOLIN SCH: 330 INJECTION, POWDER, FOR SOLUTION INTRAMUSCULAR; INTRAVENOUS at 21:06

## 2025-06-18 RX ADMIN — DEXTROSE MONOHYDRATE SCH MLS/HR: 100 INJECTION, SOLUTION INTRAVENOUS at 17:14

## 2025-06-18 RX ADMIN — INSULIN HUMAN ONE UNIT: 100 INJECTION, SOLUTION PARENTERAL at 17:04

## 2025-06-18 RX ADMIN — SODIUM BICARBONATE ONE ML: 84 INJECTION, SOLUTION INTRAVENOUS at 17:06

## 2025-06-18 RX ADMIN — DEXTROSE MONOHYDRATE ONE ML: 25 INJECTION, SOLUTION INTRAVENOUS at 17:05

## 2025-06-18 RX ADMIN — ISODIUM CHLORIDE ONE MG: 0.03 SOLUTION RESPIRATORY (INHALATION) at 17:36

## 2025-06-19 LAB
GLUCOSE BLD-MCNC: 102 MG/DL (ref 70–110)
GLUCOSE BLD-MCNC: 114 MG/DL (ref 70–110)
GLUCOSE BLD-MCNC: 114 MG/DL (ref 70–110)
GLUCOSE BLD-MCNC: 80 MG/DL (ref 70–110)
HBV SURFACE AB SERPL IA-ACNC: 490 MIU/ML
HBV SURFACE AG TISS QL IMSTN: NONREACTIVE
IRON SATN MFR SERPL: 27.47 % (ref 12–45)
IRON SERPL-MCNC: 50 UG/DL (ref 50–170)
PHOSPHATE SERPL-MCNC: 5.6 MG/DL (ref 2.5–4.5)
TIBC SERPL-MCNC: 182 UG/DL (ref 228–460)

## 2025-06-19 PROCEDURE — 5A1D70Z PERFORMANCE OF URINARY FILTRATION, INTERMITTENT, LESS THAN 6 HOURS PER DAY: ICD-10-PCS

## 2025-06-19 RX ADMIN — ISOSORBIDE MONONITRATE SCH MG: 30 TABLET, EXTENDED RELEASE ORAL at 18:48

## 2025-06-19 RX ADMIN — ONDANSETRON PRN MG: 2 INJECTION INTRAMUSCULAR; INTRAVENOUS at 18:39

## 2025-06-19 RX ADMIN — ASPIRIN 81 MG CHEWABLE TABLET SCH MG: 81 TABLET CHEWABLE at 21:17

## 2025-06-19 RX ADMIN — DICYCLOMINE HYDROCHLORIDE SCH MG: 10 CAPSULE ORAL at 18:48

## 2025-06-19 RX ADMIN — DULOXETINE SCH MG: 30 CAPSULE, DELAYED RELEASE ORAL at 18:48

## 2025-06-19 RX ADMIN — ACETAMINOPHEN PRN MG: 500 TABLET ORAL at 09:55

## 2025-06-19 RX ADMIN — ATORVASTATIN CALCIUM SCH MG: 20 TABLET, FILM COATED ORAL at 21:17

## 2025-06-19 RX ADMIN — SODIUM POLYSTYRENE SULFONATE SCH GM: 15 SUSPENSION ORAL; RECTAL at 21:33

## 2025-06-19 RX ADMIN — PANTOPRAZOLE SODIUM SCH MG: 40 TABLET, DELAYED RELEASE ORAL at 18:48

## 2025-06-19 RX ADMIN — AMLODIPINE BESYLATE SCH MG: 5 TABLET ORAL at 18:48

## 2025-06-19 RX ADMIN — MONTELUKAST SODIUM SCH MG: 10 TABLET, FILM COATED ORAL at 18:48

## 2025-06-19 RX ADMIN — BUMETANIDE SCH MG: 1 TABLET ORAL at 21:17

## 2025-06-19 RX ADMIN — OXYCODONE HYDROCHLORIDE AND ACETAMINOPHEN SCH MG: 500 TABLET ORAL at 19:08

## 2025-06-19 RX ADMIN — METOCLOPRAMIDE STA MG: 5 INJECTION, SOLUTION INTRAMUSCULAR; INTRAVENOUS at 09:56

## 2025-06-19 RX ADMIN — GABAPENTIN PRN MG: 100 CAPSULE ORAL at 23:49

## 2025-06-19 RX ADMIN — SEVELAMER CARBONATE SCH MG: 800 TABLET, FILM COATED ORAL at 18:48

## 2025-06-19 RX ADMIN — METOPROLOL SUCCINATE SCH MG: 25 TABLET, EXTENDED RELEASE ORAL at 21:18

## 2025-06-20 LAB
GLUCOSE BLD-MCNC: 110 MG/DL (ref 70–110)
GLUCOSE BLD-MCNC: 116 MG/DL (ref 70–110)
GLUCOSE BLD-MCNC: 124 MG/DL (ref 70–110)

## 2025-06-20 RX ADMIN — ASPIRIN SCH: 325 TABLET ORAL at 13:42

## 2025-06-20 RX ADMIN — CHOLECALCIFEROL TAB 125 MCG (5000 UNIT) SCH MCG: 125 TAB at 13:24

## 2025-06-20 RX ADMIN — MECLIZINE SCH MG: 12.5 TABLET ORAL at 16:00

## 2025-06-21 LAB
FLUAV RNA SPEC QL NAA+PROBE: NOT DETECTED
FLUBV RNA SPEC QL NAA+PROBE: NOT DETECTED
PLATELETS.RETICULATED NFR BLD AUTO: (no result) %
RSV RNA SPEC QL NAA+PROBE: NOT DETECTED
SARS-COV-2 RNA RESP QL NAA+PROBE: NOT DETECTED

## 2025-06-21 RX ADMIN — IPRATROPIUM BROMIDE AND ALBUTEROL SULFATE PRN ML: .5; 3 SOLUTION RESPIRATORY (INHALATION) at 15:57

## 2025-06-21 RX ADMIN — FLUTICASONE PROPIONATE SCH SPRAY: 50 SPRAY, METERED NASAL at 16:31

## 2025-06-21 RX ADMIN — BENZOCAINE AND MENTHOL PRN EACH: 15; 3.6 LOZENGE ORAL at 08:37

## 2025-06-22 LAB
ANION GAP SERPL CALC-SCNC: 11 MMOL/L
BASE EXCESS BLDA CALC-SCNC: 8.1 MMOL/L
BUN SERPL-SCNC: 26 MG/DL (ref 7–17)
CA-I BLDA-MCNC: (no result) MG/DL (ref 4.5–5.3)
CALCIUM SPEC-MCNC: 8.8 MG/DL (ref 8.4–10.2)
CHLORIDE SERPL-SCNC: 89 MMOL/L (ref 98–107)
CO2 BLDA-SCNC: 36 MMOL/L (ref 19–24)
CO2 SERPL-SCNC: 33 MMOL/L (ref 22–30)
CREATININE: 4.06 MG/DL (ref 0.52–1.04)
ERYTHROCYTE [DISTWIDTH] IN BLOOD BY AUTOMATED COUNT: 3.19 10*6/UL (ref 4.1–5.2)
ERYTHROCYTE [DISTWIDTH] IN BLOOD: 16.2 % (ref 11.5–14.5)
GAS PNL BLDA: 8.7 GM/DL (ref 11.4–16)
GLUCOSE BLD-MCNC: 119 MG/DL (ref 70–110)
GLUCOSE BLDA-MCNC: (no result) MG/DL (ref 75–99)
GLUCOSE SERPL-MCNC: 98 MG/DL (ref 74–99)
HCO3 BLDA-SCNC: 34 MMOL/L (ref 21–25)
HCT VFR BLD AUTO: 29.9 % (ref 37.2–46.3)
HCT VFR BLDA CALC: (no result) % (ref 34–46)
HGB BLD-MCNC: 9 G/DL (ref 12–15)
INHALED O2 CONCENTRATION: 28 %
LACTATE BLDA-MCNC: (no result) MMOL/L (ref 0.5–1.6)
MCH RBC QN AUTO: 28.2 PG (ref 27–32)
MCHC RBC AUTO-ENTMCNC: 30.1 G/DL (ref 32–37)
MCV RBC AUTO: 93.7 FL (ref 80–97)
PCO2 BLDA: 56 MMHG (ref 35–45)
PCO2 TEMP ADJ BLDA: (no result) MM[HG]
PH BLDA: 7.39 [PH] (ref 7.35–7.45)
PH TEMP ADJ BLDA: (no result) [PH]
PLATELET # BLD AUTO: 220 10*3/UL (ref 140–440)
PMV BLD AUTO: 10.4 FL (ref 9.5–12.2)
PO2 BLDA: 55 MMHG (ref 83–108)
PO2 TEMP ADJ BLDA: (no result) MM[HG]
POTASSIUM BLDA-SCNC: (no result) MMOL/L (ref 3.4–4.5)
POTASSIUM SERPL-SCNC: 3.7 MMOL/L (ref 3.5–5.1)
SAO2 % BLDA: 88.2 % (ref 94–97)
SODIUM BLDA-SCNC: (no result) MMOL/L (ref 135–146)
SODIUM SERPL-SCNC: 133 MMOL/L (ref 137–145)
WBC # BLD AUTO: 9.48 10*3/UL (ref 4.5–10)

## 2025-06-22 RX ADMIN — ISODIUM CHLORIDE PRN MG: 0.03 SOLUTION RESPIRATORY (INHALATION) at 21:23

## 2025-06-22 RX ADMIN — DARBEPOETIN ALFA SCH MCG: 40 INJECTION, SOLUTION INTRAVENOUS; SUBCUTANEOUS at 12:45

## 2025-06-23 LAB
ANION GAP SERPL CALC-SCNC: 9 MMOL/L
BUN SERPL-SCNC: 41 MG/DL (ref 7–17)
CALCIUM SPEC-MCNC: 8.7 MG/DL (ref 8.4–10.2)
CHLORIDE SERPL-SCNC: 91 MMOL/L (ref 98–107)
CO2 SERPL-SCNC: 34 MMOL/L (ref 22–30)
CREATININE: 5.59 MG/DL (ref 0.52–1.04)
GLUCOSE SERPL-MCNC: 113 MG/DL (ref 74–99)
POTASSIUM SERPL-SCNC: 3.7 MMOL/L (ref 3.5–5.1)
SODIUM SERPL-SCNC: 134 MMOL/L (ref 137–145)

## 2025-06-23 RX ADMIN — LIDOCAINE SCH PATCH: 4 PATCH TOPICAL at 16:51

## 2025-06-23 RX ADMIN — BENZONATATE PRN MG: 100 CAPSULE ORAL at 01:45

## 2025-06-24 LAB — GLUCOSE BLD-MCNC: 83 MG/DL (ref 70–110)

## 2025-06-24 RX ADMIN — CALCIUM CARBONATE (ANTACID) CHEW TAB 500 MG PRN MG: 500 CHEW TAB at 08:55

## 2025-06-26 RX ADMIN — FLUCONAZOLE ONE MG: 100 TABLET ORAL at 15:50

## 2025-06-26 RX ADMIN — NYSTATIN SCH UNIT: 100000 SUSPENSION ORAL at 17:36

## 2025-06-28 LAB
ACANTHOCYTES BLD QL SMEAR: (no result)
ACANTHOCYTES BLD QL SMEAR: (no result)
AGRAN PLATELETS BLD QL SMEAR: (no result)
AGRAN PLATELETS BLD QL SMEAR: (no result)
ANION GAP SERPL CALC-SCNC: 14.4 MMOL/L (ref 4–12)
ANISOCYTOSIS BLD QL SMEAR: (no result)
ANISOCYTOSIS BLD QL SMEAR: (no result)
ANISOCYTOSIS BLD QL: (no result)
ANISOCYTOSIS BLD QL: (no result)
AUER BODIES BLD QL SMEAR: (no result)
AUER BODIES BLD QL SMEAR: (no result)
BASO STIPL BLD QL SMEAR: (no result)
BASO STIPL BLD QL SMEAR: (no result)
BASOPHILS # BLD AUTO: 0.06 X 10*3/UL (ref 0–0.1)
BASOPHILS # BLD MANUAL: (no result) K/UL (ref 0–0.2)
BASOPHILS # BLD MANUAL: (no result) K/UL (ref 0–0.2)
BASOPHILS NFR BLD AUTO: 1 %
BASOPHILS NFR SPEC MANUAL: (no result) %
BASOPHILS NFR SPEC MANUAL: (no result) %
BLASTS # BLD MANUAL: (no result) %
BLASTS # BLD MANUAL: (no result) %
BLASTS # BLD MANUAL: (no result) K/UL
BLASTS # BLD MANUAL: (no result) K/UL
BUN SERPL-SCNC: 30.1 MG/DL (ref 9–27)
BUN/CREAT SERPL: 8.6 RATIO (ref 12–20)
BURR CELLS BLD QL SMEAR: (no result)
CALCIUM SPEC-MCNC: 8.9 MG/DL (ref 8.7–10.3)
CELLS COUNTED: (no result)
CELLS COUNTED: (no result)
CHLORIDE SERPL-SCNC: 98 MMOL/L (ref 96–109)
CO2 SERPL-SCNC: 25.6 MMOL/L (ref 21.6–31.8)
CREATININE: 3.5 MG/DL (ref 0.6–1.5)
DACRYOCYTES BLD QL SMEAR: (no result)
DACRYOCYTES BLD QL SMEAR: (no result)
DOHLE BOD BLD QL SMEAR: (no result)
DOHLE BOD BLD QL SMEAR: (no result)
ELLIPTOCYTES BLD QL SMEAR: (no result)
ELLIPTOCYTES BLD QL SMEAR: (no result)
EOSINOPHIL # BLD AUTO: 0.35 X 10*3/UL (ref 0.04–0.35)
EOSINOPHIL # BLD MANUAL: (no result) K/UL (ref 0–0.7)
EOSINOPHIL # BLD MANUAL: (no result) K/UL (ref 0–0.7)
EOSINOPHIL NFR BLD AUTO: 5.7 %
EOSINOPHIL NFR BLD MANUAL: (no result) %
EOSINOPHIL NFR BLD MANUAL: (no result) %
ERYTHROCYTE [DISTWIDTH] IN BLOOD BY AUTOMATED COUNT: 2.89 X 10*6/UL (ref 4.1–5.2)
ERYTHROCYTE [DISTWIDTH] IN BLOOD: 15.9 % (ref 11.5–14.5)
GIANT PLATELETS BLD QL SMEAR: (no result)
GIANT PLATELETS BLD QL SMEAR: (no result)
GLUCOSE SERPL-MCNC: 74 MG/DL (ref 70–110)
HCT VFR BLD AUTO: 27.9 % (ref 37.2–46.3)
HELMET CELLS BLD QL SMEAR: (no result)
HELMET CELLS BLD QL SMEAR: (no result)
HGB BLD-MCNC: 8.2 G/DL (ref 12–15)
HOWELL-JOLLY BOD BLD QL SMEAR: (no result)
HOWELL-JOLLY BOD BLD QL SMEAR: (no result)
HYPERCHROMASIA: (no result)
HYPERCHROMASIA: (no result)
HYPOCHROMIA BLD QL SMEAR: (no result)
HYPOCHROMIA BLD QL SMEAR: (no result)
HYPOCHROMIA BLD QL: (no result)
HYPOCHROMIA BLD QL: (no result)
IMM GRANULOCYTES # BLD: 0.03 X 10*3/UL (ref 0–0.04)
IMM GRANULOCYTES BLD QL AUTO: 0.5 %
LG PLATELETS BLD QL SMEAR: (no result)
LG PLATELETS BLD QL SMEAR: (no result)
LYMPHOCYTES # BLD MANUAL: (no result) K/UL (ref 1–4.8)
LYMPHOCYTES # BLD MANUAL: (no result) K/UL (ref 1–4.8)
LYMPHOCYTES # SPEC AUTO: 0.97 X 10*3/UL (ref 0.9–5)
LYMPHOCYTES NFR BLD MANUAL: (no result) %
LYMPHOCYTES NFR BLD MANUAL: (no result) %
LYMPHOCYTES NFR SPEC AUTO: 15.8 %
Lab: (no result)
MACROCYTES BLD QL SMEAR: (no result)
MACROCYTES BLD QL SMEAR: (no result)
MACROCYTES BLD QL: (no result)
MACROCYTES BLD QL: (no result)
MAGNESIUM SPEC-SCNC: 2 MG/DL (ref 1.5–2.4)
MCH RBC QN AUTO: 28.4 PG (ref 27–32)
MCHC RBC AUTO-ENTMCNC: 29.4 G/DL (ref 32–37)
MCV RBC AUTO: 96.5 FL (ref 80–97)
METAMYELOCYTES # BLD: (no result) K/UL
METAMYELOCYTES # BLD: (no result) K/UL
METAMYELOCYTES NFR BLD MANUAL: (no result) %
METAMYELOCYTES NFR BLD MANUAL: (no result) %
MICROCYTES BLD QL SMEAR: (no result)
MICROCYTES BLD QL SMEAR: (no result)
MICROCYTOSIS: (no result)
MICROCYTOSIS: (no result)
MONOCYTES # BLD AUTO: 0.69 X 10*3/UL (ref 0.2–1)
MONOCYTES # BLD MANUAL: (no result) K/UL (ref 0–1)
MONOCYTES # BLD MANUAL: (no result) K/UL (ref 0–1)
MONOCYTES NFR BLD AUTO: 11.3 %
MONOCYTES NFR BLD MANUAL: (no result) %
MONOCYTES NFR BLD MANUAL: (no result) %
MYELOCYTES # BLD MANUAL: (no result) K/UL
MYELOCYTES # BLD MANUAL: (no result) K/UL
MYELOCYTES NFR BLD MANUAL: (no result) %
MYELOCYTES NFR BLD MANUAL: (no result) %
NEUTROPHILS # BLD AUTO: 4.02 X 10*3/UL (ref 1.8–7.7)
NEUTROPHILS NFR BLD AUTO: 65.7 %
NEUTROPHILS NFR BLD MANUAL: (no result) %
NEUTROPHILS NFR BLD MANUAL: (no result) %
NEUTS BAND # BLD MANUAL: (no result) K/UL
NEUTS BAND # BLD MANUAL: (no result) K/UL
NEUTS BAND NFR BLD: (no result) %
NEUTS BAND NFR BLD: (no result) %
NEUTS HYPERSEG # BLD: (no result) 10*3/UL
NEUTS HYPERSEG # BLD: (no result) 10*3/UL
NEUTS SEG # BLD MANUAL: (no result) K/UL (ref 1.3–7.7)
NEUTS SEG # BLD MANUAL: (no result) K/UL (ref 1.3–7.7)
NRBC # BLD: (no result) /100 WBC (ref 0–0)
NRBC # BLD: (no result) /100 WBC (ref 0–0)
NRBC BLD AUTO-RTO: 0 X 10*3/UL (ref 0–0.01)
OTHER CELLS NFR BLD MANUAL: (no result) %
OTHER CELLS NFR BLD MANUAL: (no result) %
OVALOCYTES BLD QL SMEAR: (no result)
OVALOCYTES BLD QL SMEAR: (no result)
PAPPENHEIMER BOD BLD QL SMEAR: (no result)
PAPPENHEIMER BOD BLD QL SMEAR: (no result)
PARASITE [PRESENCE] IN BLOOD BY LIGHT MICROSCOPY: (no result)
PARASITE [PRESENCE] IN BLOOD BY LIGHT MICROSCOPY: (no result)
PELGER HUET CELLS BLD QL SMEAR: (no result)
PELGER HUET CELLS BLD QL SMEAR: (no result)
PLASMA CELLS # BLD MANUAL: (no result) %
PLASMA CELLS # BLD MANUAL: (no result) %
PLASMA CELLS # BLD MANUAL: (no result) K/UL
PLASMA CELLS # BLD MANUAL: (no result) K/UL
PLATELET # BLD AUTO: 145 X 10*3/UL (ref 140–440)
PLATELETS.RETICULATED NFR BLD AUTO: 11.4 % (ref 1.1–6.1)
PMV BLD AUTO: 12.9 FL (ref 9.5–12.2)
POIKILOCYTOSIS BLD QL SMEAR: (no result)
POIKILOCYTOSIS: (no result)
POIKILOCYTOSIS: (no result)
POLYCHROMASIA BLD QL SMEAR: (no result)
POLYCHROMASIA BLD QL SMEAR: (no result)
POTASSIUM SERPL-SCNC: 4.9 MMOL/L (ref 3.5–5.5)
PROMYELOCYTES # BLD: (no result) K/UL
PROMYELOCYTES # BLD: (no result) K/UL
PROMYELOCYTES NFR BLD MANUAL: (no result) %
PROMYELOCYTES NFR BLD MANUAL: (no result) %
RBC AGGLUTINATION: (no result)
RBC AGGLUTINATION: (no result)
RBC MORPH BLD: (no result)
RBC MORPH BLD: (no result)
ROULEAUX BLD QL SMEAR: (no result)
ROULEAUX BLD QL SMEAR: (no result)
SCHISTOCYTES BLD QL SMEAR: (no result)
SICKLE CELLS BLD QL SMEAR: (no result)
SICKLE CELLS BLD QL SMEAR: (no result)
SMUDGE CELLS BLD QL SMEAR: (no result)
SMUDGE CELLS BLD QL SMEAR: (no result)
SODIUM SERPL-SCNC: 138 MMOL/L (ref 135–145)
SPHEROCYTES BLD QL SMEAR: (no result)
SPHEROCYTES BLD QL SMEAR: (no result)
STOMATOCYTES BLD QL SMEAR: (no result)
STOMATOCYTES BLD QL SMEAR: (no result)
TARGETS BLD QL SMEAR: (no result)
TARGETS BLD QL SMEAR: (no result)
TOXIC GRANULES BLD QL SMEAR: (no result)
TOXIC GRANULES BLD QL SMEAR: (no result)
VARIANT LYMPHS BLD QL SMEAR: (no result)
WBC # BLD AUTO: 6.12 X 10*3/UL (ref 4.5–10)
WBC NRBC COR # BLD: (no result) K/UL
WBC NRBC COR # BLD: (no result) K/UL
WBC TOXIC VACUOLES BLD QL SMEAR: (no result)
WBC TOXIC VACUOLES BLD QL SMEAR: (no result)

## 2025-06-28 RX ADMIN — IPRATROPIUM BROMIDE AND ALBUTEROL SULFATE SCH: .5; 3 SOLUTION RESPIRATORY (INHALATION) at 15:30

## 2025-06-28 RX ADMIN — BUDESONIDE AND FORMOTEROL FUMARATE DIHYDRATE SCH: 160; 4.5 AEROSOL RESPIRATORY (INHALATION) at 15:30

## 2025-06-28 RX ADMIN — HYDROCODONE BITARTRATE AND ACETAMINOPHEN PRN EACH: 5; 325 TABLET ORAL at 21:34

## 2025-06-29 LAB
ACANTHOCYTES BLD QL SMEAR: (no result)
AGRAN PLATELETS BLD QL SMEAR: (no result)
ANION GAP SERPL CALC-SCNC: 13.4 MMOL/L (ref 4–12)
ANISOCYTOSIS BLD QL SMEAR: (no result)
ANISOCYTOSIS BLD QL: (no result)
AUER BODIES BLD QL SMEAR: (no result)
BASO STIPL BLD QL SMEAR: (no result)
BASOPHILS # BLD AUTO: 0.06 X 10*3/UL (ref 0–0.1)
BASOPHILS # BLD MANUAL: (no result) K/UL (ref 0–0.2)
BASOPHILS NFR BLD AUTO: 0.9 %
BASOPHILS NFR SPEC MANUAL: (no result) %
BLASTS # BLD MANUAL: (no result) %
BLASTS # BLD MANUAL: (no result) K/UL
BUN SERPL-SCNC: 43.4 MG/DL (ref 9–27)
BUN/CREAT SERPL: 8.35 RATIO (ref 12–20)
BURR CELLS BLD QL SMEAR: (no result)
BURR CELLS BLD QL SMEAR: (no result)
CALCIUM SPEC-MCNC: 9.2 MG/DL (ref 8.7–10.3)
CELLS COUNTED: (no result)
CHLORIDE SERPL-SCNC: 99 MMOL/L (ref 96–109)
CO2 SERPL-SCNC: 25.6 MMOL/L (ref 21.6–31.8)
CREATININE: 5.2 MG/DL (ref 0.6–1.5)
DACRYOCYTES BLD QL SMEAR: (no result)
DOHLE BOD BLD QL SMEAR: (no result)
ELLIPTOCYTES BLD QL SMEAR: (no result)
EOSINOPHIL # BLD AUTO: 0.35 X 10*3/UL (ref 0.04–0.35)
EOSINOPHIL # BLD MANUAL: (no result) K/UL (ref 0–0.7)
EOSINOPHIL NFR BLD AUTO: 5.1 %
EOSINOPHIL NFR BLD MANUAL: (no result) %
ERYTHROCYTE [DISTWIDTH] IN BLOOD BY AUTOMATED COUNT: 2.83 X 10*6/UL (ref 4.1–5.2)
ERYTHROCYTE [DISTWIDTH] IN BLOOD: 16.2 % (ref 11.5–14.5)
GIANT PLATELETS BLD QL SMEAR: (no result)
GLUCOSE BLD-MCNC: 133 MG/DL (ref 70–110)
GLUCOSE BLD-MCNC: 207 MG/DL (ref 70–110)
GLUCOSE SERPL-MCNC: 109 MG/DL (ref 70–110)
HCT VFR BLD AUTO: 27.5 % (ref 37.2–46.3)
HELMET CELLS BLD QL SMEAR: (no result)
HGB BLD-MCNC: 8.2 G/DL (ref 12–15)
HOWELL-JOLLY BOD BLD QL SMEAR: (no result)
HYPERCHROMASIA: (no result)
HYPOCHROMIA BLD QL SMEAR: (no result)
HYPOCHROMIA BLD QL: (no result)
IMM GRANULOCYTES # BLD: 0.03 X 10*3/UL (ref 0–0.04)
IMM GRANULOCYTES BLD QL AUTO: 0.4 %
LG PLATELETS BLD QL SMEAR: (no result)
LYMPHOCYTES # BLD MANUAL: (no result) K/UL (ref 1–4.8)
LYMPHOCYTES # SPEC AUTO: 1.29 X 10*3/UL (ref 0.9–5)
LYMPHOCYTES NFR BLD MANUAL: (no result) %
LYMPHOCYTES NFR SPEC AUTO: 18.9 %
Lab: (no result)
Lab: (no result)
MACROCYTES BLD QL SMEAR: (no result)
MACROCYTES BLD QL: (no result)
MCH RBC QN AUTO: 29 PG (ref 27–32)
MCHC RBC AUTO-ENTMCNC: 29.8 G/DL (ref 32–37)
MCV RBC AUTO: 97.2 FL (ref 80–97)
METAMYELOCYTES # BLD: (no result) K/UL
METAMYELOCYTES NFR BLD MANUAL: (no result) %
MICROCYTES BLD QL SMEAR: (no result)
MICROCYTOSIS: (no result)
MONOCYTES # BLD AUTO: 0.85 X 10*3/UL (ref 0.2–1)
MONOCYTES # BLD MANUAL: (no result) K/UL (ref 0–1)
MONOCYTES NFR BLD AUTO: 12.4 %
MONOCYTES NFR BLD MANUAL: (no result) %
MYELOCYTES # BLD MANUAL: (no result) K/UL
MYELOCYTES NFR BLD MANUAL: (no result) %
NEUTROPHILS # BLD AUTO: 4.25 X 10*3/UL (ref 1.8–7.7)
NEUTROPHILS NFR BLD AUTO: 62.3 %
NEUTROPHILS NFR BLD MANUAL: (no result) %
NEUTS BAND # BLD MANUAL: (no result) K/UL
NEUTS BAND NFR BLD: (no result) %
NEUTS HYPERSEG # BLD: (no result) 10*3/UL
NEUTS SEG # BLD MANUAL: (no result) K/UL (ref 1.3–7.7)
NRBC # BLD: (no result) /100 WBC (ref 0–0)
NRBC BLD AUTO-RTO: 0 X 10*3/UL (ref 0–0.01)
OTHER CELLS NFR BLD MANUAL: (no result) %
OVALOCYTES BLD QL SMEAR: (no result)
PAPPENHEIMER BOD BLD QL SMEAR: (no result)
PARASITE [PRESENCE] IN BLOOD BY LIGHT MICROSCOPY: (no result)
PELGER HUET CELLS BLD QL SMEAR: (no result)
PLASMA CELLS # BLD MANUAL: (no result) %
PLASMA CELLS # BLD MANUAL: (no result) K/UL
PLATELET # BLD AUTO: 149 X 10*3/UL (ref 140–440)
PLATELETS.RETICULATED NFR BLD AUTO: (no result) %
PLATELETS.RETICULATED NFR BLD AUTO: 11.1 % (ref 1.1–6.1)
POIKILOCYTOSIS BLD QL SMEAR: (no result)
POIKILOCYTOSIS BLD QL SMEAR: (no result)
POIKILOCYTOSIS: (no result)
POLYCHROMASIA BLD QL SMEAR: (no result)
POTASSIUM SERPL-SCNC: 5 MMOL/L (ref 3.5–5.5)
PROMYELOCYTES # BLD: (no result) K/UL
PROMYELOCYTES NFR BLD MANUAL: (no result) %
RBC AGGLUTINATION: (no result)
RBC MORPH BLD: (no result)
ROULEAUX BLD QL SMEAR: (no result)
SCHISTOCYTES BLD QL SMEAR: (no result)
SCHISTOCYTES BLD QL SMEAR: (no result)
SICKLE CELLS BLD QL SMEAR: (no result)
SMUDGE CELLS BLD QL SMEAR: (no result)
SODIUM SERPL-SCNC: 138 MMOL/L (ref 135–145)
SPHEROCYTES BLD QL SMEAR: (no result)
STOMATOCYTES BLD QL SMEAR: (no result)
TARGETS BLD QL SMEAR: (no result)
TOXIC GRANULES BLD QL SMEAR: (no result)
VARIANT LYMPHS BLD QL SMEAR: (no result)
VARIANT LYMPHS BLD QL SMEAR: (no result)
WBC # BLD AUTO: 6.83 X 10*3/UL (ref 4.5–10)
WBC NRBC COR # BLD: (no result) K/UL
WBC TOXIC VACUOLES BLD QL SMEAR: (no result)

## 2025-06-29 RX ADMIN — FUROSEMIDE STA MG: 10 INJECTION, SOLUTION INTRAMUSCULAR; INTRAVENOUS at 13:39

## 2025-06-29 RX ADMIN — METHYLPREDNISOLONE SODIUM SUCCINATE SCH MG: 125 INJECTION, POWDER, FOR SOLUTION INTRAMUSCULAR; INTRAVENOUS at 13:40

## 2025-06-29 RX ADMIN — Medication SCH MG: at 08:00

## 2025-06-29 RX ADMIN — CEFTRIAXONE SCH MLS/HR: 1 INJECTION, POWDER, FOR SOLUTION INTRAMUSCULAR; INTRAVENOUS at 13:38

## 2025-06-30 LAB
BASOPHILS # BLD AUTO: 0.01 X 10*3/UL (ref 0–0.1)
BASOPHILS NFR BLD AUTO: 0.2 %
EOSINOPHIL # BLD AUTO: 0 X 10*3/UL (ref 0.04–0.35)
EOSINOPHIL NFR BLD AUTO: 0 %
ERYTHROCYTE [DISTWIDTH] IN BLOOD BY AUTOMATED COUNT: 2.8 X 10*6/UL (ref 4.1–5.2)
ERYTHROCYTE [DISTWIDTH] IN BLOOD: 16 % (ref 11.5–14.5)
GLUCOSE BLD-MCNC: 138 MG/DL (ref 70–110)
GLUCOSE BLD-MCNC: 155 MG/DL (ref 70–110)
HCT VFR BLD AUTO: 26.7 % (ref 37.2–46.3)
HGB BLD-MCNC: 8 G/DL (ref 12–15)
IMM GRANULOCYTES # BLD: 0.1 X 10*3/UL (ref 0–0.04)
IMM GRANULOCYTES BLD QL AUTO: 2.1 %
LYMPHOCYTES # SPEC AUTO: 0.72 X 10*3/UL (ref 0.9–5)
LYMPHOCYTES NFR SPEC AUTO: 15.4 %
MCH RBC QN AUTO: 28.6 PG (ref 27–32)
MCHC RBC AUTO-ENTMCNC: 30 G/DL (ref 32–37)
MCV RBC AUTO: 95.4 FL (ref 80–97)
MONOCYTES # BLD AUTO: 0.07 X 10*3/UL (ref 0.2–1)
MONOCYTES NFR BLD AUTO: 1.5 %
NEUTROPHILS # BLD AUTO: 3.79 X 10*3/UL (ref 1.8–7.7)
NEUTROPHILS NFR BLD AUTO: 80.8 %
NRBC BLD AUTO-RTO: 0 X 10*3/UL (ref 0–0.01)
PLATELET # BLD AUTO: 92 X 10*3/UL (ref 140–440)
PMV BLD AUTO: 13.2 FL (ref 9.5–12.2)
WBC # BLD AUTO: 4.69 X 10*3/UL (ref 4.5–10)

## 2025-07-01 VITALS — HEART RATE: 72 BPM

## 2025-07-01 VITALS — RESPIRATION RATE: 16 BRPM

## 2025-07-01 VITALS — SYSTOLIC BLOOD PRESSURE: 122 MMHG | DIASTOLIC BLOOD PRESSURE: 75 MMHG | TEMPERATURE: 98.1 F

## 2025-07-01 LAB
ANION GAP SERPL CALC-SCNC: 17.8 MMOL/L (ref 4–12)
BUN SERPL-SCNC: 73.6 MG/DL (ref 9–27)
BUN/CREAT SERPL: 15.33 RATIO (ref 12–20)
CALCIUM SPEC-MCNC: 9.4 MG/DL (ref 8.7–10.3)
CHLORIDE SERPL-SCNC: 92 MMOL/L (ref 96–109)
CO2 SERPL-SCNC: 25.2 MMOL/L (ref 21.6–31.8)
CREATININE: 4.8 MG/DL (ref 0.6–1.5)
GLUCOSE BLD-MCNC: 137 MG/DL (ref 70–110)
GLUCOSE BLD-MCNC: 155 MG/DL (ref 70–110)
GLUCOSE SERPL-MCNC: 140 MG/DL (ref 70–110)
POTASSIUM SERPL-SCNC: 4.5 MMOL/L (ref 3.5–5.5)
SODIUM SERPL-SCNC: 135 MMOL/L (ref 135–145)